# Patient Record
Sex: MALE | Race: WHITE | Employment: FULL TIME | URBAN - METROPOLITAN AREA
[De-identification: names, ages, dates, MRNs, and addresses within clinical notes are randomized per-mention and may not be internally consistent; named-entity substitution may affect disease eponyms.]

---

## 2017-02-16 RX ORDER — LOSARTAN POTASSIUM AND HYDROCHLOROTHIAZIDE 12.5; 1 MG/1; MG/1
TABLET ORAL
Qty: 30 TABLET | Refills: 0 | Status: SHIPPED | OUTPATIENT
Start: 2017-02-16 | End: 2017-03-14

## 2017-02-17 ENCOUNTER — OFFICE VISIT (OUTPATIENT)
Dept: INTERNAL MEDICINE CLINIC | Facility: CLINIC | Age: 72
End: 2017-02-17

## 2017-02-17 VITALS
BODY MASS INDEX: 29.89 KG/M2 | RESPIRATION RATE: 16 BRPM | HEIGHT: 66 IN | OXYGEN SATURATION: 98 % | SYSTOLIC BLOOD PRESSURE: 120 MMHG | DIASTOLIC BLOOD PRESSURE: 70 MMHG | HEART RATE: 77 BPM | WEIGHT: 186 LBS

## 2017-02-17 DIAGNOSIS — E78.00 PURE HYPERCHOLESTEROLEMIA: Chronic | ICD-10-CM

## 2017-02-17 DIAGNOSIS — I10 ESSENTIAL HYPERTENSION, BENIGN: Primary | Chronic | ICD-10-CM

## 2017-02-17 DIAGNOSIS — R97.20 ELEVATED PSA: ICD-10-CM

## 2017-02-17 DIAGNOSIS — R73.9 HYPERGLYCEMIA: ICD-10-CM

## 2017-02-17 PROCEDURE — 99213 OFFICE O/P EST LOW 20 MIN: CPT | Performed by: INTERNAL MEDICINE

## 2017-02-17 NOTE — PROGRESS NOTES
Jayashree García DOB 1945 is a 80year old male.     Patient presents with:  Medication Follow-Up       HPI:   BP check    Current Outpatient Prescriptions:  LOSARTAN POTASSIUM-HCTZ 100-12.5 MG Oral Tab TAKE ONE TABLET BY MOUTH ONCE DAILY Disp: 30 tabl Pharynx: normal.   Sinuses: non-tender. HEART:   Clicks: no.   Distal Pulses Palpable: yes. Edema: trace   Gallop: no .   Heart sounds: normal S1S2. Murmurs: none. Rhythm: regular. LUNGS:   Airflow: normal air movement.    Auscultation: no wheez

## 2017-02-21 LAB
ALBUMIN/GLOBULIN RATIO: 1.6 (CALC) (ref 1–2.5)
ALBUMIN: 4.6 G/DL (ref 3.6–5.1)
ALKALINE PHOSPHATASE: 88 U/L (ref 40–115)
ALT: 30 U/L (ref 9–46)
AST: 19 U/L (ref 10–35)
BILIRUBIN, TOTAL: 0.8 MG/DL (ref 0.2–1.2)
BUN: 21 MG/DL (ref 7–25)
CALCIUM: 9.7 MG/DL (ref 8.6–10.3)
CARBON DIOXIDE: 31 MMOL/L (ref 20–31)
CHLORIDE: 104 MMOL/L (ref 98–110)
CHOL/HDLC RATIO: 3.6 (CALC)
CHOLESTEROL, TOTAL: 209 MG/DL (ref 125–200)
CREATININE: 1.09 MG/DL (ref 0.7–1.18)
EGFR IF AFRICN AM: 79 ML/MIN/1.73M2
EGFR IF NONAFRICN AM: 68 ML/MIN/1.73M2
GLOBULIN: 2.8 G/DL (CALC) (ref 1.9–3.7)
GLUCOSE: 90 MG/DL (ref 65–99)
HDL CHOLESTEROL: 58 MG/DL
HEMOGLOBIN A1C: 5.6 % OF TOTAL HGB
LDL-CHOLESTEROL: 137 MG/DL (CALC)
NON-HDL CHOLESTEROL: 151 MG/DL (CALC)
POTASSIUM: 4.7 MMOL/L (ref 3.5–5.3)
PROTEIN, TOTAL: 7.4 G/DL (ref 6.1–8.1)
PSA, TOTAL: 3.7 NG/ML
SODIUM: 143 MMOL/L (ref 135–146)
TRIGLYCERIDES: 72 MG/DL

## 2017-02-27 DIAGNOSIS — E78.00 PURE HYPERCHOLESTEROLEMIA: Primary | Chronic | ICD-10-CM

## 2017-02-27 RX ORDER — PRAVASTATIN SODIUM 20 MG
20 TABLET ORAL NIGHTLY
Qty: 30 TABLET | Refills: 1 | Status: SHIPPED | OUTPATIENT
Start: 2017-02-27 | End: 2017-05-05

## 2017-03-14 RX ORDER — AMLODIPINE BESYLATE 5 MG/1
TABLET ORAL
Qty: 30 TABLET | Refills: 2 | Status: SHIPPED | OUTPATIENT
Start: 2017-03-14 | End: 2017-06-02

## 2017-03-14 RX ORDER — LOSARTAN POTASSIUM AND HYDROCHLOROTHIAZIDE 12.5; 1 MG/1; MG/1
TABLET ORAL
Qty: 30 TABLET | Refills: 2 | Status: SHIPPED | OUTPATIENT
Start: 2017-03-14 | End: 2017-06-02

## 2017-03-14 RX ORDER — ASPIRIN 81 MG/1
TABLET ORAL
Qty: 90 TABLET | Refills: 0 | Status: ON HOLD | OUTPATIENT
Start: 2017-03-14 | End: 2018-04-26

## 2017-03-14 NOTE — TELEPHONE ENCOUNTER
ASA is not part of protocol list. Please approve if appropriate. Last OV 2/17/17. Last refill 5/13/16 #90 with 3 refills.

## 2017-05-05 RX ORDER — PRAVASTATIN SODIUM 20 MG
TABLET ORAL
Qty: 30 TABLET | Refills: 0 | Status: SHIPPED | OUTPATIENT
Start: 2017-05-05 | End: 2017-06-02

## 2017-06-02 RX ORDER — PRAVASTATIN SODIUM 20 MG
TABLET ORAL
Qty: 30 TABLET | Refills: 0 | Status: SHIPPED | OUTPATIENT
Start: 2017-06-02 | End: 2017-07-06

## 2017-06-02 RX ORDER — AMLODIPINE BESYLATE 5 MG/1
TABLET ORAL
Qty: 30 TABLET | Refills: 0 | Status: SHIPPED | OUTPATIENT
Start: 2017-06-02 | End: 2017-07-25

## 2017-06-02 RX ORDER — LOSARTAN POTASSIUM AND HYDROCHLOROTHIAZIDE 12.5; 1 MG/1; MG/1
TABLET ORAL
Qty: 30 TABLET | Refills: 0 | Status: SHIPPED | OUTPATIENT
Start: 2017-06-02 | End: 2017-07-20

## 2017-07-06 RX ORDER — PRAVASTATIN SODIUM 20 MG
TABLET ORAL
Qty: 30 TABLET | Refills: 0 | Status: SHIPPED | OUTPATIENT
Start: 2017-07-06 | End: 2017-08-08

## 2017-07-20 RX ORDER — LOSARTAN POTASSIUM AND HYDROCHLOROTHIAZIDE 12.5; 1 MG/1; MG/1
TABLET ORAL
Qty: 30 TABLET | Refills: 0 | Status: SHIPPED | OUTPATIENT
Start: 2017-07-20 | End: 2017-08-08

## 2017-07-25 RX ORDER — AMLODIPINE BESYLATE 5 MG/1
TABLET ORAL
Qty: 90 TABLET | Refills: 0 | Status: SHIPPED | OUTPATIENT
Start: 2017-07-25 | End: 2017-08-08

## 2017-08-08 RX ORDER — LOSARTAN POTASSIUM AND HYDROCHLOROTHIAZIDE 12.5; 1 MG/1; MG/1
TABLET ORAL
Qty: 90 TABLET | Refills: 0 | Status: SHIPPED | OUTPATIENT
Start: 2017-08-08 | End: 2017-11-07

## 2017-08-08 RX ORDER — PRAVASTATIN SODIUM 20 MG
TABLET ORAL
Qty: 90 TABLET | Refills: 0 | Status: SHIPPED | OUTPATIENT
Start: 2017-08-08 | End: 2017-11-07

## 2017-08-08 RX ORDER — AMLODIPINE BESYLATE 5 MG/1
TABLET ORAL
Qty: 90 TABLET | Refills: 0 | Status: SHIPPED | OUTPATIENT
Start: 2017-08-08 | End: 2017-11-07

## 2017-10-02 ENCOUNTER — OFFICE VISIT (OUTPATIENT)
Dept: INTERNAL MEDICINE CLINIC | Facility: CLINIC | Age: 72
End: 2017-10-02

## 2017-10-02 VITALS
HEIGHT: 66 IN | RESPIRATION RATE: 16 BRPM | HEART RATE: 74 BPM | BODY MASS INDEX: 29.81 KG/M2 | WEIGHT: 185.5 LBS | DIASTOLIC BLOOD PRESSURE: 70 MMHG | SYSTOLIC BLOOD PRESSURE: 120 MMHG | OXYGEN SATURATION: 98 % | TEMPERATURE: 99 F

## 2017-10-02 DIAGNOSIS — Z00.00 ROUTINE GENERAL MEDICAL EXAMINATION AT A HEALTH CARE FACILITY: Primary | ICD-10-CM

## 2017-10-02 DIAGNOSIS — I10 ESSENTIAL HYPERTENSION, BENIGN: Chronic | ICD-10-CM

## 2017-10-02 DIAGNOSIS — L30.9 DERMATITIS: ICD-10-CM

## 2017-10-02 DIAGNOSIS — E78.00 PURE HYPERCHOLESTEROLEMIA: Chronic | ICD-10-CM

## 2017-10-02 PROCEDURE — 90686 IIV4 VACC NO PRSV 0.5 ML IM: CPT | Performed by: INTERNAL MEDICINE

## 2017-10-02 PROCEDURE — G0009 ADMIN PNEUMOCOCCAL VACCINE: HCPCS | Performed by: INTERNAL MEDICINE

## 2017-10-02 PROCEDURE — 90670 PCV13 VACCINE IM: CPT | Performed by: INTERNAL MEDICINE

## 2017-10-02 PROCEDURE — G0439 PPPS, SUBSEQ VISIT: HCPCS | Performed by: INTERNAL MEDICINE

## 2017-10-02 PROCEDURE — G0008 ADMIN INFLUENZA VIRUS VAC: HCPCS | Performed by: INTERNAL MEDICINE

## 2017-10-02 NOTE — PROGRESS NOTES
Carina Harry  1945 is a 71year old male. Patient presents with:  Physical: Est Pt.  Medicare wellness visit      HPI:   See below      Current Outpatient Prescriptions:  LOSARTAN POTASSIUM-HCTZ 100-12.5 MG Oral Tab TAKE ONE TABLET BY MOUTH ONC no sinus trouble. Mouth and Pharynx no sore throats, no hoarseness. Neck no lumps, no goiter, no neck stiffness or pain. Endocrine:   Diabetes none. Thyroid disorder none.    Respiratory:   Patient denies chest pain, cough, PERDOMO (dyspnea on exertion),wheez Conjunctiva normal.  Head: normocephalic. Nasal septum: midline. Nose: normal pink mucosa, no congestion, no swelling, no bleeding. Oral cavity: normal, no lesions seen. Turbinates: normal.   NECK:   Carotid bruit: none.    Cervical lymph nodes: unr physical.    Diagnoses and all orders for this visit:    Routine general medical examination at a health care facility  -     ASSAY, THYROID STIM HORMONE  -     T4(THYROXINE TOTAL)  -     HEMOGLOBIN A1C  -     COMP METABOLIC PANEL (14)  -     CBC WITH DIFF

## 2017-10-05 ENCOUNTER — TELEPHONE (OUTPATIENT)
Dept: INTERNAL MEDICINE CLINIC | Facility: CLINIC | Age: 72
End: 2017-10-05

## 2017-10-05 DIAGNOSIS — R73.03 PREDIABETES: Chronic | ICD-10-CM

## 2017-10-05 DIAGNOSIS — Z12.5 ENCOUNTER FOR SCREENING FOR MALIGNANT NEOPLASM OF PROSTATE: ICD-10-CM

## 2017-10-05 DIAGNOSIS — Z00.00 BLOOD TESTS FOR ROUTINE GENERAL PHYSICAL EXAMINATION: Primary | ICD-10-CM

## 2017-10-05 DIAGNOSIS — E78.00 PURE HYPERCHOLESTEROLEMIA: Chronic | ICD-10-CM

## 2017-10-05 NOTE — TELEPHONE ENCOUNTER
Pt would like lab orders sent [facility] please change in chart; he will go fri 20-6-17 in am do get done

## 2017-10-06 ENCOUNTER — LAB ENCOUNTER (OUTPATIENT)
Dept: LAB | Age: 72
End: 2017-10-06
Attending: INTERNAL MEDICINE
Payer: MEDICARE

## 2017-10-06 DIAGNOSIS — Z12.5 ENCOUNTER FOR SCREENING FOR MALIGNANT NEOPLASM OF PROSTATE: ICD-10-CM

## 2017-10-06 DIAGNOSIS — E78.00 PURE HYPERCHOLESTEROLEMIA: Chronic | ICD-10-CM

## 2017-10-06 DIAGNOSIS — Z00.00 BLOOD TESTS FOR ROUTINE GENERAL PHYSICAL EXAMINATION: ICD-10-CM

## 2017-10-06 DIAGNOSIS — R73.03 PREDIABETES: ICD-10-CM

## 2017-10-06 PROCEDURE — 85025 COMPLETE CBC W/AUTO DIFF WBC: CPT

## 2017-10-06 PROCEDURE — 80053 COMPREHEN METABOLIC PANEL: CPT

## 2017-10-06 PROCEDURE — 80061 LIPID PANEL: CPT

## 2017-10-06 PROCEDURE — 83036 HEMOGLOBIN GLYCOSYLATED A1C: CPT

## 2017-10-06 PROCEDURE — 36415 COLL VENOUS BLD VENIPUNCTURE: CPT

## 2017-10-06 PROCEDURE — 84443 ASSAY THYROID STIM HORMONE: CPT

## 2017-10-06 PROCEDURE — 84436 ASSAY OF TOTAL THYROXINE: CPT

## 2017-10-06 PROCEDURE — 81003 URINALYSIS AUTO W/O SCOPE: CPT

## 2017-10-13 ENCOUNTER — OFFICE VISIT (OUTPATIENT)
Dept: INTERNAL MEDICINE CLINIC | Facility: CLINIC | Age: 72
End: 2017-10-13

## 2017-10-13 VITALS
DIASTOLIC BLOOD PRESSURE: 70 MMHG | HEART RATE: 66 BPM | WEIGHT: 184 LBS | RESPIRATION RATE: 16 BRPM | OXYGEN SATURATION: 98 % | BODY MASS INDEX: 30 KG/M2 | TEMPERATURE: 98 F | SYSTOLIC BLOOD PRESSURE: 126 MMHG

## 2017-10-13 DIAGNOSIS — R97.20 ELEVATED PSA: ICD-10-CM

## 2017-10-13 DIAGNOSIS — I10 ESSENTIAL HYPERTENSION, BENIGN: Primary | Chronic | ICD-10-CM

## 2017-10-13 PROCEDURE — 99213 OFFICE O/P EST LOW 20 MIN: CPT | Performed by: INTERNAL MEDICINE

## 2017-10-13 NOTE — PROGRESS NOTES
Corrinne [facility] 1945 is a 71year old male. Patient presents with:   Follow - Up: labs       HPI:   BP check and lab results    Current Outpatient Prescriptions:  LOSARTAN POTASSIUM-HCTZ 100-12.5 MG Oral Tab TAKE ONE TABLET BY MOUTH ONCE DAILY SpO2 98%   BMI 29.70 kg/m²   HEENT:   jvp not raised. Ear canals: normal.   Ear drums: normal .   Ears: unremarkable. Mouth: unremarkable. Nasal septum: midline. Pharynx: normal.   Sinuses: non-tender.    HEART:   Clicks: no.   Distal Pulses Palpabl

## 2017-11-09 RX ORDER — AMLODIPINE BESYLATE 5 MG/1
TABLET ORAL
Qty: 90 TABLET | Refills: 0 | Status: SHIPPED | OUTPATIENT
Start: 2017-11-09 | End: 2018-05-07

## 2017-11-09 RX ORDER — LOSARTAN POTASSIUM AND HYDROCHLOROTHIAZIDE 12.5; 1 MG/1; MG/1
TABLET ORAL
Qty: 90 TABLET | Refills: 0 | Status: SHIPPED | OUTPATIENT
Start: 2017-11-09 | End: 2018-02-14

## 2017-11-09 RX ORDER — PRAVASTATIN SODIUM 20 MG
TABLET ORAL
Qty: 90 TABLET | Refills: 0 | Status: SHIPPED | OUTPATIENT
Start: 2017-11-09 | End: 2018-02-14

## 2017-11-09 NOTE — TELEPHONE ENCOUNTER
Requesting Amlodipine, Pravastatin, Losartan  LOV: 10/13/17  RTC: 6 months  Last Relevant Labs: 10-6-017  Filled:  LOSARTAN POTASSIUM-HCTZ 100-12.5 MG Oral Tab 90 tablet 0 8/8/2017     AMLODIPINE BESYLATE 5 MG Oral Tab 90 tablet 0 8/8/2017     PRAVASTATIN

## 2018-02-14 RX ORDER — LOSARTAN POTASSIUM AND HYDROCHLOROTHIAZIDE 12.5; 1 MG/1; MG/1
TABLET ORAL
Qty: 90 TABLET | Refills: 1 | Status: SHIPPED | OUTPATIENT
Start: 2018-02-14 | End: 2018-08-16

## 2018-02-14 RX ORDER — PRAVASTATIN SODIUM 20 MG
TABLET ORAL
Qty: 90 TABLET | Refills: 1 | Status: SHIPPED | OUTPATIENT
Start: 2018-02-14 | End: 2018-10-24

## 2018-02-14 NOTE — TELEPHONE ENCOUNTER
Pharmacy calling in requesting a refill for:     LOSARTAN POTASSIUM-HCTZ 100-12.5 MG Oral Tab  PRAVASTATIN SODIUM 20 MG Oral Tab    To be sent to:  [address] ( on file)

## 2018-04-23 ENCOUNTER — APPOINTMENT (OUTPATIENT)
Dept: CT IMAGING | Facility: HOSPITAL | Age: 73
End: 2018-04-23
Attending: EMERGENCY MEDICINE
Payer: MEDICARE

## 2018-04-23 ENCOUNTER — APPOINTMENT (OUTPATIENT)
Dept: GENERAL RADIOLOGY | Facility: HOSPITAL | Age: 73
End: 2018-04-23
Attending: EMERGENCY MEDICINE
Payer: MEDICARE

## 2018-04-23 ENCOUNTER — OFFICE VISIT (OUTPATIENT)
Dept: INTERNAL MEDICINE CLINIC | Facility: CLINIC | Age: 73
End: 2018-04-23

## 2018-04-23 ENCOUNTER — HOSPITAL ENCOUNTER (OUTPATIENT)
Facility: HOSPITAL | Age: 73
Setting detail: OBSERVATION
LOS: 1 days | Discharge: HOME | End: 2018-04-26
Attending: EMERGENCY MEDICINE | Admitting: HOSPITALIST
Payer: MEDICARE

## 2018-04-23 VITALS
RESPIRATION RATE: 16 BRPM | TEMPERATURE: 100 F | SYSTOLIC BLOOD PRESSURE: 144 MMHG | BODY MASS INDEX: 29.32 KG/M2 | WEIGHT: 176 LBS | DIASTOLIC BLOOD PRESSURE: 68 MMHG | HEART RATE: 90 BPM | HEIGHT: 65 IN | OXYGEN SATURATION: 98 %

## 2018-04-23 DIAGNOSIS — R50.9 FEBRILE ILLNESS: ICD-10-CM

## 2018-04-23 DIAGNOSIS — D64.9 ANEMIA, UNSPECIFIED TYPE: ICD-10-CM

## 2018-04-23 DIAGNOSIS — K92.1 GASTROINTESTINAL HEMORRHAGE WITH MELENA: Primary | ICD-10-CM

## 2018-04-23 DIAGNOSIS — K92.2 UPPER GI BLEEDING: Primary | ICD-10-CM

## 2018-04-23 DIAGNOSIS — K92.1 MELENA: ICD-10-CM

## 2018-04-23 PROCEDURE — 71045 X-RAY EXAM CHEST 1 VIEW: CPT | Performed by: EMERGENCY MEDICINE

## 2018-04-23 PROCEDURE — 99214 OFFICE O/P EST MOD 30 MIN: CPT | Performed by: INTERNAL MEDICINE

## 2018-04-23 PROCEDURE — 74177 CT ABD & PELVIS W/CONTRAST: CPT | Performed by: EMERGENCY MEDICINE

## 2018-04-23 RX ORDER — ACETAMINOPHEN 500 MG
1000 TABLET ORAL ONCE
Status: COMPLETED | OUTPATIENT
Start: 2018-04-23 | End: 2018-04-23

## 2018-04-23 RX ORDER — SODIUM CHLORIDE 9 MG/ML
INJECTION, SOLUTION INTRAVENOUS CONTINUOUS
Status: DISCONTINUED | OUTPATIENT
Start: 2018-04-24 | End: 2018-04-26

## 2018-04-23 RX ORDER — ONDANSETRON 2 MG/ML
4 INJECTION INTRAMUSCULAR; INTRAVENOUS EVERY 6 HOURS PRN
Status: DISCONTINUED | OUTPATIENT
Start: 2018-04-23 | End: 2018-04-23

## 2018-04-23 RX ORDER — ONDANSETRON 2 MG/ML
4 INJECTION INTRAMUSCULAR; INTRAVENOUS EVERY 6 HOURS PRN
Status: DISCONTINUED | OUTPATIENT
Start: 2018-04-23 | End: 2018-04-26

## 2018-04-23 RX ORDER — ACETAMINOPHEN 325 MG/1
650 TABLET ORAL EVERY 6 HOURS PRN
Status: DISCONTINUED | OUTPATIENT
Start: 2018-04-23 | End: 2018-04-26

## 2018-04-23 RX ORDER — ONDANSETRON 2 MG/ML
4 INJECTION INTRAMUSCULAR; INTRAVENOUS
Status: DISCONTINUED | OUTPATIENT
Start: 2018-04-23 | End: 2018-04-26

## 2018-04-24 ENCOUNTER — ANESTHESIA EVENT (OUTPATIENT)
Dept: ENDOSCOPY | Facility: HOSPITAL | Age: 73
End: 2018-04-24
Payer: MEDICARE

## 2018-04-24 PROCEDURE — 99219 INITIAL OBSERVATION CARE,LEVL II: CPT | Performed by: INTERNAL MEDICINE

## 2018-04-24 RX ORDER — POTASSIUM CHLORIDE 20 MEQ/1
40 TABLET, EXTENDED RELEASE ORAL EVERY 4 HOURS
Status: COMPLETED | OUTPATIENT
Start: 2018-04-24 | End: 2018-04-24

## 2018-04-24 NOTE — PROGRESS NOTES
Pt seen and examined. Cont. IV PPI BID. Cont. IVF. Monitor Hgb. EGD per GI. Hold BP meds.     Alysha Marquez MD

## 2018-04-24 NOTE — PLAN OF CARE
Patient alert and oriented x4. Patient denies pain this morning. Patient denies nausea/vomiting. Patient states he has not had any stools since yesterday morning. If patient has another stool then sample will be sent for c-diff.  Patient to be evaluated by

## 2018-04-24 NOTE — PROGRESS NOTES
NURSING ADMISSION NOTE      Patient admitted via Cart from ER due to GI bleed,  Oriented to room. Safety precautions initiated. Bed in low position. Call light in reach. NPO except meds instructed, alert and oriented.   Denies pain, GI consulted in

## 2018-04-24 NOTE — H&P
[facility]  History and Physical     Jayashree Laughter Patient Status:  Inpatient    1945 MRN [id number]   [facility] 4NW-A Attending Augie Farooq MD   Hosp Day # 1 PCP Leny Garcia MD     Chief Complaint: melena    History [Penicillins]       Hives    Medications:    No current facility-administered medications on file prior to encounter.    Current Outpatient Prescriptions on File Prior to Encounter:  Losartan Potassium-HCTZ 100-12.5 MG Oral Tab TAKE ONE TABLET BY MOUTH ONCE GFRAA  81   GFRNAA  70   CA  9.3   ALB  3.9   NA  138   K  3.2*   CL  104   CO2  28.0   ALKPHO  85   AST  21   ALT  36   BILT  0.4   TP  7.3       No results for input(s): PTP, INR in the last 72 hours.     Recent Labs   Lab  04/23/18 2048   TROP  <0.04

## 2018-04-24 NOTE — ANESTHESIA PREPROCEDURE EVALUATION
PRE-OP EVALUATION    Patient Name: Fabien Alford    Pre-op Diagnosis: Melena [K92.1]  Anemia, unspecified type [D64.9]    Procedure(s):  ESOPHAGOGASTRODUODENOSCOPY (EGD)    Surgeon(s) and Role:     * Anthony Kaur, DO - Primary    Pre-op vitals revie Negative GI/hepatic/renal ROS.                              Cardiovascular    Negative cardiovascular ROS.              (+) hypertension   (+) hyperlipidemia                                  Endo/Other                           (+) arthritis       Pulmonary

## 2018-04-24 NOTE — CONSULTS
[facility]                       Gastroenterology Consultation-[facility]    Riley Moreno Patient Status:  Inpatient    1945 MRN [id number]   [facility] 4NW-A Attending Patrecia Cooks, MD   [address] Day # 1 PCP Veronica hyperplasia)    • DDD (degenerative disc disease), cervical    • DJD (degenerative joint disease), multiple sites     hips, lumbar, cervical   • Essential hypertension    • High blood pressure    • High cholesterol    • Hypertension    • Other and unspecif breath, asthma, copd, recurrent pneumonia            Hematologic: The patient reports no easy bruising, frequent gum bleeding or nose bleeding;   The patient has no history of known chronic anemia            Dermatologic: The patient reports no recent rashe ALB 3.9 04/23/2018   ALKPHO 85 04/23/2018   BILT 0.4 04/23/2018   AST 21 04/23/2018   ALT 36 04/23/2018     Recent Labs   Lab  04/23/18 2048   GLU  107*   BUN  23*   CREATSERUM  1.06   GFRAA  81   GFRNAA  70   CA  9.3   NA  138   K  3.2*   CL  104   CO  Moderate to severe colonic diverticulosis.  No acute inflammation.  Normal appendix.  No ascites. ABDOMINAL WALL:  Fact containing umbilical hernia measuring up to 4.2 cm.  Defect measured at 1.5 cm. URINARY BLADDER:  Normal distended shape.   PELVIC NOD episodes of bleeding or change in BM pattern. On a daily baby ASA and was using ibuprofen daily x 3-4 weeks for arthritic pain. No recent EGD and he has never completed a colonoscopy.  On admission, BUN elevated 23 with normal creatinine and Hgb now 9.5 fro

## 2018-04-24 NOTE — CM/SW NOTE
Patient failed Inpatient criteria. Second level of review completed and supports Observation. UR committee in agreement. Discussed with Dr Cheyenne Watson   approves.

## 2018-04-24 NOTE — ED PROVIDER NOTES
Patient Seen in: [facility] Emergency Department    History   Patient presents with:  GI Bleeding (gastrointestinal)  Abdomen/Flank Pain (GI/)    Stated Complaint: black stool    HPI     18-year-old male presents for evaluation of black diarrhea. Temp 101.5 °F (38.6 °C) (Temporal)   Resp 18   Ht 165.1 cm (5' 5\")   Wt 79.8 kg   SpO2 98%   BMI 29.29 kg/m²         Physical Exam    General: Alert, oriented, no apparent distress  HEENT: Atraumatic, normocephalic. Pupils equal reactive.   Extraocular mo ANTIBODY SCREEN[[id number]]                                  Final result                 Please view results for these tests on the individual orders.    ABORH (BLOOD TYPE)   ANTIBODY SCREEN   RAINBOW DRAW BLUE   RAINBOW DRAW GOLD   BLOOD CULTURE   B

## 2018-04-25 ENCOUNTER — ANESTHESIA (OUTPATIENT)
Dept: ENDOSCOPY | Facility: HOSPITAL | Age: 73
End: 2018-04-25
Payer: MEDICARE

## 2018-04-25 ENCOUNTER — SURGERY (OUTPATIENT)
Age: 73
End: 2018-04-25

## 2018-04-25 PROCEDURE — 0W3P8ZZ CONTROL BLEEDING IN GASTROINTESTINAL TRACT, VIA NATURAL OR ARTIFICIAL OPENING ENDOSCOPIC: ICD-10-PCS | Performed by: INTERNAL MEDICINE

## 2018-04-25 PROCEDURE — 99225 SUBSEQUENT OBSERVATION CARE: CPT | Performed by: HOSPITALIST

## 2018-04-25 NOTE — H&P (VIEW-ONLY)
[facility]                       Gastroenterology Consultation-[facility]    Simón Johnson Patient Status:  Inpatient    1945 MRN [id number]   [facility] 4NW-A Attending Gallito Rboles MD   [facility] Day # 1 MINDY Martin hyperplasia)    • DDD (degenerative disc disease), cervical    • DJD (degenerative joint disease), multiple sites     hips, lumbar, cervical   • Essential hypertension    • High blood pressure    • High cholesterol    • Hypertension    • Other and unspecif breath, asthma, copd, recurrent pneumonia            Hematologic: The patient reports no easy bruising, frequent gum bleeding or nose bleeding;   The patient has no history of known chronic anemia            Dermatologic: The patient reports no recent rashe ALB 3.9 04/23/2018   ALKPHO 85 04/23/2018   BILT 0.4 04/23/2018   AST 21 04/23/2018   ALT 36 04/23/2018     Recent Labs   Lab  04/23/18 2048   GLU  107*   BUN  23*   CREATSERUM  1.06   GFRAA  81   GFRNAA  70   CA  9.3   NA  138   K  3.2*   CL  104   CO  Moderate to severe colonic diverticulosis.  No acute inflammation.  Normal appendix.  No ascites. ABDOMINAL WALL:  Fact containing umbilical hernia measuring up to 4.2 cm.  Defect measured at 1.5 cm. URINARY BLADDER:  Normal distended shape.   PELVIC NOD episodes of bleeding or change in BM pattern. On a daily baby ASA and was using ibuprofen daily x 3-4 weeks for arthritic pain. No recent EGD and he has never completed a colonoscopy.  On admission, BUN elevated 23 with normal creatinine and Hgb now 9.5 fro

## 2018-04-25 NOTE — PROGRESS NOTES
[facility]  Progress Note     Jessica Jiménez Patient Status:  Observation    1945 MRN [id number]   [facility] 4NW-A Attending Willie Jewell, [address] [facility] Day # 1 PCP Kirsten Stewart MD     Chief Complaint: Melena     S: Patient s Imaging: Imaging data reviewed in Epic. Medications:   • pantoprazole (PROTONIX) IV push  40 mg Intravenous Q12H       ASSESSMENT / PLAN:     1. Melena secondary to duodenal ulcer  1. S/p EGD with epi injection and cautery  2.  BID PPI  3. GI followi

## 2018-04-25 NOTE — OPERATIVE REPORT
Shannon Enriquez Patient Status:  Observation    1945 MRN [id number]   [facility] ENDOSCOPY Attending Gunjan Porras, [address] Day # 1 PCP Silvestre Nascimento MD       PREOPERATIVE DIAGNOSIS/INDICATION: Anemia, Melena  POSTOPERTA IMPRESSION:   1. Duodenal bulb ulcer with pigmented spot s/p epinephrine, clip placement and electrocautery. 2. Clean based pyloric channel ulcer. 3. Gastric body erythema. 4. Ulcers likely NSAID related.    RECOMMENDATIONS:    1. IV PPI BID X

## 2018-04-25 NOTE — INTERVAL H&P NOTE
History & Physical Examination    Patient Name: Malathi Latham  MRN: [id number]  CSN: [id number]  YOB: 1945    Diagnosis: anemia/melena    Present Illness: 72 y/o M history of as above presents for EGD      Prescriptions Prior to Admission: left leg  Family History   Problem Relation Age of Onset   • Other [OTHER] Father      alz   • Other [OTHER] Mother      alz   • Cancer Brother         Smoking status: Former Smoker     Types: Cigarettes, Cigars    Smokeless tobacco: Never Used    Alcohol

## 2018-04-25 NOTE — ANESTHESIA POSTPROCEDURE EVALUATION
[facility] Patient Status:  Observation   Age/Gender 71year old male MRN [id number]   Location [facility]. Attending Donell Phelps, [address] [facility] Day # 1 PCP Myrna Ho MD       Anesthesia Post-op Note    Procedure(s)

## 2018-04-25 NOTE — PLAN OF CARE
Patient alert and oriented x4. Patient with EGD completed this morning. Duodenal ulcer found. During procedure, epinephrine shot given and clip placed and site cauterized. Patient to hold aspirin for 5 days and no other NSAIDS to be given.  Patient to [facility]

## 2018-04-25 NOTE — PLAN OF CARE
GASTROINTESTINAL - ADULT    • Maintains or returns to baseline bowel function Not Progressing          GASTROINTESTINAL - ADULT    • Minimal or absence of nausea and vomiting Progressing    • Maintains adequate nutritional intake (undernourished) Progressi

## 2018-04-26 VITALS
TEMPERATURE: 98 F | HEART RATE: 58 BPM | WEIGHT: 176 LBS | SYSTOLIC BLOOD PRESSURE: 118 MMHG | OXYGEN SATURATION: 97 % | RESPIRATION RATE: 18 BRPM | DIASTOLIC BLOOD PRESSURE: 56 MMHG | BODY MASS INDEX: 29.32 KG/M2 | HEIGHT: 65 IN

## 2018-04-26 PROCEDURE — 99217 OBSERVATION CARE DISCHARGE: CPT | Performed by: HOSPITALIST

## 2018-04-26 RX ORDER — ASPIRIN 81 MG/1
81 TABLET ORAL DAILY
Status: ON HOLD | COMMUNITY
End: 2018-04-26

## 2018-04-26 RX ORDER — PANTOPRAZOLE SODIUM 40 MG/1
40 TABLET, DELAYED RELEASE ORAL
Qty: 60 TABLET | Refills: 0 | Status: SHIPPED | OUTPATIENT
Start: 2018-04-26 | End: 2018-06-23

## 2018-04-26 RX ORDER — ASPIRIN 81 MG/1
81 TABLET ORAL DAILY
Refills: 0 | Status: SHIPPED | COMMUNITY
Start: 2018-04-29 | End: 2018-05-03

## 2018-04-26 NOTE — DISCHARGE SUMMARY
[facility]  DISCHARGE SUMMARY     Leonel Dover Patient Status:  Observation    1945 MRN [id number]   [facility] 4NW-A Attending Leo Verma, [address] [facility] Day # 1 PCP Beck Fu MD     Date of Admission: 2018  Date o Procedures during hospitalization:   • EGD    Incidental or significant findings and recommendations (brief descriptions):  • None    Lab/Test results pending at Discharge:   · None    Consultants:  • GI    Discharge Medication List:     Discharge Medi General: No acute distress. Respiratory: Clear to auscultation bilaterally. No wheezes. No rhonchi. Cardiovascular: S1, S2. Bradycardic. No murmurs, rubs or gallops. Abdomen: Soft, nontender, nondistended. Positive bowel sounds.  No rebound or guard

## 2018-04-26 NOTE — PROGRESS NOTES
[facility]    Gastroenterology Follow-Up Note      Festus Pardo Patient Status:  Observation    1945 MRN [id number]   [facility] 4NW-A Attending Ann Story, [address] [facility] Day # 1 PCP Liam Freitas MD     Chief Complaint/Reas

## 2018-04-26 NOTE — PROGRESS NOTES
Resting quietly without signs of distress, no c/o pain or active bleeding noted, continues on clear liquid diet, tolerating well, remains on IVF and protonix IVP bid as ordered, H&H Q 8 hours, ambulatory in room with steady gait, family remains at bedside,

## 2018-04-26 NOTE — PLAN OF CARE
Assumed care @ 0730. Patient denies abdominal discomfort. Clear liquids tolerated well. Patient had 1 small greenish formed stool. Patient's vital signs stable. 1245- Soft diet tolerated well.

## 2018-04-27 ENCOUNTER — PATIENT OUTREACH (OUTPATIENT)
Dept: CASE MANAGEMENT | Age: 73
End: 2018-04-27

## 2018-04-27 NOTE — PROGRESS NOTES
S/w wife Kayla Nassar who stated that the patient was not home and requested call back at a different time.

## 2018-04-30 NOTE — PROGRESS NOTES
Spoke to pt's wife Jos Galvez who states pt is not home right now. She states pt will call back when he gets home. [facility] name and work cell # left for pt to call back.

## 2018-05-01 ENCOUNTER — TELEPHONE (OUTPATIENT)
Dept: INTERNAL MEDICINE CLINIC | Facility: CLINIC | Age: 73
End: 2018-05-01

## 2018-05-01 NOTE — TELEPHONE ENCOUNTER
Spoke to pt's wife per HIPPA for hospital follow-up. Wife state pt is having L shoulder pain and cannot take NSAIDs per GI. Pt refusing to take OTC Tylenol for this- does not work per wife.   Wife would like to know if pt can take Diclofenac or if there i

## 2018-05-01 NOTE — PROGRESS NOTES
Initial Post Discharge Follow Up   Discharge Date: 4/26/18  Contact Date: 4/27/2018    Consent Verification:  Assessment Completed With: Spouse: wife, Justin Henning received per patient?  written  HIPAA Verified?   Yes    Discharge Dx:  Duodenal ulcer

## 2018-05-01 NOTE — TELEPHONE ENCOUNTER
Spoke to pt's wife per RADHAMES for hospital follow-up. Wife states pt has HFU appt scheduled for Friday 5/4 but does not think pt can make it as he will be working that day.   Pt is also having L shoulder pain, so wife requesting to reschedule appt to [facility]

## 2018-05-03 ENCOUNTER — LAB ENCOUNTER (OUTPATIENT)
Dept: LAB | Age: 73
End: 2018-05-03
Attending: INTERNAL MEDICINE
Payer: MEDICARE

## 2018-05-03 ENCOUNTER — OFFICE VISIT (OUTPATIENT)
Dept: INTERNAL MEDICINE CLINIC | Facility: CLINIC | Age: 73
End: 2018-05-03

## 2018-05-03 ENCOUNTER — HOSPITAL ENCOUNTER (OUTPATIENT)
Dept: GENERAL RADIOLOGY | Age: 73
Discharge: HOME | End: 2018-05-03
Attending: INTERNAL MEDICINE
Payer: MEDICARE

## 2018-05-03 VITALS
RESPIRATION RATE: 14 BRPM | TEMPERATURE: 99 F | WEIGHT: 176.75 LBS | HEIGHT: 65 IN | OXYGEN SATURATION: 97 % | SYSTOLIC BLOOD PRESSURE: 136 MMHG | DIASTOLIC BLOOD PRESSURE: 70 MMHG | HEART RATE: 71 BPM | BODY MASS INDEX: 29.45 KG/M2

## 2018-05-03 DIAGNOSIS — M19.011 PRIMARY OSTEOARTHRITIS OF RIGHT SHOULDER: ICD-10-CM

## 2018-05-03 DIAGNOSIS — K26.9 DUODENAL ULCER: Primary | ICD-10-CM

## 2018-05-03 DIAGNOSIS — K26.9 DUODENAL ULCER: ICD-10-CM

## 2018-05-03 PROBLEM — K92.2 UPPER GI BLEEDING: Status: RESOLVED | Noted: 2018-04-23 | Resolved: 2018-05-03

## 2018-05-03 PROBLEM — R50.9 FEBRILE ILLNESS: Status: RESOLVED | Noted: 2018-04-23 | Resolved: 2018-05-03

## 2018-05-03 PROCEDURE — 1111F DSCHRG MED/CURRENT MED MERGE: CPT | Performed by: INTERNAL MEDICINE

## 2018-05-03 PROCEDURE — 80048 BASIC METABOLIC PNL TOTAL CA: CPT

## 2018-05-03 PROCEDURE — 99495 TRANSJ CARE MGMT MOD F2F 14D: CPT | Performed by: INTERNAL MEDICINE

## 2018-05-03 PROCEDURE — 85025 COMPLETE CBC W/AUTO DIFF WBC: CPT

## 2018-05-03 PROCEDURE — 36415 COLL VENOUS BLD VENIPUNCTURE: CPT

## 2018-05-03 PROCEDURE — 73030 X-RAY EXAM OF SHOULDER: CPT | Performed by: INTERNAL MEDICINE

## 2018-05-03 NOTE — PROGRESS NOTES
Julien Quiroz  1945 is a 71year old male. Patient presents with:  Hospital F/U      HPI:   Abdominal symptoms resolved. Currently has no black colored stools.   Has appointment with GI   Next week  Complains of long-standing discomfort in th no. Weight loss no. Genitourinary:   Loss of control of urine no. Recurrent Urinary Tract Infection (UTI) no . Blood in urine no. Burning on urination no. Difficulty urinating no. Dysuria none. Flank pain no. Frequent Nighttime Urination none .  Pain with nonsteroidals in view of his recent duodenal ulcer  Patient Instructions   See me in 4 weeks     The patient indicates understanding of these issues and agrees to the plan. The patient is asked to Return in about 4 weeks (around 5/31/2018).   Ronald Leo

## 2018-05-07 NOTE — TELEPHONE ENCOUNTER
Medication(s) to Refill:   Pending Prescriptions Disp Refills    AmLODIPine Besylate 5 MG Oral Tab 90 tablet 0     Sig: TAKE ONE TABLET BY MOUTH ONCE DAILY         Patient hasn't taken medication since November 2017    Reason for Medication Refill being se

## 2018-05-07 NOTE — TELEPHONE ENCOUNTER
[address] [facility] called requesting refill for:  AMLODIPINE BESYLATE 5 MG Oral Tab    [facility] [place] [address], [place] [facility] [address], [phone]

## 2018-05-08 RX ORDER — AMLODIPINE BESYLATE 5 MG/1
TABLET ORAL
Qty: 90 TABLET | Refills: 0 | Status: SHIPPED | OUTPATIENT
Start: 2018-05-08 | End: 2018-08-13

## 2018-08-07 ENCOUNTER — LAB ENCOUNTER (OUTPATIENT)
Dept: LAB | Age: 73
End: 2018-08-07
Attending: INTERNAL MEDICINE
Payer: MEDICARE

## 2018-08-07 DIAGNOSIS — R79.9 ABNORMAL BLOOD CHEMISTRY LEVEL: ICD-10-CM

## 2018-08-07 LAB
BASOPHILS # BLD AUTO: 0.02 X10(3) UL (ref 0–0.1)
BASOPHILS NFR BLD AUTO: 0.3 %
EOSINOPHIL # BLD AUTO: 0.22 X10(3) UL (ref 0–0.3)
EOSINOPHIL NFR BLD AUTO: 3.4 %
ERYTHROCYTE [DISTWIDTH] IN BLOOD BY AUTOMATED COUNT: 14.4 % (ref 11.5–16)
HCT VFR BLD AUTO: 42.2 % (ref 37–53)
HGB BLD-MCNC: 13.9 G/DL (ref 13–17)
IMMATURE GRANULOCYTE COUNT: 0.01 X10(3) UL (ref 0–1)
IMMATURE GRANULOCYTE RATIO %: 0.2 %
LYMPHOCYTES # BLD AUTO: 1.91 X10(3) UL (ref 0.9–4)
LYMPHOCYTES NFR BLD AUTO: 29.1 %
MCH RBC QN AUTO: 28.1 PG (ref 27–33.2)
MCHC RBC AUTO-ENTMCNC: 32.9 G/DL (ref 31–37)
MCV RBC AUTO: 85.3 FL (ref 80–99)
MONOCYTES # BLD AUTO: 0.51 X10(3) UL (ref 0.1–1)
MONOCYTES NFR BLD AUTO: 7.8 %
NEUTROPHIL ABS PRELIM: 3.89 X10 (3) UL (ref 1.3–6.7)
NEUTROPHILS # BLD AUTO: 3.89 X10(3) UL (ref 1.3–6.7)
NEUTROPHILS NFR BLD AUTO: 59.2 %
PLATELET # BLD AUTO: 191 10(3)UL (ref 150–450)
RBC # BLD AUTO: 4.95 X10(6)UL (ref 3.8–5.8)
RED CELL DISTRIBUTION WIDTH-SD: 44.3 FL (ref 35.1–46.3)
WBC # BLD AUTO: 6.6 X10(3) UL (ref 4–13)

## 2018-08-07 PROCEDURE — 85025 COMPLETE CBC W/AUTO DIFF WBC: CPT

## 2018-08-07 PROCEDURE — 36415 COLL VENOUS BLD VENIPUNCTURE: CPT

## 2018-08-13 RX ORDER — AMLODIPINE BESYLATE 5 MG/1
TABLET ORAL
Qty: 90 TABLET | Refills: 0 | Status: SHIPPED | OUTPATIENT
Start: 2018-08-13 | End: 2018-11-20

## 2018-08-13 NOTE — TELEPHONE ENCOUNTER
Medication(s) to Refill:   Pending Prescriptions Disp Refills    AMLODIPINE BESYLATE 5 MG Oral Tab [Pharmacy Med Name:  AmLODIPine Besylate 5MG TAB] 90 tablet 0     Sig: TAKE 1 TABLET BY MOUTH ONCE DAILY           Last Time Medication was Filled:  05/08/201

## 2018-08-14 ENCOUNTER — OFFICE VISIT (OUTPATIENT)
Dept: INTERNAL MEDICINE CLINIC | Facility: CLINIC | Age: 73
End: 2018-08-14
Payer: MEDICARE

## 2018-08-14 VITALS
OXYGEN SATURATION: 97 % | WEIGHT: 178.5 LBS | SYSTOLIC BLOOD PRESSURE: 110 MMHG | TEMPERATURE: 99 F | DIASTOLIC BLOOD PRESSURE: 72 MMHG | RESPIRATION RATE: 12 BRPM | BODY MASS INDEX: 30 KG/M2 | HEART RATE: 66 BPM

## 2018-08-14 DIAGNOSIS — R97.20 ELEVATED PSA: ICD-10-CM

## 2018-08-14 DIAGNOSIS — M19.011 PRIMARY OSTEOARTHRITIS OF RIGHT SHOULDER: Primary | ICD-10-CM

## 2018-08-14 PROCEDURE — 99213 OFFICE O/P EST LOW 20 MIN: CPT | Performed by: INTERNAL MEDICINE

## 2018-08-14 NOTE — PROGRESS NOTES
Leonel Dover [facility] 1945 is a 68year old male. Patient presents with:   Follow - Up      HPI:     Complains of long-standing discomfort in the right shoulder especially on abduction and trying to reach behind his back  Patient never went for thera swelling or redness, no deformities. RANGE OF MOTION: internal rotation, external rotation, abduction, FROM but painful. PALPATION: pain with palpation over greater tuberousity and subacromial bursa. IMPINGEMENT SIGN: positive.    MUSCLE TESTING: weak

## 2018-08-16 RX ORDER — LOSARTAN POTASSIUM AND HYDROCHLOROTHIAZIDE 12.5; 1 MG/1; MG/1
TABLET ORAL
Qty: 90 TABLET | Refills: 1 | Status: SHIPPED | OUTPATIENT
Start: 2018-08-16 | End: 2019-01-24

## 2018-08-16 NOTE — TELEPHONE ENCOUNTER
Medication(s) to Refill:   Pending Prescriptions Disp Refills    LOSARTAN POTASSIUM-HCTZ 100-12.5 MG Oral Tab [Pharmacy Med Name: LOSARTAN/-12.5MG TAB] 90 tablet 1     Sig: TAKE 1 TABLET BY MOUTH ONCE DAILY           Last Time Medication was Filled:

## 2018-10-25 RX ORDER — PRAVASTATIN SODIUM 20 MG
TABLET ORAL
Qty: 90 TABLET | Refills: 1 | Status: SHIPPED | OUTPATIENT
Start: 2018-10-25 | End: 2019-02-14

## 2018-10-25 NOTE — TELEPHONE ENCOUNTER
Refill requested:   Requested Prescriptions     Pending Prescriptions Disp Refills   • Pravastatin Sodium 20 MG Oral Tab [Pharmacy Med Name: PRAVASTATIN 20MG    TAB] 90 tablet 1     Sig: TAKE ONE TABLET BY MOUTH NIGHTLY       Failed protocol      Last refi

## 2018-11-21 RX ORDER — AMLODIPINE BESYLATE 5 MG/1
TABLET ORAL
Qty: 90 TABLET | Refills: 0 | Status: SHIPPED | OUTPATIENT
Start: 2018-11-21 | End: 2019-01-24

## 2018-12-20 ENCOUNTER — OFFICE VISIT (OUTPATIENT)
Dept: INTERNAL MEDICINE CLINIC | Facility: CLINIC | Age: 73
End: 2018-12-20
Payer: MEDICARE

## 2018-12-20 VITALS
OXYGEN SATURATION: 98 % | SYSTOLIC BLOOD PRESSURE: 136 MMHG | RESPIRATION RATE: 20 BRPM | HEART RATE: 84 BPM | DIASTOLIC BLOOD PRESSURE: 76 MMHG | BODY MASS INDEX: 28.66 KG/M2 | WEIGHT: 172 LBS | TEMPERATURE: 98 F | HEIGHT: 65 IN

## 2018-12-20 DIAGNOSIS — M17.0 PRIMARY OSTEOARTHRITIS OF BOTH KNEES: Primary | ICD-10-CM

## 2018-12-20 DIAGNOSIS — Z00.00 LABORATORY EXAMINATION ORDERED AS PART OF A ROUTINE GENERAL MEDICAL EXAMINATION: ICD-10-CM

## 2018-12-20 PROCEDURE — 99213 OFFICE O/P EST LOW 20 MIN: CPT | Performed by: INTERNAL MEDICINE
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
## 2018-12-27 ENCOUNTER — TELEPHONE (OUTPATIENT)
Dept: INTERNAL MEDICINE CLINIC | Facility: CLINIC | Age: 73
End: 2018-12-27

## 2018-12-27 NOTE — TELEPHONE ENCOUNTER
Referral for tracking purposes only. Changed provider to Dr. Willian Medrano. Pt wife notified and verbalized understanding.

## 2018-12-27 NOTE — TELEPHONE ENCOUNTER
Patient calling in seeking a change in the referral/order that was given to him to see Dr Julisa Temple. Pt would like to see Orthopedic, Dr Malka Hernandez, instead, since his family has been there before. Please call pt with updated order status.

## 2019-01-25 RX ORDER — AMLODIPINE BESYLATE 5 MG/1
TABLET ORAL
Qty: 90 TABLET | Refills: 0 | Status: SHIPPED | OUTPATIENT
Start: 2019-01-25 | End: 2019-06-20

## 2019-01-25 RX ORDER — LOSARTAN POTASSIUM AND HYDROCHLOROTHIAZIDE 12.5; 1 MG/1; MG/1
TABLET ORAL
Qty: 90 TABLET | Refills: 0 | Status: SHIPPED | OUTPATIENT
Start: 2019-01-25 | End: 2019-06-20

## 2019-01-25 NOTE — TELEPHONE ENCOUNTER
Refill requested:   Requested Prescriptions     Pending Prescriptions Disp Refills   • AMLODIPINE BESYLATE 5 MG Oral Tab [Pharmacy Med Name: AMLODIPINE 5MG TAB] 90 tablet 0     Sig: TAKE 1 TABLET BY MOUTH ONCE DAILY   • LOSARTAN POTASSIUM-HCTZ 100-12.5 MG [address], [facility]  [address] [facility] [address]  [place] [address]  [address] [place] [address]  [place] [id number]

## 2019-01-31 ENCOUNTER — OFFICE VISIT (OUTPATIENT)
Dept: INTERNAL MEDICINE CLINIC | Facility: CLINIC | Age: 74
End: 2019-01-31
Payer: MEDICARE

## 2019-01-31 ENCOUNTER — LAB ENCOUNTER (OUTPATIENT)
Dept: LAB | Age: 74
End: 2019-01-31
Attending: INTERNAL MEDICINE
Payer: MEDICARE

## 2019-01-31 VITALS
HEART RATE: 82 BPM | SYSTOLIC BLOOD PRESSURE: 136 MMHG | RESPIRATION RATE: 16 BRPM | WEIGHT: 174.5 LBS | TEMPERATURE: 99 F | BODY MASS INDEX: 29.79 KG/M2 | DIASTOLIC BLOOD PRESSURE: 72 MMHG | HEIGHT: 64 IN | OXYGEN SATURATION: 94 %

## 2019-01-31 DIAGNOSIS — K26.9 DUODENAL ULCER: ICD-10-CM

## 2019-01-31 DIAGNOSIS — Z00.00 LABORATORY EXAMINATION ORDERED AS PART OF A ROUTINE GENERAL MEDICAL EXAMINATION: ICD-10-CM

## 2019-01-31 DIAGNOSIS — R97.20 ELEVATED PSA MEASUREMENT: ICD-10-CM

## 2019-01-31 DIAGNOSIS — Z01.818 PRE-OP EXAMINATION: Primary | ICD-10-CM

## 2019-01-31 LAB
ALBUMIN SERPL-MCNC: 3.5 G/DL (ref 3.1–4.5)
ALBUMIN/GLOB SERPL: 0.9 {RATIO} (ref 1–2)
ALP LIVER SERPL-CCNC: 102 U/L (ref 45–117)
ALT SERPL-CCNC: 36 U/L (ref 17–63)
ANION GAP SERPL CALC-SCNC: 7 MMOL/L (ref 0–18)
AST SERPL-CCNC: 20 U/L (ref 15–41)
BASOPHILS # BLD AUTO: 0.02 X10(3) UL (ref 0–0.2)
BASOPHILS NFR BLD AUTO: 0.2 %
BILIRUB SERPL-MCNC: 0.4 MG/DL (ref 0.1–2)
BILIRUB UR QL STRIP.AUTO: NEGATIVE
BUN BLD-MCNC: 18 MG/DL (ref 8–20)
BUN/CREAT SERPL: 20.2 (ref 10–20)
CALCIUM BLD-MCNC: 9.5 MG/DL (ref 8.3–10.3)
CHLORIDE SERPL-SCNC: 108 MMOL/L (ref 101–111)
CHOLEST SMN-MCNC: 159 MG/DL (ref ?–200)
CLARITY UR REFRACT.AUTO: CLEAR
CO2 SERPL-SCNC: 29 MMOL/L (ref 22–32)
COLOR UR AUTO: YELLOW
CREAT BLD-MCNC: 0.89 MG/DL (ref 0.7–1.3)
DEPRECATED RDW RBC AUTO: 44.6 FL (ref 35.1–46.3)
EOSINOPHIL # BLD AUTO: 0.17 X10(3) UL (ref 0–0.7)
EOSINOPHIL NFR BLD AUTO: 2 %
ERYTHROCYTE [DISTWIDTH] IN BLOOD BY AUTOMATED COUNT: 14 % (ref 11–15)
EST. AVERAGE GLUCOSE BLD GHB EST-MCNC: 126 MG/DL (ref 68–126)
GLOBULIN PLAS-MCNC: 3.8 G/DL (ref 2.8–4.4)
GLUCOSE BLD-MCNC: 97 MG/DL (ref 70–99)
GLUCOSE UR STRIP.AUTO-MCNC: NEGATIVE MG/DL
HBA1C MFR BLD HPLC: 6 % (ref ?–5.7)
HCT VFR BLD AUTO: 41.7 % (ref 39–53)
HDLC SERPL-MCNC: 51 MG/DL (ref 40–59)
HGB BLD-MCNC: 13.6 G/DL (ref 13–17.5)
IMM GRANULOCYTES # BLD AUTO: 0.03 X10(3) UL (ref 0–1)
IMM GRANULOCYTES NFR BLD: 0.4 %
KETONES UR STRIP.AUTO-MCNC: NEGATIVE MG/DL
LDLC SERPL CALC-MCNC: 95 MG/DL (ref ?–100)
LEUKOCYTE ESTERASE UR QL STRIP.AUTO: NEGATIVE
LYMPHOCYTES # BLD AUTO: 1.57 X10(3) UL (ref 1–4)
LYMPHOCYTES NFR BLD AUTO: 18.4 %
M PROTEIN MFR SERPL ELPH: 7.3 G/DL (ref 6.4–8.2)
MCH RBC QN AUTO: 28.3 PG (ref 26–34)
MCHC RBC AUTO-ENTMCNC: 32.6 G/DL (ref 31–37)
MCV RBC AUTO: 86.9 FL (ref 80–100)
MONOCYTES # BLD AUTO: 0.56 X10(3) UL (ref 0.1–1)
MONOCYTES NFR BLD AUTO: 6.6 %
NEUTROPHILS # BLD AUTO: 6.16 X10 (3) UL (ref 1.5–7.7)
NEUTROPHILS # BLD AUTO: 6.16 X10(3) UL (ref 1.5–7.7)
NEUTROPHILS NFR BLD AUTO: 72.4 %
NITRITE UR QL STRIP.AUTO: NEGATIVE
NONHDLC SERPL-MCNC: 108 MG/DL (ref ?–130)
OSMOLALITY SERPL CALC.SUM OF ELEC: 300 MOSM/KG (ref 275–295)
PH UR STRIP.AUTO: 6 [PH] (ref 4.5–8)
PLATELET # BLD AUTO: 255 10(3)UL (ref 150–450)
POTASSIUM SERPL-SCNC: 4.2 MMOL/L (ref 3.6–5.1)
PROT UR STRIP.AUTO-MCNC: NEGATIVE MG/DL
PSA SERPL-MCNC: 8.51 NG/ML (ref 0.01–4)
RBC # BLD AUTO: 4.8 X10(6)UL (ref 3.8–5.8)
RBC UR QL AUTO: NEGATIVE
SODIUM SERPL-SCNC: 144 MMOL/L (ref 136–144)
SP GR UR STRIP.AUTO: 1.01 (ref 1–1.03)
TRIGL SERPL-MCNC: 65 MG/DL (ref 30–149)
TSI SER-ACNC: 1.15 MIU/ML (ref 0.35–5.5)
UROBILINOGEN UR STRIP.AUTO-MCNC: <2 MG/DL
VLDLC SERPL CALC-MCNC: 13 MG/DL (ref 0–30)
WBC # BLD AUTO: 8.5 X10(3) UL (ref 4–11)

## 2019-01-31 PROCEDURE — 84443 ASSAY THYROID STIM HORMONE: CPT

## 2019-01-31 PROCEDURE — 85025 COMPLETE CBC W/AUTO DIFF WBC: CPT

## 2019-01-31 PROCEDURE — 84153 ASSAY OF PSA TOTAL: CPT

## 2019-01-31 PROCEDURE — 81003 URINALYSIS AUTO W/O SCOPE: CPT

## 2019-01-31 PROCEDURE — 83036 HEMOGLOBIN GLYCOSYLATED A1C: CPT

## 2019-01-31 PROCEDURE — 36415 COLL VENOUS BLD VENIPUNCTURE: CPT

## 2019-01-31 PROCEDURE — 99214 OFFICE O/P EST MOD 30 MIN: CPT | Performed by: INTERNAL MEDICINE

## 2019-01-31 PROCEDURE — 93000 ELECTROCARDIOGRAM COMPLETE: CPT | Performed by: INTERNAL MEDICINE

## 2019-01-31 PROCEDURE — 80053 COMPREHEN METABOLIC PANEL: CPT

## 2019-01-31 PROCEDURE — 80061 LIPID PANEL: CPT

## 2019-01-31 NOTE — PROGRESS NOTES
Simón Johnson  1945 is a 68year old male. Patient presents with:  Pre-Op Exam: Left Knee Arthroplasty - 2019 @ [facility] with Dr. Margie Oro.        HPI:   He  has had previous anesthesia:  yes  Previous complications:  no    Bhsakar or pain. Endocrine:   Diabetes prediabetic. Thyroid disorder none. Respiratory:   Patient denies chest pain, cough, PERDOMO (dyspnea on exertion), chest congestion, blood-tinged sputum/wheezing. Breathing normal pattern .    Cardiovascular:   Patient denies reflexes are normal.   Cerebellar Testing grossly/intact: yes. Gait: normal.   Motor: power-normal/tone -normal/co-ordination normal/wasting -none/involuntary movements -none. Sensory: normal sensation to all modalities.              ASSESSMENT AND PLAN

## 2019-02-04 ENCOUNTER — TELEPHONE (OUTPATIENT)
Dept: INTERNAL MEDICINE CLINIC | Facility: CLINIC | Age: 74
End: 2019-02-04

## 2019-02-04 NOTE — TELEPHONE ENCOUNTER
Pre-op clearance reports faxed to [facility] office. Information sent included patients medication list, EKG, labs, and office visit note. Information faxed to [phone]    Copy placed in folder.

## 2019-02-14 ENCOUNTER — OFFICE VISIT (OUTPATIENT)
Dept: INTERNAL MEDICINE CLINIC | Facility: CLINIC | Age: 74
End: 2019-02-14
Payer: MEDICARE

## 2019-02-14 VITALS
DIASTOLIC BLOOD PRESSURE: 70 MMHG | OXYGEN SATURATION: 99 % | RESPIRATION RATE: 16 BRPM | TEMPERATURE: 99 F | SYSTOLIC BLOOD PRESSURE: 138 MMHG | HEART RATE: 77 BPM | WEIGHT: 171.5 LBS | BODY MASS INDEX: 29.28 KG/M2 | HEIGHT: 64 IN

## 2019-02-14 DIAGNOSIS — I10 ESSENTIAL HYPERTENSION: ICD-10-CM

## 2019-02-14 DIAGNOSIS — E78.00 PURE HYPERCHOLESTEROLEMIA: ICD-10-CM

## 2019-02-14 DIAGNOSIS — Z00.00 ROUTINE GENERAL MEDICAL EXAMINATION AT A HEALTH CARE FACILITY: Primary | ICD-10-CM

## 2019-02-14 PROBLEM — M17.0 PRIMARY OSTEOARTHRITIS OF BOTH KNEES: Chronic | Status: ACTIVE | Noted: 2018-12-20

## 2019-02-14 PROCEDURE — G0439 PPPS, SUBSEQ VISIT: HCPCS | Performed by: INTERNAL MEDICINE

## 2019-02-14 NOTE — PROGRESS NOTES
Mook Puentes  1945 is a 68year old male. Patient presents with:  Physical      HPI:   See below      Current Outpatient Medications:  aspirin 81 MG Oral Tab Take 81 mg by mouth daily.  Disp:  Rfl:    AMLODIPINE BESYLATE 5 MG Oral Tab TAKE 1 TA . Chest congestion none. Cardiovascular:   Patient denies chest pain, rheumatic fever,heart murmur. hx of elevated cholesterol/hypertension. Leg edema none. Orthopnea no. Palpitations none. PND (paroxsymal nocturnal dyspnea) none.    Gastrointestinal:   P nodes: unremarkable. JVD: none. Range of Motion: normal.   Thyroid: unremarkable. HEART:   Clicks: no.   Edema: none visible . Heart sounds: normal.   Murmurs: none. Rhythm: regular. LUNGS:   Auscultation: clear .    Chest Shape: normal .   Perc hypercholesterolemia    Other orders  -     Cancel: FLU VACCINE ADJUVANT IM  -     Cancel: PNEUMOCOCCAL IMM (PNEUMOVAX)        Patient Instructions   Declined the immunizations.   Will see me after seen consults      The patient indicates understanding of t

## 2019-02-25 ENCOUNTER — HOSPITAL (OUTPATIENT)
Dept: OTHER | Age: 74
End: 2019-02-25

## 2019-02-25 ENCOUNTER — TELEPHONE (OUTPATIENT)
Dept: INTERNAL MEDICINE CLINIC | Facility: CLINIC | Age: 74
End: 2019-02-25

## 2019-02-25 NOTE — TELEPHONE ENCOUNTER
Marielos scott [facility] called requesting pre op notes, EKG and labs to be faxed for clearance    Sent to # [phone]

## 2019-02-26 ENCOUNTER — HOSPITAL (OUTPATIENT)
Dept: OTHER | Age: 74
End: 2019-02-26

## 2019-02-26 ENCOUNTER — HOSPITAL (OUTPATIENT)
Dept: OTHER | Age: 74
End: 2019-02-26
Attending: HOSPITALIST

## 2019-02-26 LAB
INR PPP: 1
INR PPP: 1
INR PPP: NORMAL
PROTHROMBIN TIME (PRT2): NORMAL
PROTHROMBIN TIME: 10.2 SEC (ref 9.7–11.8)
PROTHROMBIN TIME: 10.2 SECONDS (ref 9.7–11.8)
PROTHROMBIN TIME: NORMAL

## 2019-02-27 LAB
ANALYZER ANC (IANC): ABNORMAL
ANION GAP SERPL CALC-SCNC: 15 MMOL/L (ref 10–20)
BUN SERPL-MCNC: 15 MG/DL (ref 6–20)
BUN/CREAT SERPL: 21 (ref 7–25)
CALCIUM SERPL-MCNC: 9.3 MG/DL (ref 8.4–10.2)
CHLORIDE: 104 MMOL/L (ref 98–107)
CO2 SERPL-SCNC: 26 MMOL/L (ref 21–32)
CREAT SERPL-MCNC: 0.72 MG/DL (ref 0.67–1.17)
ERYTHROCYTE [DISTWIDTH] IN BLOOD: 14.6 % (ref 11–15)
GLUCOSE SERPL-MCNC: 127 MG/DL (ref 65–99)
HEMATOCRIT: 35.2 % (ref 39–51)
HGB BLD-MCNC: 11.6 GM/DL (ref 13–17)
MCH RBC QN AUTO: 27.6 PG (ref 26–34)
MCHC RBC AUTO-ENTMCNC: 33 GM/DL (ref 32–36.5)
MCV RBC AUTO: 83.6 FL (ref 78–100)
NRBC (NRBCRE): 0 /100 WBC
PLATELET # BLD: 226 THOUSAND/MCL (ref 140–450)
POTASSIUM SERPL-SCNC: 4.1 MMOL/L (ref 3.4–5.1)
RBC # BLD: 4.21 MILLION/MCL (ref 4.5–5.9)
SODIUM SERPL-SCNC: 141 MMOL/L (ref 135–145)
WBC # BLD: 10.9 THOUSAND/MCL (ref 4.2–11)

## 2019-03-01 LAB — PATHOLOGIST NAME: NORMAL

## 2019-03-04 PROBLEM — Z96.652 S/P TOTAL KNEE REPLACEMENT, LEFT: Status: ACTIVE | Noted: 2019-03-04

## 2019-03-11 PROBLEM — Z96.652 STATUS POST TOTAL LEFT KNEE REPLACEMENT: Status: ACTIVE | Noted: 2019-03-04

## 2019-03-18 PROBLEM — M25.562 ACUTE PAIN OF LEFT KNEE: Status: ACTIVE | Noted: 2019-03-18

## 2019-06-20 RX ORDER — LOSARTAN POTASSIUM AND HYDROCHLOROTHIAZIDE 12.5; 1 MG/1; MG/1
TABLET ORAL
Qty: 90 TABLET | Refills: 0 | Status: SHIPPED | OUTPATIENT
Start: 2019-06-20 | End: 2019-09-30

## 2019-06-20 RX ORDER — AMLODIPINE BESYLATE 5 MG/1
TABLET ORAL
Qty: 90 TABLET | Refills: 0 | Status: SHIPPED | OUTPATIENT
Start: 2019-06-20 | End: 2019-09-30

## 2019-06-21 NOTE — TELEPHONE ENCOUNTER
Protocol passed.      Medication(s) to Refill:   Requested Prescriptions     Pending Prescriptions Disp Refills   • LOSARTAN POTASSIUM-HCTZ 100-12.5 MG Oral Tab [Pharmacy Med Name: LOSARTAN/-12.5MG TAB] 90 tablet 0     Sig: TAKE 1 TABLET BY MOUTH ONC

## 2019-10-03 RX ORDER — LOSARTAN POTASSIUM AND HYDROCHLOROTHIAZIDE 12.5; 1 MG/1; MG/1
TABLET ORAL
Qty: 90 TABLET | Refills: 0 | Status: SHIPPED | OUTPATIENT
Start: 2019-10-03 | End: 2020-01-23

## 2019-10-03 RX ORDER — AMLODIPINE BESYLATE 5 MG/1
TABLET ORAL
Qty: 90 TABLET | Refills: 0 | Status: SHIPPED | OUTPATIENT
Start: 2019-10-03 | End: 2020-01-23

## 2019-10-03 NOTE — TELEPHONE ENCOUNTER
Failed protocol - appointment needed - Unable to reach pt    Medication(s) to Refill:   Requested Prescriptions     Pending Prescriptions Disp Refills   • LOSARTAN POTASSIUM-HCTZ 100-12.5 MG Oral Tab [Pharmacy Med Name: LOSARTAN/-12.5MG TAB] 90 tabl

## 2020-01-23 RX ORDER — AMLODIPINE BESYLATE 5 MG/1
TABLET ORAL
Qty: 90 TABLET | Refills: 0 | OUTPATIENT
Start: 2020-01-23

## 2020-01-23 RX ORDER — LOSARTAN POTASSIUM AND HYDROCHLOROTHIAZIDE 12.5; 1 MG/1; MG/1
TABLET ORAL
Qty: 90 TABLET | Refills: 0 | OUTPATIENT
Start: 2020-01-23

## 2020-01-23 NOTE — TELEPHONE ENCOUNTER
Future Appointments   Date Time Provider Kyle Phipps   2/7/2020  9:30 AM Ivan Mota MD [facility] [place]     Pt scheduled - please approve #30 as pt only has 4 days left of medication.

## 2020-01-23 NOTE — TELEPHONE ENCOUNTER
Appointment scheduled - pt would appreciate if full 90 day supply can be sent as he lives 45 minutes from his pharmacy.      Requesting LOSARTAN POTASSIUM-HCTZ 100-12.5 MG Oral Tab  LOV: 2/14/19  RTC: 6 months  Last Relevant Labs: 1/31/19  Filled: 10/3/19 #

## 2020-01-24 RX ORDER — AMLODIPINE BESYLATE 5 MG/1
5 TABLET ORAL
Qty: 30 TABLET | Refills: 0 | Status: SHIPPED | OUTPATIENT
Start: 2020-01-24 | End: 2020-02-07

## 2020-01-24 RX ORDER — LOSARTAN POTASSIUM AND HYDROCHLOROTHIAZIDE 12.5; 1 MG/1; MG/1
1 TABLET ORAL
Qty: 30 TABLET | Refills: 0 | Status: SHIPPED | OUTPATIENT
Start: 2020-01-24 | End: 2020-02-07

## 2020-02-07 ENCOUNTER — LAB ENCOUNTER (OUTPATIENT)
Dept: LAB | Age: 75
End: 2020-02-07
Attending: INTERNAL MEDICINE
Payer: MEDICARE

## 2020-02-07 ENCOUNTER — OFFICE VISIT (OUTPATIENT)
Dept: INTERNAL MEDICINE CLINIC | Facility: CLINIC | Age: 75
End: 2020-02-07
Payer: MEDICARE

## 2020-02-07 VITALS
BODY MASS INDEX: 30.81 KG/M2 | WEIGHT: 180.5 LBS | HEIGHT: 64 IN | DIASTOLIC BLOOD PRESSURE: 70 MMHG | OXYGEN SATURATION: 98 % | RESPIRATION RATE: 16 BRPM | SYSTOLIC BLOOD PRESSURE: 130 MMHG | TEMPERATURE: 99 F | HEART RATE: 76 BPM

## 2020-02-07 DIAGNOSIS — Z00.00 LABORATORY EXAMINATION ORDERED AS PART OF A ROUTINE GENERAL MEDICAL EXAMINATION: ICD-10-CM

## 2020-02-07 DIAGNOSIS — R97.20 ELEVATED PSA: ICD-10-CM

## 2020-02-07 DIAGNOSIS — E78.00 PURE HYPERCHOLESTEROLEMIA: Chronic | ICD-10-CM

## 2020-02-07 DIAGNOSIS — I10 ESSENTIAL HYPERTENSION: Primary | Chronic | ICD-10-CM

## 2020-02-07 PROBLEM — Z96.652 STATUS POST TOTAL LEFT KNEE REPLACEMENT: Chronic | Status: ACTIVE | Noted: 2019-03-04

## 2020-02-07 PROBLEM — M25.562 ACUTE PAIN OF LEFT KNEE: Status: RESOLVED | Noted: 2019-03-18 | Resolved: 2020-02-07

## 2020-02-07 LAB
ALBUMIN SERPL-MCNC: 4.1 G/DL (ref 3.4–5)
ALBUMIN/GLOB SERPL: 1 {RATIO} (ref 1–2)
ALP LIVER SERPL-CCNC: 96 U/L (ref 45–117)
ALT SERPL-CCNC: 41 U/L (ref 16–61)
ANION GAP SERPL CALC-SCNC: 5 MMOL/L (ref 0–18)
AST SERPL-CCNC: 18 U/L (ref 15–37)
BASOPHILS # BLD AUTO: 0.02 X10(3) UL (ref 0–0.2)
BASOPHILS NFR BLD AUTO: 0.3 %
BILIRUB SERPL-MCNC: 0.7 MG/DL (ref 0.1–2)
BILIRUB UR QL STRIP.AUTO: NEGATIVE
BUN BLD-MCNC: 15 MG/DL (ref 7–18)
BUN/CREAT SERPL: 12.9 (ref 10–20)
CALCIUM BLD-MCNC: 9.7 MG/DL (ref 8.5–10.1)
CHLORIDE SERPL-SCNC: 106 MMOL/L (ref 98–112)
CHOLEST SMN-MCNC: 202 MG/DL (ref ?–200)
CLARITY UR REFRACT.AUTO: CLEAR
CO2 SERPL-SCNC: 30 MMOL/L (ref 21–32)
CREAT BLD-MCNC: 1.16 MG/DL (ref 0.7–1.3)
DEPRECATED RDW RBC AUTO: 44.4 FL (ref 35.1–46.3)
EOSINOPHIL # BLD AUTO: 0.16 X10(3) UL (ref 0–0.7)
EOSINOPHIL NFR BLD AUTO: 2.3 %
ERYTHROCYTE [DISTWIDTH] IN BLOOD BY AUTOMATED COUNT: 13.7 % (ref 11–15)
EST. AVERAGE GLUCOSE BLD GHB EST-MCNC: 111 MG/DL (ref 68–126)
GLOBULIN PLAS-MCNC: 4 G/DL (ref 2.8–4.4)
GLUCOSE BLD-MCNC: 103 MG/DL (ref 70–99)
GLUCOSE UR STRIP.AUTO-MCNC: NEGATIVE MG/DL
HBA1C MFR BLD HPLC: 5.5 % (ref ?–5.7)
HCT VFR BLD AUTO: 46.8 % (ref 39–53)
HDLC SERPL-MCNC: 57 MG/DL (ref 40–59)
HGB BLD-MCNC: 15.4 G/DL (ref 13–17.5)
IMM GRANULOCYTES # BLD AUTO: 0.02 X10(3) UL (ref 0–1)
IMM GRANULOCYTES NFR BLD: 0.3 %
KETONES UR STRIP.AUTO-MCNC: NEGATIVE MG/DL
LDLC SERPL CALC-MCNC: 131 MG/DL (ref ?–100)
LEUKOCYTE ESTERASE UR QL STRIP.AUTO: NEGATIVE
LYMPHOCYTES # BLD AUTO: 1.44 X10(3) UL (ref 1–4)
LYMPHOCYTES NFR BLD AUTO: 20.8 %
M PROTEIN MFR SERPL ELPH: 8.1 G/DL (ref 6.4–8.2)
MCH RBC QN AUTO: 29.4 PG (ref 26–34)
MCHC RBC AUTO-ENTMCNC: 32.9 G/DL (ref 31–37)
MCV RBC AUTO: 89.3 FL (ref 80–100)
MONOCYTES # BLD AUTO: 0.44 X10(3) UL (ref 0.1–1)
MONOCYTES NFR BLD AUTO: 6.3 %
NEUTROPHILS # BLD AUTO: 4.85 X10 (3) UL (ref 1.5–7.7)
NEUTROPHILS # BLD AUTO: 4.85 X10(3) UL (ref 1.5–7.7)
NEUTROPHILS NFR BLD AUTO: 70 %
NITRITE UR QL STRIP.AUTO: NEGATIVE
NONHDLC SERPL-MCNC: 145 MG/DL (ref ?–130)
OSMOLALITY SERPL CALC.SUM OF ELEC: 293 MOSM/KG (ref 275–295)
PATIENT FASTING Y/N/NP: YES
PATIENT FASTING Y/N/NP: YES
PH UR STRIP.AUTO: 7 [PH] (ref 4.5–8)
PLATELET # BLD AUTO: 207 10(3)UL (ref 150–450)
POTASSIUM SERPL-SCNC: 3.8 MMOL/L (ref 3.5–5.1)
PROT UR STRIP.AUTO-MCNC: NEGATIVE MG/DL
PSA SERPL-MCNC: 8.11 NG/ML (ref ?–4)
RBC # BLD AUTO: 5.24 X10(6)UL (ref 3.8–5.8)
RBC UR QL AUTO: NEGATIVE
SODIUM SERPL-SCNC: 141 MMOL/L (ref 136–145)
SP GR UR STRIP.AUTO: 1.01 (ref 1–1.03)
T4 SERPL-MCNC: 9.5 UG/DL (ref 4.5–12.1)
TRIGL SERPL-MCNC: 70 MG/DL (ref 30–149)
TSI SER-ACNC: 0.96 MIU/ML (ref 0.36–3.74)
UROBILINOGEN UR STRIP.AUTO-MCNC: <2 MG/DL
VLDLC SERPL CALC-MCNC: 14 MG/DL (ref 0–30)
WBC # BLD AUTO: 6.9 X10(3) UL (ref 4–11)

## 2020-02-07 PROCEDURE — 81003 URINALYSIS AUTO W/O SCOPE: CPT

## 2020-02-07 PROCEDURE — 84153 ASSAY OF PSA TOTAL: CPT

## 2020-02-07 PROCEDURE — 80053 COMPREHEN METABOLIC PANEL: CPT

## 2020-02-07 PROCEDURE — 36415 COLL VENOUS BLD VENIPUNCTURE: CPT

## 2020-02-07 PROCEDURE — 84436 ASSAY OF TOTAL THYROXINE: CPT

## 2020-02-07 PROCEDURE — 80061 LIPID PANEL: CPT

## 2020-02-07 PROCEDURE — 85025 COMPLETE CBC W/AUTO DIFF WBC: CPT

## 2020-02-07 PROCEDURE — 99213 OFFICE O/P EST LOW 20 MIN: CPT | Performed by: INTERNAL MEDICINE

## 2020-02-07 PROCEDURE — 84443 ASSAY THYROID STIM HORMONE: CPT

## 2020-02-07 PROCEDURE — 83036 HEMOGLOBIN GLYCOSYLATED A1C: CPT

## 2020-02-07 RX ORDER — AMLODIPINE BESYLATE 5 MG/1
5 TABLET ORAL
Qty: 90 TABLET | Refills: 1 | Status: SHIPPED | OUTPATIENT
Start: 2020-02-07 | End: 2020-02-07

## 2020-02-07 RX ORDER — METOPROLOL SUCCINATE 50 MG/1
50 TABLET, EXTENDED RELEASE ORAL DAILY
Qty: 30 TABLET | Refills: 2 | Status: SHIPPED | OUTPATIENT
Start: 2020-02-07 | End: 2020-05-07

## 2020-02-07 RX ORDER — LOSARTAN POTASSIUM AND HYDROCHLOROTHIAZIDE 12.5; 1 MG/1; MG/1
1 TABLET ORAL
Qty: 90 TABLET | Refills: 1 | Status: SHIPPED | OUTPATIENT
Start: 2020-02-07 | End: 2020-02-28

## 2020-02-07 NOTE — PROGRESS NOTES
Sue Lee  1945 is a 69year old male. Patient presents with:  Hypertension       HPI:   BP check.   Patient never saw the urologist and the gastroenterologist  Current Outpatient Medications   Medication Sig Dispense Refill   • Losartan P Pharynx: normal.   Sinuses: non-tender. HEART:   Clicks: no.   Distal Pulses Palpable: yes. Edema: Shin high  Gallop: no .   Heart sounds: normal S1S2. Murmurs: none. Rhythm: regular. LUNGS:   Airflow: normal air movement.    Auscultation: no wh

## 2020-02-27 DIAGNOSIS — I10 ESSENTIAL HYPERTENSION: Chronic | ICD-10-CM

## 2020-02-28 DIAGNOSIS — I10 ESSENTIAL HYPERTENSION: Chronic | ICD-10-CM

## 2020-02-28 RX ORDER — AMLODIPINE BESYLATE 5 MG/1
TABLET ORAL
Qty: 30 TABLET | Refills: 0 | OUTPATIENT
Start: 2020-02-28

## 2020-02-28 RX ORDER — LOSARTAN POTASSIUM AND HYDROCHLOROTHIAZIDE 12.5; 1 MG/1; MG/1
1 TABLET ORAL
Qty: 30 TABLET | Refills: 0 | Status: SHIPPED | OUTPATIENT
Start: 2020-02-28 | End: 2020-03-02

## 2020-03-02 RX ORDER — LOSARTAN POTASSIUM AND HYDROCHLOROTHIAZIDE 12.5; 1 MG/1; MG/1
1 TABLET ORAL
Qty: 90 TABLET | Refills: 0 | Status: SHIPPED | OUTPATIENT
Start: 2020-03-02 | End: 2020-06-16

## 2020-03-02 RX ORDER — AMLODIPINE BESYLATE 5 MG/1
TABLET ORAL
Qty: 90 TABLET | Refills: 0 | OUTPATIENT
Start: 2020-03-02

## 2020-03-02 NOTE — TELEPHONE ENCOUNTER
Passed protocol - Amlodipine discontinued by provider on 2/7/2020    Requesting LOSARTAN POTASSIUM-HCTZ 100-12.5 MG Oral Tab  LOV: 2/7/20  RTC: 3 weeks  Last Relevant Labs: 2/7/2020  Filled: 2/28/2020 #30 with 0 refills        Future Appointments   Date Ti

## 2020-03-12 ENCOUNTER — OFFICE VISIT (OUTPATIENT)
Dept: INTERNAL MEDICINE CLINIC | Facility: CLINIC | Age: 75
End: 2020-03-12
Payer: MEDICARE

## 2020-03-12 VITALS
BODY MASS INDEX: 32.16 KG/M2 | WEIGHT: 183.75 LBS | TEMPERATURE: 98 F | HEART RATE: 62 BPM | OXYGEN SATURATION: 97 % | RESPIRATION RATE: 14 BRPM | SYSTOLIC BLOOD PRESSURE: 138 MMHG | HEIGHT: 63.5 IN | DIASTOLIC BLOOD PRESSURE: 82 MMHG

## 2020-03-12 DIAGNOSIS — E78.00 PURE HYPERCHOLESTEROLEMIA: Chronic | ICD-10-CM

## 2020-03-12 DIAGNOSIS — Z00.00 ROUTINE GENERAL MEDICAL EXAMINATION AT A HEALTH CARE FACILITY: Primary | ICD-10-CM

## 2020-03-12 DIAGNOSIS — R97.20 ELEVATED PSA: ICD-10-CM

## 2020-03-12 DIAGNOSIS — K42.9 UMBILICAL HERNIA WITHOUT OBSTRUCTION AND WITHOUT GANGRENE: Chronic | ICD-10-CM

## 2020-03-12 DIAGNOSIS — I10 ESSENTIAL HYPERTENSION: Chronic | ICD-10-CM

## 2020-03-12 PROBLEM — Z96.652 STATUS POST TOTAL LEFT KNEE REPLACEMENT: Chronic | Status: RESOLVED | Noted: 2019-03-04 | Resolved: 2020-03-12

## 2020-03-12 PROCEDURE — 96160 PT-FOCUSED HLTH RISK ASSMT: CPT | Performed by: INTERNAL MEDICINE

## 2020-03-12 PROCEDURE — 93000 ELECTROCARDIOGRAM COMPLETE: CPT | Performed by: INTERNAL MEDICINE

## 2020-03-12 PROCEDURE — G0439 PPPS, SUBSEQ VISIT: HCPCS | Performed by: INTERNAL MEDICINE

## 2020-03-12 PROCEDURE — 99397 PER PM REEVAL EST PAT 65+ YR: CPT | Performed by: INTERNAL MEDICINE

## 2020-03-12 RX ORDER — PRAVASTATIN SODIUM 20 MG
20 TABLET ORAL NIGHTLY
Qty: 90 TABLET | Refills: 0 | Status: SHIPPED | OUTPATIENT
Start: 2020-03-12 | End: 2020-06-10

## 2020-03-12 NOTE — PROGRESS NOTES
REASON FOR VISIT:    Mahin Aquino is a 69year old male who presents for a Medicare Annual Wellness visit.    male     Patient Care Team: Patient Care Team:  Aisha Brown MD as PCP - General (Internal Medicine)  Aisha Brown MD as PCP - [facility]    P Ref Rng & Units 2/7/2020 1/31/2019 10/6/2017   TSH 0.358 - 3.740 mIU/mL 0.960 1.150 1.140     [facility] WELLNESS LAB REVIEW FLOWSHEET PSA Latest Ref Rng & Units 2/7/2020 1/31/2019   PSA <=4.00 ng/mL 8.11(H) 8.51(H)       General Health      In the past six month is it?: Correct  Recall \"Ball\": Correct  Recall \"Flag\": Correct  Recall \"Tree\": Correct         PREVENTATIVE SERVICES   INDICATIONS AND SCHEDULE Internal Lab or Procedure   Diabetes Screening     HbgA1C   Annually HEMOGLOBIN A1c (% of total Hgb)   Da Procedure Laterality Date   • ESOPHAGOGASTRODUODENOSCOPY (EGD) N/A 4/25/2018    Performed by Dick Ballard DO at [facility] ENDOSCOPY   • EYE SURGERY      left    • LEG/ANKLE SURGERY PROC UNLISTED      left leg   • TOTAL KNEE REPLACEMENT Left 02/26/2019    D appetite noted no change in bowel movement noted. Dysphagia none. Hematology:   Patient denies abnormal bleeding, easy bruising. Enlarged lymph nodes none.    Men Only:   Patient denies difficulty with erection, penile discharge, testicular pain   Genitou no .   Respiratory effort: normal .   Rhonchi: no.   Wheezes: no. ABDOMEN:   Bowel sounds: normal.   General: normal.   Hernia: reducible umbilical hernia 1inch-no treatment desired  Liver, Spleen: no hepatosplenomegaly (HSM). Tenderness: absent .    GE INTERNAL    Elevated PSA  -     UROLOGY - INTERNAL    Other orders  -     Pravastatin Sodium (PRAVACHOL) 20 MG Oral Tab; Take 1 tablet (20 mg total) by mouth nightly.     EKG shows the patient a heart rate of 58 NC interval 156 occasional PAC otherwise unre

## 2020-06-15 DIAGNOSIS — I10 ESSENTIAL HYPERTENSION: Chronic | ICD-10-CM

## 2020-06-16 RX ORDER — LOSARTAN POTASSIUM AND HYDROCHLOROTHIAZIDE 12.5; 1 MG/1; MG/1
1 TABLET ORAL
Qty: 90 TABLET | Refills: 0 | Status: SHIPPED | OUTPATIENT
Start: 2020-06-16 | End: 2020-09-25

## 2020-06-16 NOTE — TELEPHONE ENCOUNTER
Passed protocol    Requesting LOSARTAN POTASSIUM-HCTZ 100-12.5 MG Oral Tab  LOV: 3/13/20  RTC: 3 months  Last Relevant Labs: 2/7/20  Filled: 3/2/20 #90 with 0 refills    Future Appointments   Date Time Provider Kyle Phipps   7/11/2020 10:45 AM Chandler

## 2020-07-11 ENCOUNTER — OFFICE VISIT (OUTPATIENT)
Dept: INTERNAL MEDICINE CLINIC | Facility: CLINIC | Age: 75
End: 2020-07-11
Payer: MEDICARE

## 2020-07-11 VITALS
DIASTOLIC BLOOD PRESSURE: 78 MMHG | BODY MASS INDEX: 33.42 KG/M2 | HEART RATE: 80 BPM | RESPIRATION RATE: 18 BRPM | OXYGEN SATURATION: 97 % | HEIGHT: 63.5 IN | TEMPERATURE: 100 F | WEIGHT: 191 LBS | SYSTOLIC BLOOD PRESSURE: 134 MMHG

## 2020-07-11 DIAGNOSIS — I10 ESSENTIAL HYPERTENSION: Primary | Chronic | ICD-10-CM

## 2020-07-11 DIAGNOSIS — R97.20 ELEVATED PSA: ICD-10-CM

## 2020-07-11 DIAGNOSIS — K42.9 UMBILICAL HERNIA WITHOUT OBSTRUCTION AND WITHOUT GANGRENE: ICD-10-CM

## 2020-07-11 DIAGNOSIS — E78.00 PURE HYPERCHOLESTEROLEMIA: Chronic | ICD-10-CM

## 2020-07-11 PROCEDURE — 99213 OFFICE O/P EST LOW 20 MIN: CPT | Performed by: INTERNAL MEDICINE

## 2020-07-11 NOTE — PROGRESS NOTES
Sue Lee  1945 is a 77year old male. Patient presents with:   Follow - Up       HPI:   BP check yet to see the urologist wants referral for surgery to repair the umbilical hernia  Current Outpatient Medications   Medication Sig Dispense non-tender. HEART:   Clicks: no.   Distal Pulses Palpable: yes. Edema: none visible . Gallop: no .   Heart sounds: normal S1S2. Murmurs: none. Rhythm: regular. LUNGS:   Airflow: normal air movement. Auscultation: no wheezing/rhonchi/rales.

## 2020-09-02 ENCOUNTER — LAB ENCOUNTER (OUTPATIENT)
Dept: LAB | Age: 75
End: 2020-09-02
Attending: INTERNAL MEDICINE
Payer: MEDICARE

## 2020-09-02 DIAGNOSIS — I10 ESSENTIAL HYPERTENSION: Chronic | ICD-10-CM

## 2020-09-02 DIAGNOSIS — R97.20 ELEVATED PSA: ICD-10-CM

## 2020-09-02 DIAGNOSIS — E78.00 PURE HYPERCHOLESTEROLEMIA: Chronic | ICD-10-CM

## 2020-09-02 LAB
ALBUMIN SERPL-MCNC: 3.9 G/DL (ref 3.4–5)
ALBUMIN/GLOB SERPL: 1.1 {RATIO} (ref 1–2)
ALP LIVER SERPL-CCNC: 78 U/L (ref 45–117)
ALT SERPL-CCNC: 37 U/L (ref 16–61)
ANION GAP SERPL CALC-SCNC: 4 MMOL/L (ref 0–18)
AST SERPL-CCNC: 19 U/L (ref 15–37)
BILIRUB SERPL-MCNC: 0.7 MG/DL (ref 0.1–2)
BUN BLD-MCNC: 18 MG/DL (ref 7–18)
BUN/CREAT SERPL: 16.5 (ref 10–20)
CALCIUM BLD-MCNC: 9.7 MG/DL (ref 8.5–10.1)
CHLORIDE SERPL-SCNC: 104 MMOL/L (ref 98–112)
CHOLEST SMN-MCNC: 198 MG/DL (ref ?–200)
CO2 SERPL-SCNC: 31 MMOL/L (ref 21–32)
CREAT BLD-MCNC: 1.09 MG/DL (ref 0.7–1.3)
GLOBULIN PLAS-MCNC: 3.5 G/DL (ref 2.8–4.4)
GLUCOSE BLD-MCNC: 100 MG/DL (ref 70–99)
HDLC SERPL-MCNC: 56 MG/DL (ref 40–59)
LDLC SERPL CALC-MCNC: 131 MG/DL (ref ?–100)
M PROTEIN MFR SERPL ELPH: 7.4 G/DL (ref 6.4–8.2)
NONHDLC SERPL-MCNC: 142 MG/DL (ref ?–130)
OSMOLALITY SERPL CALC.SUM OF ELEC: 290 MOSM/KG (ref 275–295)
PATIENT FASTING Y/N/NP: YES
PATIENT FASTING Y/N/NP: YES
POTASSIUM SERPL-SCNC: 3.4 MMOL/L (ref 3.5–5.1)
PSA SERPL-MCNC: 6.84 NG/ML (ref ?–4)
SODIUM SERPL-SCNC: 139 MMOL/L (ref 136–145)
TRIGL SERPL-MCNC: 56 MG/DL (ref 30–149)
VLDLC SERPL CALC-MCNC: 11 MG/DL (ref 0–30)

## 2020-09-02 PROCEDURE — 84153 ASSAY OF PSA TOTAL: CPT

## 2020-09-02 PROCEDURE — 80061 LIPID PANEL: CPT

## 2020-09-02 PROCEDURE — 80053 COMPREHEN METABOLIC PANEL: CPT

## 2020-09-02 PROCEDURE — 36415 COLL VENOUS BLD VENIPUNCTURE: CPT

## 2020-09-03 DIAGNOSIS — I10 ESSENTIAL HYPERTENSION: Primary | Chronic | ICD-10-CM

## 2020-09-03 RX ORDER — POTASSIUM CHLORIDE 20 MEQ/1
20 TABLET, EXTENDED RELEASE ORAL DAILY
Qty: 30 TABLET | Refills: 2 | Status: SHIPPED | OUTPATIENT
Start: 2020-09-03 | End: 2020-12-02

## 2020-09-24 DIAGNOSIS — I10 ESSENTIAL HYPERTENSION: Chronic | ICD-10-CM

## 2020-09-25 RX ORDER — LOSARTAN POTASSIUM AND HYDROCHLOROTHIAZIDE 12.5; 1 MG/1; MG/1
1 TABLET ORAL
Qty: 90 TABLET | Refills: 0 | Status: SHIPPED | OUTPATIENT
Start: 2020-09-25 | End: 2021-03-25

## 2020-09-25 NOTE — TELEPHONE ENCOUNTER
Passed protocol    Requesting LOSARTAN POTASSIUM-HCTZ 100-12.5 MG Oral Tab  LOV: 7/11/20  RTC: 6 months  Last Relevant Labs: 9/2/20  Filled: 6/16/20 #90 with 0 refills    Future Appointments   Date Time Provider Kyle Phipps   10/22/2020  3:30 PM Matt

## 2020-10-03 RX ORDER — AMLODIPINE BESYLATE 5 MG/1
5 TABLET ORAL DAILY
Qty: 90 TABLET | Refills: 0 | Status: SHIPPED | OUTPATIENT
Start: 2020-10-03 | End: 2021-03-25

## 2020-10-03 RX ORDER — AMLODIPINE BESYLATE 5 MG/1
TABLET ORAL
Qty: 90 TABLET | Refills: 0 | Status: SHIPPED | OUTPATIENT
Start: 2020-10-03 | End: 2020-10-03

## 2020-10-03 NOTE — TELEPHONE ENCOUNTER
Passed protocol    Requesting amLODIPine Besylate 5 MG Oral Tab  LOV: 7/11/20  RTC: 6 months  Last Relevant Labs: 9/2/20  Filled: 2/7/20 #90 with 1 refills    Future Appointments   Date Time Provider Kyle Phipps   10/22/2020  3:30 PM Singh Rivera

## 2020-10-22 ENCOUNTER — OFFICE VISIT (OUTPATIENT)
Dept: SURGERY | Facility: CLINIC | Age: 75
End: 2020-10-22
Payer: MEDICARE

## 2020-10-22 VITALS — HEART RATE: 76 BPM | DIASTOLIC BLOOD PRESSURE: 80 MMHG | SYSTOLIC BLOOD PRESSURE: 138 MMHG | TEMPERATURE: 98 F

## 2020-10-22 DIAGNOSIS — K42.0 INCARCERATED UMBILICAL HERNIA: Primary | ICD-10-CM

## 2020-10-22 DIAGNOSIS — I10 ESSENTIAL HYPERTENSION: Chronic | ICD-10-CM

## 2020-10-22 DIAGNOSIS — Z01.818 PREOP TESTING: ICD-10-CM

## 2020-10-22 DIAGNOSIS — E78.00 PURE HYPERCHOLESTEROLEMIA: Chronic | ICD-10-CM

## 2020-10-22 PROCEDURE — 99203 OFFICE O/P NEW LOW 30 MIN: CPT | Performed by: COLON & RECTAL SURGERY

## 2020-10-22 PROCEDURE — 3075F SYST BP GE 130 - 139MM HG: CPT | Performed by: COLON & RECTAL SURGERY

## 2020-10-22 PROCEDURE — 3079F DIAST BP 80-89 MM HG: CPT | Performed by: COLON & RECTAL SURGERY

## 2020-10-22 NOTE — H&P
New Patient Visit Note       Active Problems      1. Incarcerated umbilical hernia    2. Preop testing    3. Essential hypertension    4.  Pure hypercholesterolemia        Chief Complaint   Patient presents with:  Hernia: Umb hernia ref [place] -- States sl Umbilical hernia    • Varicose vein of leg 7/25/2016     Past Surgical History:   Procedure Laterality Date   • ESOPHAGOGASTRODUODENOSCOPY (EGD) N/A 4/25/2018    Performed by Krystal Stewart DO at [facility] ENDOSCOPY   • EYE SURGERY      left    • LEG/ANKLE JEROME sore throat and trouble swallowing. Respiratory: Negative for apnea, cough, shortness of breath and wheezing. Cardiovascular: Negative for chest pain, palpitations and leg swelling.    Gastrointestinal: Negative for abdominal distention, abdominal philip had an incarceration event. He states that he can get nausea in the mornings, is not sure it is attributable to the hernia. He has never had surgery on the abdomen. He has never had issues in the groin.   He has no developmental abnormalities of the an

## 2020-10-22 NOTE — PATIENT INSTRUCTIONS
I am seeing this patient in consultation from Dr. Tova Infante regarding a very large umbilical hernia. He states he has slight pain at random times. He has no nausea or vomiting. He has no constipation or diarrhea.   He has never had an incarceration event

## 2020-10-31 ENCOUNTER — APPOINTMENT (OUTPATIENT)
Dept: LAB | Age: 75
End: 2020-10-31
Attending: COLON & RECTAL SURGERY
Payer: MEDICARE

## 2020-10-31 ENCOUNTER — EKG ENCOUNTER (OUTPATIENT)
Dept: LAB | Age: 75
End: 2020-10-31
Attending: COLON & RECTAL SURGERY
Payer: MEDICARE

## 2020-10-31 DIAGNOSIS — Z01.818 PREOP TESTING: ICD-10-CM

## 2020-10-31 DIAGNOSIS — I10 ESSENTIAL HYPERTENSION: Chronic | ICD-10-CM

## 2020-10-31 PROCEDURE — 93010 ELECTROCARDIOGRAM REPORT: CPT | Performed by: INTERNAL MEDICINE

## 2020-10-31 PROCEDURE — 80053 COMPREHEN METABOLIC PANEL: CPT

## 2020-10-31 PROCEDURE — 93005 ELECTROCARDIOGRAM TRACING: CPT

## 2020-10-31 PROCEDURE — 36415 COLL VENOUS BLD VENIPUNCTURE: CPT

## 2020-11-05 ENCOUNTER — TELEPHONE (OUTPATIENT)
Dept: SURGERY | Facility: CLINIC | Age: 75
End: 2020-11-05

## 2020-11-09 ENCOUNTER — APPOINTMENT (OUTPATIENT)
Dept: LAB | Age: 75
End: 2020-11-09
Attending: COLON & RECTAL SURGERY
Payer: MEDICARE

## 2020-11-09 DIAGNOSIS — Z01.818 PREOP TESTING: ICD-10-CM

## 2020-11-19 ENCOUNTER — OFFICE VISIT (OUTPATIENT)
Dept: SURGERY | Facility: CLINIC | Age: 75
End: 2020-11-19

## 2020-11-19 VITALS
TEMPERATURE: 98 F | WEIGHT: 181 LBS | BODY MASS INDEX: 31.67 KG/M2 | HEIGHT: 63.5 IN | HEART RATE: 98 BPM | SYSTOLIC BLOOD PRESSURE: 168 MMHG | DIASTOLIC BLOOD PRESSURE: 109 MMHG

## 2020-11-19 DIAGNOSIS — K42.0 INCARCERATED UMBILICAL HERNIA: Primary | ICD-10-CM

## 2020-11-19 PROCEDURE — 3080F DIAST BP >= 90 MM HG: CPT | Performed by: COLON & RECTAL SURGERY

## 2020-11-19 PROCEDURE — 3008F BODY MASS INDEX DOCD: CPT | Performed by: COLON & RECTAL SURGERY

## 2020-11-19 PROCEDURE — 3077F SYST BP >= 140 MM HG: CPT | Performed by: COLON & RECTAL SURGERY

## 2020-11-19 NOTE — PROGRESS NOTES
Post Operative Visit Note       Active Problems  1. Incarcerated umbilical hernia         Chief Complaint   Patient presents with:  Post-Op: post op 79/12 umbilical herniorrhaphy. PT states no pain but is sore in the abd area.  PT states was haing constipat been reviewed by me today.     Family History   Problem Relation Age of Onset   • Other (Other) Father         alz   • Other (Other) Mother         alz   • Cancer Brother      Social History    Socioeconomic History      Marital status:       Spouse frequency and urgency. Musculoskeletal: Negative for arthralgias and myalgias. Skin: Negative for color change and rash. Neurological: Negative for tremors, syncope and weakness. Hematological: Negative for adenopathy. Does not bruise/bleed easily.

## 2020-12-14 ENCOUNTER — TELEPHONE (OUTPATIENT)
Dept: INTERNAL MEDICINE CLINIC | Facility: CLINIC | Age: 75
End: 2020-12-14

## 2020-12-14 DIAGNOSIS — Z01.00 EYE EXAM, ROUTINE: Primary | ICD-10-CM

## 2020-12-14 NOTE — TELEPHONE ENCOUNTER
Referral pending approval if appropriate. Thank you!     .Reason for the order/referral:OPHTHALMOLOGY/CONSULT   PCP: TALA   Refer to Provider ([address] Hiren Rehman   Specialty:OPHTHALMOLOGY   Patient Insurance: Payor: [address] [place] [address]

## 2020-12-18 ENCOUNTER — TELEPHONE (OUTPATIENT)
Dept: SURGERY | Facility: CLINIC | Age: 75
End: 2020-12-18

## 2021-01-28 ENCOUNTER — APPOINTMENT (OUTPATIENT)
Dept: ULTRASOUND IMAGING | Age: 76
End: 2021-01-28
Attending: NURSE PRACTITIONER
Payer: MEDICARE

## 2021-01-28 ENCOUNTER — HOSPITAL ENCOUNTER (OUTPATIENT)
Age: 76
Discharge: HOME | End: 2021-01-28
Payer: MEDICARE

## 2021-01-28 ENCOUNTER — APPOINTMENT (OUTPATIENT)
Dept: GENERAL RADIOLOGY | Age: 76
End: 2021-01-28
Attending: NURSE PRACTITIONER
Payer: MEDICARE

## 2021-01-28 ENCOUNTER — TELEPHONE (OUTPATIENT)
Dept: INTERNAL MEDICINE CLINIC | Facility: CLINIC | Age: 76
End: 2021-01-28

## 2021-01-28 VITALS
RESPIRATION RATE: 16 BRPM | OXYGEN SATURATION: 100 % | TEMPERATURE: 98 F | SYSTOLIC BLOOD PRESSURE: 155 MMHG | DIASTOLIC BLOOD PRESSURE: 84 MMHG | HEART RATE: 84 BPM

## 2021-01-28 DIAGNOSIS — M54.31 SCIATICA OF RIGHT SIDE: Primary | ICD-10-CM

## 2021-01-28 PROCEDURE — 72110 X-RAY EXAM L-2 SPINE 4/>VWS: CPT | Performed by: NURSE PRACTITIONER

## 2021-01-28 PROCEDURE — 93971 EXTREMITY STUDY: CPT | Performed by: NURSE PRACTITIONER

## 2021-01-28 PROCEDURE — 73590 X-RAY EXAM OF LOWER LEG: CPT | Performed by: NURSE PRACTITIONER

## 2021-01-28 PROCEDURE — 99214 OFFICE O/P EST MOD 30 MIN: CPT | Performed by: NURSE PRACTITIONER

## 2021-01-28 RX ORDER — PREDNISONE 20 MG/1
40 TABLET ORAL DAILY
Qty: 10 TABLET | Refills: 0 | Status: SHIPPED | OUTPATIENT
Start: 2021-01-28 | End: 2021-02-06

## 2021-01-28 NOTE — TELEPHONE ENCOUNTER
Patient called requesting to speak with the nurse. Would like to clarify which medications he is supposed to be on.

## 2021-01-28 NOTE — TELEPHONE ENCOUNTER
Pt states that a while ago VM rx a water pill for edema. Per pt he took medication for a while and edema resolved. Pt c/o R calf edema, redness and pain.      I explained to pt that d/t edema only bein in one leg, along with other s/s he is to proceed t

## 2021-01-28 NOTE — ED PROVIDER NOTES
Patient Seen in: [facility] [place]      History   Patient presents with:  Leg Pain    Stated Complaint: LEG PAIN     HPI/Subjective:   HPI  32-year-old male presents to the immediate care complaining of right leg pain from his buttocks down to h HPI.  Constitutional and vital signs reviewed. All other systems reviewed and negative except as noted above.     Physical Exam     ED Triage Vitals   BP 01/28/21 1025 155/84   Pulse 01/28/21 1025 84   Resp 01/28/21 1025 16   Temp 01/28/21 1047 97.5 °F with pain traveling up right leg into right buttock area. No injury. History of large right varicose vein in lower right leg. No injury. FINDINGS: No fracture or dislocation. Marginal enthesophytes in the knee are noted.   Marginal osteophytes in the righ two-dimensional images of the vascular structures, Doppler spectral analysis, and color flow. Doppler imaging were performed.   The following veins were imaged:  Common, deep, and superficial femoral, popliteal, sapheno-femoral junction, posterior tibial v up          Medications Prescribed:  Discharge Medication List as of 1/28/2021 11:58 AM    START taking these medications    predniSONE 20 MG Oral Tab  Take 2 tablets (40 mg total) by mouth daily for 5 days. , Normal, Disp-10 tablet, R-0

## 2021-02-02 ENCOUNTER — TELEPHONE (OUTPATIENT)
Dept: INTERNAL MEDICINE CLINIC | Facility: CLINIC | Age: 76
End: 2021-02-02

## 2021-02-02 NOTE — TELEPHONE ENCOUNTER
Pt stated he got a call from our office asking pt to cb, pt stated it was not a detailed message, pt just ask to put a message in case someone does need to talk to him. Please call pt and advise if need it.

## 2021-02-06 ENCOUNTER — TELEPHONE (OUTPATIENT)
Dept: INTERNAL MEDICINE CLINIC | Facility: CLINIC | Age: 76
End: 2021-02-06

## 2021-02-06 RX ORDER — PREDNISONE 20 MG/1
40 TABLET ORAL DAILY
Qty: 10 TABLET | Refills: 0 | Status: SHIPPED | OUTPATIENT
Start: 2021-02-06 | End: 2021-02-11

## 2021-02-06 NOTE — TELEPHONE ENCOUNTER
Patient paged MD today due to sciatic pain and refill request.   Patient was seen UC on 1/28/20 due to right leg pain from his buttocks down pain radiates to his ankle. Pain range 9/10. Completed prednisone course. Unable to walk due to pain.  Was referred

## 2021-02-06 NOTE — TELEPHONE ENCOUNTER
Pt c/o pain in R buttock which radiates down entire leg. Had similar pain last week at IC and was rx prednisone which did help pain. Finished medication and pain has returned.   Denies LBP, LE numbness, tingling, weakness, loss of bowel/bladder control or

## 2021-03-05 ENCOUNTER — TELEPHONE (OUTPATIENT)
Dept: CASE MANAGEMENT | Age: 76
End: 2021-03-05

## 2021-03-05 DIAGNOSIS — Z23 NEED FOR VACCINATION: ICD-10-CM

## 2021-03-21 ENCOUNTER — MA CHART PREP (OUTPATIENT)
Dept: FAMILY MEDICINE CLINIC | Facility: CLINIC | Age: 76
End: 2021-03-21

## 2021-03-21 PROBLEM — I70.0 ATHEROSCLEROSIS OF AORTA: Status: ACTIVE | Noted: 2021-03-21

## 2021-03-21 PROBLEM — I70.0 ATHEROSCLEROSIS OF AORTA (HCC): Status: ACTIVE | Noted: 2021-03-21

## 2021-03-25 ENCOUNTER — OFFICE VISIT (OUTPATIENT)
Dept: INTERNAL MEDICINE CLINIC | Facility: CLINIC | Age: 76
End: 2021-03-25
Payer: MEDICARE

## 2021-03-25 VITALS
SYSTOLIC BLOOD PRESSURE: 160 MMHG | DIASTOLIC BLOOD PRESSURE: 80 MMHG | TEMPERATURE: 99 F | BODY MASS INDEX: 33.63 KG/M2 | HEIGHT: 63.5 IN | RESPIRATION RATE: 16 BRPM | HEART RATE: 77 BPM | OXYGEN SATURATION: 99 % | WEIGHT: 192.19 LBS

## 2021-03-25 DIAGNOSIS — I10 ESSENTIAL HYPERTENSION: ICD-10-CM

## 2021-03-25 DIAGNOSIS — R97.20 ELEVATED PSA: ICD-10-CM

## 2021-03-25 DIAGNOSIS — E78.00 PURE HYPERCHOLESTEROLEMIA: ICD-10-CM

## 2021-03-25 DIAGNOSIS — Z00.00 ROUTINE GENERAL MEDICAL EXAMINATION AT A HEALTH CARE FACILITY: Primary | ICD-10-CM

## 2021-03-25 PROBLEM — K42.0 INCARCERATED UMBILICAL HERNIA: Status: RESOLVED | Noted: 2020-10-22 | Resolved: 2021-03-25

## 2021-03-25 PROBLEM — I70.0 ATHEROSCLEROSIS OF AORTA: Status: RESOLVED | Noted: 2021-03-21 | Resolved: 2021-03-25

## 2021-03-25 PROBLEM — I70.0 ATHEROSCLEROSIS OF AORTA (HCC): Status: RESOLVED | Noted: 2021-03-21 | Resolved: 2021-03-25

## 2021-03-25 PROCEDURE — 3079F DIAST BP 80-89 MM HG: CPT | Performed by: INTERNAL MEDICINE

## 2021-03-25 PROCEDURE — 3008F BODY MASS INDEX DOCD: CPT | Performed by: INTERNAL MEDICINE

## 2021-03-25 PROCEDURE — G0439 PPPS, SUBSEQ VISIT: HCPCS | Performed by: INTERNAL MEDICINE

## 2021-03-25 PROCEDURE — 99397 PER PM REEVAL EST PAT 65+ YR: CPT | Performed by: INTERNAL MEDICINE

## 2021-03-25 PROCEDURE — 3077F SYST BP >= 140 MM HG: CPT | Performed by: INTERNAL MEDICINE

## 2021-03-25 PROCEDURE — 96160 PT-FOCUSED HLTH RISK ASSMT: CPT | Performed by: INTERNAL MEDICINE

## 2021-03-25 RX ORDER — AMLODIPINE BESYLATE 5 MG/1
5 TABLET ORAL DAILY
Qty: 90 TABLET | Refills: 0 | Status: SHIPPED | OUTPATIENT
Start: 2021-03-25 | End: 2021-08-10

## 2021-03-25 RX ORDER — PANTOPRAZOLE SODIUM 40 MG/1
40 TABLET, DELAYED RELEASE ORAL
Qty: 60 TABLET | Refills: 0 | Status: SHIPPED | OUTPATIENT
Start: 2021-03-25 | End: 2021-08-16

## 2021-03-25 RX ORDER — LOSARTAN POTASSIUM AND HYDROCHLOROTHIAZIDE 12.5; 1 MG/1; MG/1
1 TABLET ORAL
Qty: 90 TABLET | Refills: 0 | Status: SHIPPED | OUTPATIENT
Start: 2021-03-25 | End: 2021-12-01

## 2021-03-25 NOTE — PROGRESS NOTES
REASON FOR VISIT:    Mahin Aquino is a 77year old male who presents for a Medicare Annual Wellness visit.    male     Patient Care Team: Patient Care Team:  Aisha Brown MD as PCP - General (Internal Medicine)  Aisha Brown MD as PCP - [facility]    P REVIEW FLOWSHEET PSA Latest Ref Rng & Units 9/2/2020 2/7/2020 1/31/2019   PSA <=4.00 ng/mL 6.84(H) 8.11(H) 8.51(H)       General Health            Functional Ability           Functional Status            Fall/Risk Assessment                 Depression Scr (degenerative joint disease), multiple sites     hips, lumbar, cervical   • Essential hypertension    • High blood pressure    • High cholesterol    • Hypertension    • Other and unspecified hyperlipidemia    • Primary osteoarthritis of both knees 10/20/20 pain.   Endocrine:   Diabetes none. Thyroid disorder none. Respiratory:   Patient denies chest pain, cough, PERDOMO (dyspnea on exertion),wheezing. Breathing normal pattern . Chest congestion none.    Cardiovascular:   Patient denies chest pain, rheumatic fev within normal limits. Pupils BEERTL. Sclera and Conjunctiva normal.  Head: normocephalic. Nasal septum: midline. Nose: normal pink mucosa, no congestion, no swelling, no bleeding. Oral cavity: normal, no lesions seen.    Turbinates: normal.   NECK:   Ca Dystrophic nails l fingers and toes --postinflammatory pigmentation noted on the anterior abdomen        ASSESSMENT AND OTHER RELEVANT CHRONIC CONDITIONS:   Julien Quiroz is a 77year old male who presents for a Medicare Assessment.      PLAN SUMMARY:

## 2021-03-26 ENCOUNTER — HOSPITAL ENCOUNTER (OUTPATIENT)
Age: 76
Discharge: HOME | End: 2021-03-26
Attending: NURSE PRACTITIONER
Payer: MEDICARE

## 2021-03-26 ENCOUNTER — APPOINTMENT (OUTPATIENT)
Dept: CT IMAGING | Age: 76
End: 2021-03-26
Attending: NURSE PRACTITIONER
Payer: MEDICARE

## 2021-03-26 ENCOUNTER — TELEPHONE (OUTPATIENT)
Dept: INTERNAL MEDICINE CLINIC | Facility: CLINIC | Age: 76
End: 2021-03-26

## 2021-03-26 VITALS
HEIGHT: 63 IN | OXYGEN SATURATION: 98 % | BODY MASS INDEX: 34.02 KG/M2 | WEIGHT: 192 LBS | SYSTOLIC BLOOD PRESSURE: 147 MMHG | RESPIRATION RATE: 16 BRPM | HEART RATE: 67 BPM | TEMPERATURE: 97 F | DIASTOLIC BLOOD PRESSURE: 76 MMHG

## 2021-03-26 DIAGNOSIS — K57.92 ACUTE DIVERTICULITIS: Primary | ICD-10-CM

## 2021-03-26 LAB
#MXD IC: 0.7 X10ˆ3/UL (ref 0.1–1)
CREAT BLD-MCNC: 0.9 MG/DL
GLUCOSE BLD-MCNC: 93 MG/DL (ref 70–99)
HCT VFR BLD AUTO: 45.6 %
HGB BLD-MCNC: 14.9 G/DL
ISTAT BUN: 12 MG/DL (ref 7–18)
ISTAT CHLORIDE: 99 MMOL/L (ref 98–112)
ISTAT HEMATOCRIT: 46 %
ISTAT IONIZED CALCIUM FOR CHEM 8: 1.2 MMOL/L (ref 1.12–1.32)
ISTAT POTASSIUM: 3.4 MMOL/L (ref 3.6–5.1)
ISTAT SODIUM: 141 MMOL/L (ref 136–145)
ISTAT TCO2: 30 MMOL/L (ref 21–32)
LYMPHOCYTES # BLD AUTO: 1.8 X10ˆ3/UL (ref 1–4)
LYMPHOCYTES NFR BLD AUTO: 19.1 %
MCH RBC QN AUTO: 29 PG (ref 26–34)
MCHC RBC AUTO-ENTMCNC: 32.7 G/DL (ref 31–37)
MCV RBC AUTO: 88.7 FL (ref 80–100)
MIXED CELL %: 7.6 %
NEUTROPHILS # BLD AUTO: 7.1 X10ˆ3/UL (ref 1.5–7.7)
NEUTROPHILS NFR BLD AUTO: 73.3 %
PLATELET # BLD AUTO: 173 X10ˆ3/UL (ref 150–450)
POCT BILIRUBIN URINE: NEGATIVE
POCT BLOOD URINE: NEGATIVE
POCT GLUCOSE URINE: NEGATIVE MG/DL
POCT KETONE URINE: NEGATIVE MG/DL
POCT LEUKOCYTE ESTERASE URINE: NEGATIVE
POCT NITRITE URINE: NEGATIVE
POCT PH URINE: 5.5 (ref 5–8)
POCT PROTEIN URINE: NEGATIVE MG/DL
POCT SPECIFIC GRAVITY URINE: 1.02
POCT URINE CLARITY: CLEAR
POCT URINE COLOR: YELLOW
POCT UROBILINOGEN URINE: 0.2 MG/DL
RBC # BLD AUTO: 5.14 X10ˆ6/UL
WBC # BLD AUTO: 9.6 X10ˆ3/UL (ref 4–11)

## 2021-03-26 PROCEDURE — 99215 OFFICE O/P EST HI 40 MIN: CPT

## 2021-03-26 PROCEDURE — 85025 COMPLETE CBC W/AUTO DIFF WBC: CPT | Performed by: NURSE PRACTITIONER

## 2021-03-26 PROCEDURE — 80047 BASIC METABLC PNL IONIZED CA: CPT

## 2021-03-26 PROCEDURE — 36415 COLL VENOUS BLD VENIPUNCTURE: CPT

## 2021-03-26 PROCEDURE — 99214 OFFICE O/P EST MOD 30 MIN: CPT

## 2021-03-26 PROCEDURE — 81002 URINALYSIS NONAUTO W/O SCOPE: CPT | Performed by: NURSE PRACTITIONER

## 2021-03-26 PROCEDURE — 74177 CT ABD & PELVIS W/CONTRAST: CPT | Performed by: NURSE PRACTITIONER

## 2021-03-26 RX ORDER — METRONIDAZOLE 500 MG/1
500 TABLET ORAL 3 TIMES DAILY
Qty: 30 TABLET | Refills: 0 | Status: SHIPPED | OUTPATIENT
Start: 2021-03-26 | End: 2021-04-05

## 2021-03-26 RX ORDER — CIPROFLOXACIN 500 MG/1
500 TABLET, FILM COATED ORAL 2 TIMES DAILY
Qty: 20 TABLET | Refills: 0 | Status: SHIPPED | OUTPATIENT
Start: 2021-03-26 | End: 2021-04-05

## 2021-03-26 NOTE — ED INITIAL ASSESSMENT (HPI)
Pt states he has a PMH of diverticulitis, and he has been having left sided abdominal pain since yesterday, that has not iimproved

## 2021-03-26 NOTE — TELEPHONE ENCOUNTER
Pt called c/o LLQ pain which patient rated as a 6/10. Patient states that pain is there while walking but intensifies when bending over or trying to get up. Pt reported hx of diverticulitis.      Patient denies N/V/D, blood in the stool, bloating/rigid abdo

## 2021-03-26 NOTE — TELEPHONE ENCOUNTER
Pt stated he was here yesterday 03/25/21 to see Dr. Surya Velázquez however pt woke up today with a very bad pain on his L side of his abdomen, pt is requesting to come in today to see dr. Surya Velázquez, however there is no appointments available, pt needs to be triaged

## 2021-03-26 NOTE — ED NOTES
Able to cannulate vein, and get blood from catheter, for labs but after flush, noted it started to begin a hematoma, iv removed from rt side and started on left after second attempt by MA

## 2021-03-26 NOTE — ED PROVIDER NOTES
Patient Seen in: [facility] [place]      History   Patient presents with:  Abdominal Pain    Stated Complaint: left side abd pain started this morning when he got up    HPI/Subjective:   HPI  Patient is a75 for a male past medical history of hype REPLACEMENT Left 02/26/2019    Dr Sanford Nelson                Social History    Tobacco Use      Smoking status: Former Smoker        Types: Cigarettes, Cigars      Smokeless tobacco: Never Used    Vaping Use      Vaping Use: Never used    Alcohol use: No    Jerardo normal.                 ED Course     Labs Reviewed   POCT ISTAT CHEM8 CARTRIDGE - Abnormal; Notable for the following components:       Result Value    ISTAT Potassium 3.4 (*)     All other components within normal limits   POCT URINALYSIS DIPSTICK - Norm adenopathy. BOWEL/MESENTERY:  The stomach, duodenum sweep and small bowel are unremarkable.   There are diverticular changes of the descending and sigmoid colon with a small focus of acute diverticulitis involving the descending colon spanning for approxim the liver. Mild diffuse fatty infiltration of the liver. Results discussed with patient. Will discharge home with Cipro and Flagyl. Patient declines narcotic medication. Advised patient take acetaminophen and ibuprofen if needed for discomfort.   GI

## 2021-08-10 RX ORDER — AMLODIPINE BESYLATE 5 MG/1
TABLET ORAL
Qty: 90 TABLET | Refills: 0 | Status: SHIPPED | OUTPATIENT
Start: 2021-08-10 | End: 2021-11-29

## 2021-08-16 RX ORDER — PANTOPRAZOLE SODIUM 40 MG/1
TABLET, DELAYED RELEASE ORAL
Qty: 60 TABLET | Refills: 0 | Status: SHIPPED | OUTPATIENT
Start: 2021-08-16

## 2021-08-16 NOTE — TELEPHONE ENCOUNTER
No Protocol     Requesting: pantoprazole 40mg     LOV: 3/25/21   RTC: 3 weeks   Filled: 3/25/21 #60 0 refills     Upcoming OV: none scheduled

## 2021-11-12 ENCOUNTER — HOSPITAL ENCOUNTER (OUTPATIENT)
Age: 76
Discharge: HOME | End: 2021-11-12
Payer: MEDICARE

## 2021-11-12 VITALS
BODY MASS INDEX: 33.13 KG/M2 | HEART RATE: 77 BPM | DIASTOLIC BLOOD PRESSURE: 92 MMHG | OXYGEN SATURATION: 96 % | RESPIRATION RATE: 22 BRPM | WEIGHT: 187 LBS | TEMPERATURE: 98 F | SYSTOLIC BLOOD PRESSURE: 142 MMHG | HEIGHT: 63 IN

## 2021-11-12 DIAGNOSIS — Z20.822 EXPOSURE TO COVID-19 VIRUS: Primary | ICD-10-CM

## 2021-11-12 DIAGNOSIS — J06.9 VIRAL UPPER RESPIRATORY TRACT INFECTION: ICD-10-CM

## 2021-11-12 PROCEDURE — 99212 OFFICE O/P EST SF 10 MIN: CPT

## 2021-11-12 NOTE — ED PROVIDER NOTES
Patient Seen in: [facility] [place]      History   Patient presents with:  Cough/URI    Stated Complaint: covid test,exposed    Subjective:   HPI    18-year-old male who is fully vaccinated for COVID-19 comes in today complaining of nasal congest Current:BP (!) 142/92   Pulse 77   Temp 97.9 °F (36.6 °C) (Temporal)   Resp 22   Ht 160 cm (5' 3\")   Wt 84.8 kg   SpO2 96%   BMI 33.13 kg/m²         Physical Exam    General Appearance: Alert, cooperative, no distress, appropriate for age   Head: No following up with his doctor- Dom Savage MD  - as instructed. The patient verbalized understanding of the discharge instructions and plan.

## 2021-11-18 ENCOUNTER — HOSPITAL ENCOUNTER (OUTPATIENT)
Age: 76
Discharge: HOME | End: 2021-11-18
Payer: MEDICARE

## 2021-11-18 ENCOUNTER — TELEPHONE (OUTPATIENT)
Dept: INTERNAL MEDICINE CLINIC | Facility: CLINIC | Age: 76
End: 2021-11-18

## 2021-11-18 VITALS
RESPIRATION RATE: 16 BRPM | OXYGEN SATURATION: 97 % | HEIGHT: 65 IN | TEMPERATURE: 98 F | BODY MASS INDEX: 30.66 KG/M2 | WEIGHT: 184 LBS | SYSTOLIC BLOOD PRESSURE: 160 MMHG | DIASTOLIC BLOOD PRESSURE: 84 MMHG | HEART RATE: 74 BPM

## 2021-11-18 DIAGNOSIS — U07.1 COVID-19: Primary | ICD-10-CM

## 2021-11-18 PROCEDURE — 99214 OFFICE O/P EST MOD 30 MIN: CPT

## 2021-11-18 NOTE — TELEPHONE ENCOUNTER
Pt received PAB infusion at [address] [place] [address] on 11/18 for COVID-19. Please follow-up with pt for post-infusion assessment and home monitoring if needed. Thank you.

## 2021-11-18 NOTE — ED PROVIDER NOTES
Patient Seen in: [facility] [place]      History   Patient presents with:  Fever    Stated Complaint: tested neg last wk but symptoms are getting worse,wife tested positive, cough,f*    Subjective:   32-year-old male presents the IC with cough co No             Review of Systems   Constitutional: Positive for chills. HENT: Positive for congestion. Respiratory: Positive for cough. All other systems reviewed and are negative.       Positive for stated complaint: tested neg last wk but symptoms primary care physician and return to emerge of any worsening symptoms. Vital signs have been reviewed patient is afebrile nontoxic non-ill-appearing and in no acute distress.            Regen-COV Discussion  The patient has been deemed a candidate for Regen

## 2021-11-19 ENCOUNTER — TELEPHONE (OUTPATIENT)
Dept: INTERNAL MEDICINE CLINIC | Facility: CLINIC | Age: 76
End: 2021-11-19

## 2021-11-19 NOTE — TELEPHONE ENCOUNTER
Home Monitoring Condition Update    Covid19+ test date: 11/18/2021    Consent Verification:  Assessment Completed With: Patient  HIPAA Verified?   Yes    COVID-19 HOME MONITORING 11/19/2021   Temperature 99.2   Reading From Mouth   Exertion Level Climbing S

## 2021-11-22 ENCOUNTER — TELEPHONE (OUTPATIENT)
Dept: INTERNAL MEDICINE CLINIC | Facility: CLINIC | Age: 76
End: 2021-11-22

## 2021-11-29 DIAGNOSIS — I10 ESSENTIAL HYPERTENSION: ICD-10-CM

## 2021-11-30 NOTE — TELEPHONE ENCOUNTER
Protocol failed     Requesting:   Losartan-hydrochlorothiazide 100-12.5mg filled 3/25/21 #90 0 refills   Amlodipine 5mg filled 8/10/21 #90 0 refill      LOV: 3/25/21   RTC: 3 weeks   Recent Labs: 9/2/20     Upcoming OV: none scheduled

## 2021-12-01 RX ORDER — LOSARTAN POTASSIUM AND HYDROCHLOROTHIAZIDE 12.5; 1 MG/1; MG/1
1 TABLET ORAL
Qty: 90 TABLET | Refills: 0 | Status: SHIPPED | OUTPATIENT
Start: 2021-12-01

## 2021-12-01 RX ORDER — AMLODIPINE BESYLATE 5 MG/1
5 TABLET ORAL DAILY
Qty: 90 TABLET | Refills: 0 | Status: SHIPPED | OUTPATIENT
Start: 2021-12-01

## 2022-01-01 NOTE — PROGRESS NOTES
Jeannine Mccarty  1945 is a 71year old male.     Patient presents with:  Fever  Diarrhea      HPI:   Since yesterday as noted some abdominal cramping with black colored stools ×4 does not recollect taking the iron pills or Pepto-Bismol      Current urine no. Recurrent Urinary Tract Infection (UTI) no . Blood in urine no. Burning on urination no. Difficulty urinating no. Dysuria none. Flank pain no. Frequent Nighttime Urination none . Pain with urination none.  Urinary urgency none  Patient had these e No

## 2022-03-05 ENCOUNTER — OFFICE VISIT (OUTPATIENT)
Dept: INTERNAL MEDICINE CLINIC | Facility: CLINIC | Age: 77
End: 2022-03-05
Payer: MEDICARE

## 2022-03-05 VITALS
BODY MASS INDEX: 32.55 KG/M2 | WEIGHT: 186 LBS | HEART RATE: 75 BPM | HEIGHT: 63.25 IN | SYSTOLIC BLOOD PRESSURE: 132 MMHG | DIASTOLIC BLOOD PRESSURE: 66 MMHG | TEMPERATURE: 99 F | RESPIRATION RATE: 12 BRPM | OXYGEN SATURATION: 100 %

## 2022-03-05 DIAGNOSIS — Z00.00 ENCOUNTER FOR ANNUAL HEALTH EXAMINATION: Primary | ICD-10-CM

## 2022-03-05 DIAGNOSIS — Z11.59 NEED FOR HEPATITIS C SCREENING TEST: ICD-10-CM

## 2022-03-05 DIAGNOSIS — E78.00 PURE HYPERCHOLESTEROLEMIA: ICD-10-CM

## 2022-03-05 DIAGNOSIS — I10 ESSENTIAL HYPERTENSION: ICD-10-CM

## 2022-03-05 PROCEDURE — 3078F DIAST BP <80 MM HG: CPT | Performed by: PHYSICIAN ASSISTANT

## 2022-03-05 PROCEDURE — 99397 PER PM REEVAL EST PAT 65+ YR: CPT | Performed by: PHYSICIAN ASSISTANT

## 2022-03-05 PROCEDURE — 90732 PPSV23 VACC 2 YRS+ SUBQ/IM: CPT | Performed by: PHYSICIAN ASSISTANT

## 2022-03-05 PROCEDURE — G0009 ADMIN PNEUMOCOCCAL VACCINE: HCPCS | Performed by: PHYSICIAN ASSISTANT

## 2022-03-05 PROCEDURE — 96160 PT-FOCUSED HLTH RISK ASSMT: CPT | Performed by: PHYSICIAN ASSISTANT

## 2022-03-05 PROCEDURE — 3008F BODY MASS INDEX DOCD: CPT | Performed by: PHYSICIAN ASSISTANT

## 2022-03-05 PROCEDURE — 3075F SYST BP GE 130 - 139MM HG: CPT | Performed by: PHYSICIAN ASSISTANT

## 2022-03-05 PROCEDURE — G0439 PPPS, SUBSEQ VISIT: HCPCS | Performed by: PHYSICIAN ASSISTANT

## 2022-03-05 RX ORDER — PANTOPRAZOLE SODIUM 40 MG/1
40 TABLET, DELAYED RELEASE ORAL
Qty: 90 TABLET | Refills: 1 | Status: SHIPPED | OUTPATIENT
Start: 2022-03-05

## 2022-03-05 RX ORDER — LOSARTAN POTASSIUM AND HYDROCHLOROTHIAZIDE 12.5; 1 MG/1; MG/1
1 TABLET ORAL
Qty: 90 TABLET | Refills: 1 | Status: SHIPPED | OUTPATIENT
Start: 2022-03-05

## 2022-03-05 RX ORDER — AMLODIPINE BESYLATE 5 MG/1
5 TABLET ORAL DAILY
Qty: 90 TABLET | Refills: 1 | Status: SHIPPED | OUTPATIENT
Start: 2022-03-05

## 2022-03-05 NOTE — PATIENT INSTRUCTIONS
Blood work at [facility] 99 stool test ASAP. I recommend the following vaccine(s) at the pharmacy:  - newer version of the shingles vaccine [facility]). Please note this is a **2** part vaccine and may cost up to $200 per shot with Medicare insurance alone. - updated tetanus (TdaP) vaccine. *Please have a copy of your vaccine record faxed to Radha's office at [phone]. Follow up visit in 6 months.

## 2022-03-08 LAB
ALBUMIN/GLOBULIN RATIO: 1.4 (CALC) (ref 1–2.5)
ALBUMIN: 4.2 G/DL (ref 3.6–5.1)
ALKALINE PHOSPHATASE: 99 U/L (ref 35–144)
AST: 16 U/L (ref 10–35)
BILIRUBIN, TOTAL: 0.6 MG/DL (ref 0.2–1.2)
BUN: 16 MG/DL (ref 7–25)
CALCIUM: 9.4 MG/DL (ref 8.6–10.3)
CARBON DIOXIDE: 31 MMOL/L (ref 20–32)
CHLORIDE: 102 MMOL/L (ref 98–110)
CHOL/HDLC RATIO: 3.6 (CALC)
CHOLESTEROL, TOTAL: 191 MG/DL
CREATININE: 0.98 MG/DL (ref 0.7–1.18)
EGFR IF AFRICN AM: 87 ML/MIN/1.73M2
EGFR IF NONAFRICN AM: 75 ML/MIN/1.73M2
GLOBULIN: 2.9 G/DL (CALC) (ref 1.9–3.7)
GLUCOSE: 110 MG/DL (ref 65–99)
HDL CHOLESTEROL: 53 MG/DL
LDL-CHOLESTEROL: 120 MG/DL (CALC)
NON-HDL CHOLESTEROL: 138 MG/DL (CALC)
POTASSIUM: 3.7 MMOL/L (ref 3.5–5.3)
PROTEIN, TOTAL: 7.1 G/DL (ref 6.1–8.1)
SIGNAL TO CUT-OFF: 0.01
SODIUM: 143 MMOL/L (ref 135–146)
TRIGLYCERIDES: 79 MG/DL

## 2022-03-11 RX ORDER — ATORVASTATIN CALCIUM 10 MG/1
10 TABLET, FILM COATED ORAL NIGHTLY
Qty: 90 TABLET | Refills: 1 | Status: SHIPPED | OUTPATIENT
Start: 2022-03-11

## 2022-04-09 ENCOUNTER — HOSPITAL ENCOUNTER (OUTPATIENT)
Age: 77
Discharge: HOME | End: 2022-04-09
Attending: EMERGENCY MEDICINE
Payer: MEDICARE

## 2022-04-09 ENCOUNTER — APPOINTMENT (OUTPATIENT)
Dept: GENERAL RADIOLOGY | Age: 77
End: 2022-04-09
Attending: EMERGENCY MEDICINE
Payer: MEDICARE

## 2022-04-09 VITALS
TEMPERATURE: 99 F | OXYGEN SATURATION: 95 % | BODY MASS INDEX: 30.99 KG/M2 | RESPIRATION RATE: 20 BRPM | DIASTOLIC BLOOD PRESSURE: 66 MMHG | HEIGHT: 65 IN | HEART RATE: 68 BPM | SYSTOLIC BLOOD PRESSURE: 144 MMHG | WEIGHT: 186 LBS

## 2022-04-09 DIAGNOSIS — J98.01 BRONCHOSPASM: Primary | ICD-10-CM

## 2022-04-09 LAB — SARS-COV-2 RNA RESP QL NAA+PROBE: NOT DETECTED

## 2022-04-09 PROCEDURE — 99214 OFFICE O/P EST MOD 30 MIN: CPT

## 2022-04-09 PROCEDURE — 94640 AIRWAY INHALATION TREATMENT: CPT

## 2022-04-09 PROCEDURE — 71046 X-RAY EXAM CHEST 2 VIEWS: CPT | Performed by: EMERGENCY MEDICINE

## 2022-04-09 RX ORDER — ALBUTEROL SULFATE 90 UG/1
2 AEROSOL, METERED RESPIRATORY (INHALATION) EVERY 4 HOURS PRN
Qty: 1 EACH | Refills: 0 | Status: SHIPPED | OUTPATIENT
Start: 2022-04-09 | End: 2022-04-09

## 2022-04-09 RX ORDER — AZITHROMYCIN 250 MG/1
TABLET, FILM COATED ORAL
Qty: 6 TABLET | Refills: 0 | Status: SHIPPED | OUTPATIENT
Start: 2022-04-09 | End: 2022-04-14

## 2022-04-09 RX ORDER — AZITHROMYCIN 250 MG/1
TABLET, FILM COATED ORAL
Qty: 6 TABLET | Refills: 0 | Status: SHIPPED | OUTPATIENT
Start: 2022-04-09 | End: 2022-04-09

## 2022-04-09 RX ORDER — ALBUTEROL SULFATE 2.5 MG/3ML
2.5 SOLUTION RESPIRATORY (INHALATION) ONCE
Status: COMPLETED | OUTPATIENT
Start: 2022-04-09 | End: 2022-04-09

## 2022-04-09 RX ORDER — BENZONATATE 100 MG/1
100 CAPSULE ORAL 3 TIMES DAILY PRN
Qty: 30 CAPSULE | Refills: 0 | Status: SHIPPED | OUTPATIENT
Start: 2022-04-09 | End: 2022-05-09

## 2022-04-09 RX ORDER — BENZONATATE 100 MG/1
100 CAPSULE ORAL 3 TIMES DAILY PRN
Qty: 30 CAPSULE | Refills: 0 | Status: SHIPPED | OUTPATIENT
Start: 2022-04-09 | End: 2022-04-09

## 2022-04-09 RX ORDER — PREDNISONE 20 MG/1
20 TABLET ORAL 2 TIMES DAILY
Qty: 10 TABLET | Refills: 0 | Status: SHIPPED | OUTPATIENT
Start: 2022-04-09 | End: 2022-04-14

## 2022-04-09 RX ORDER — PREDNISONE 20 MG/1
20 TABLET ORAL 2 TIMES DAILY
Qty: 10 TABLET | Refills: 0 | Status: SHIPPED | OUTPATIENT
Start: 2022-04-09 | End: 2022-04-09

## 2022-04-09 RX ORDER — ALBUTEROL SULFATE 90 UG/1
2 AEROSOL, METERED RESPIRATORY (INHALATION) EVERY 4 HOURS PRN
Qty: 1 EACH | Refills: 0 | Status: SHIPPED | OUTPATIENT
Start: 2022-04-09 | End: 2022-05-09

## 2022-04-11 ENCOUNTER — OFFICE VISIT (OUTPATIENT)
Dept: INTERNAL MEDICINE CLINIC | Facility: CLINIC | Age: 77
End: 2022-04-11
Payer: MEDICARE

## 2022-04-11 VITALS
RESPIRATION RATE: 16 BRPM | WEIGHT: 182.19 LBS | OXYGEN SATURATION: 97 % | DIASTOLIC BLOOD PRESSURE: 70 MMHG | TEMPERATURE: 99 F | SYSTOLIC BLOOD PRESSURE: 140 MMHG | BODY MASS INDEX: 30.35 KG/M2 | HEIGHT: 65 IN | HEART RATE: 65 BPM

## 2022-04-11 DIAGNOSIS — R05.9 COUGH: Primary | ICD-10-CM

## 2022-04-11 DIAGNOSIS — R06.2 WHEEZING: ICD-10-CM

## 2022-04-11 PROCEDURE — 3077F SYST BP >= 140 MM HG: CPT | Performed by: PHYSICIAN ASSISTANT

## 2022-04-11 PROCEDURE — 99213 OFFICE O/P EST LOW 20 MIN: CPT | Performed by: PHYSICIAN ASSISTANT

## 2022-04-11 PROCEDURE — 3078F DIAST BP <80 MM HG: CPT | Performed by: PHYSICIAN ASSISTANT

## 2022-04-11 PROCEDURE — 3008F BODY MASS INDEX DOCD: CPT | Performed by: PHYSICIAN ASSISTANT

## 2022-06-15 ENCOUNTER — OFFICE VISIT (OUTPATIENT)
Dept: INTERNAL MEDICINE CLINIC | Facility: CLINIC | Age: 77
End: 2022-06-15
Payer: MEDICARE

## 2022-06-15 VITALS
TEMPERATURE: 99 F | RESPIRATION RATE: 16 BRPM | HEIGHT: 65 IN | OXYGEN SATURATION: 96 % | SYSTOLIC BLOOD PRESSURE: 115 MMHG | HEART RATE: 71 BPM | BODY MASS INDEX: 29.93 KG/M2 | DIASTOLIC BLOOD PRESSURE: 80 MMHG | WEIGHT: 179.63 LBS

## 2022-06-15 DIAGNOSIS — M79.672 LEFT FOOT PAIN: Primary | ICD-10-CM

## 2022-06-15 DIAGNOSIS — S93.602A SPRAIN OF LEFT FOOT, INITIAL ENCOUNTER: ICD-10-CM

## 2022-06-15 PROCEDURE — 99213 OFFICE O/P EST LOW 20 MIN: CPT | Performed by: INTERNAL MEDICINE

## 2022-06-15 PROCEDURE — 3008F BODY MASS INDEX DOCD: CPT | Performed by: INTERNAL MEDICINE

## 2022-06-15 PROCEDURE — 3079F DIAST BP 80-89 MM HG: CPT | Performed by: INTERNAL MEDICINE

## 2022-06-15 PROCEDURE — 3074F SYST BP LT 130 MM HG: CPT | Performed by: INTERNAL MEDICINE

## 2022-08-05 ENCOUNTER — TELEPHONE (OUTPATIENT)
Dept: INTERNAL MEDICINE CLINIC | Facility: CLINIC | Age: 77
End: 2022-08-05

## 2022-09-20 DIAGNOSIS — I10 ESSENTIAL HYPERTENSION: ICD-10-CM

## 2022-09-20 NOTE — TELEPHONE ENCOUNTER
Protocol passed     Requesting:   Losartan-hydrochlorothiazide 100-12.5mg   Amlodipine 5mg     LOV: 3/5/22   RTC: 6 months   Filled: 3/5/22 #90 1 refill   Recent Labs: 3/7/22     Upcoming OV: none scheduled

## 2022-09-23 RX ORDER — LOSARTAN POTASSIUM AND HYDROCHLOROTHIAZIDE 12.5; 1 MG/1; MG/1
1 TABLET ORAL
Qty: 90 TABLET | Refills: 0 | Status: SHIPPED | OUTPATIENT
Start: 2022-09-23

## 2022-09-23 RX ORDER — AMLODIPINE BESYLATE 5 MG/1
TABLET ORAL
Qty: 90 TABLET | Refills: 0 | Status: SHIPPED | OUTPATIENT
Start: 2022-09-23

## 2023-01-06 DIAGNOSIS — I10 ESSENTIAL HYPERTENSION: ICD-10-CM

## 2023-01-06 RX ORDER — AMLODIPINE BESYLATE 5 MG/1
TABLET ORAL
Qty: 90 TABLET | Refills: 0 | Status: SHIPPED | OUTPATIENT
Start: 2023-01-06

## 2023-01-06 RX ORDER — LOSARTAN POTASSIUM AND HYDROCHLOROTHIAZIDE 12.5; 1 MG/1; MG/1
1 TABLET ORAL
Qty: 90 TABLET | Refills: 0 | Status: SHIPPED | OUTPATIENT
Start: 2023-01-06

## 2023-01-19 ENCOUNTER — OFFICE VISIT (OUTPATIENT)
Dept: INTERNAL MEDICINE CLINIC | Facility: CLINIC | Age: 78
End: 2023-01-19
Payer: MEDICARE

## 2023-01-19 VITALS
SYSTOLIC BLOOD PRESSURE: 130 MMHG | TEMPERATURE: 99 F | RESPIRATION RATE: 16 BRPM | HEIGHT: 65 IN | BODY MASS INDEX: 30.39 KG/M2 | OXYGEN SATURATION: 98 % | DIASTOLIC BLOOD PRESSURE: 60 MMHG | HEART RATE: 79 BPM | WEIGHT: 182.38 LBS

## 2023-01-19 DIAGNOSIS — R20.0 NUMBNESS AND TINGLING IN RIGHT HAND: ICD-10-CM

## 2023-01-19 DIAGNOSIS — R97.20 ELEVATED PSA: ICD-10-CM

## 2023-01-19 DIAGNOSIS — R20.2 NUMBNESS AND TINGLING IN RIGHT HAND: ICD-10-CM

## 2023-01-19 DIAGNOSIS — E78.00 PURE HYPERCHOLESTEROLEMIA: ICD-10-CM

## 2023-01-19 DIAGNOSIS — I10 ESSENTIAL HYPERTENSION: ICD-10-CM

## 2023-01-19 DIAGNOSIS — Z00.00 ENCOUNTER FOR ANNUAL HEALTH EXAMINATION: Primary | ICD-10-CM

## 2023-01-19 DIAGNOSIS — K21.9 GASTROESOPHAGEAL REFLUX DISEASE, UNSPECIFIED WHETHER ESOPHAGITIS PRESENT: ICD-10-CM

## 2023-01-19 DIAGNOSIS — R73.01 IFG (IMPAIRED FASTING GLUCOSE): ICD-10-CM

## 2023-01-19 PROCEDURE — 96160 PT-FOCUSED HLTH RISK ASSMT: CPT | Performed by: PHYSICIAN ASSISTANT

## 2023-01-19 PROCEDURE — 3008F BODY MASS INDEX DOCD: CPT | Performed by: PHYSICIAN ASSISTANT

## 2023-01-19 PROCEDURE — G0439 PPPS, SUBSEQ VISIT: HCPCS | Performed by: PHYSICIAN ASSISTANT

## 2023-01-19 PROCEDURE — 99397 PER PM REEVAL EST PAT 65+ YR: CPT | Performed by: PHYSICIAN ASSISTANT

## 2023-01-19 PROCEDURE — 3078F DIAST BP <80 MM HG: CPT | Performed by: PHYSICIAN ASSISTANT

## 2023-01-19 PROCEDURE — 1125F AMNT PAIN NOTED PAIN PRSNT: CPT | Performed by: PHYSICIAN ASSISTANT

## 2023-01-19 PROCEDURE — 3075F SYST BP GE 130 - 139MM HG: CPT | Performed by: PHYSICIAN ASSISTANT

## 2023-01-19 RX ORDER — ROSUVASTATIN CALCIUM 5 MG/1
5 TABLET, COATED ORAL NIGHTLY
Qty: 90 TABLET | Refills: 1 | Status: SHIPPED | OUTPATIENT
Start: 2023-01-19

## 2023-01-19 RX ORDER — PANTOPRAZOLE SODIUM 40 MG/1
40 TABLET, DELAYED RELEASE ORAL
Qty: 90 TABLET | Refills: 1 | Status: SHIPPED | OUTPATIENT
Start: 2023-01-19

## 2023-01-19 NOTE — PATIENT INSTRUCTIONS
Please use [employer] or call Singh Goldman at [phone] to set up the following tests:  - fasting blood work    Cholesterol:  - start rosuvastatin 5 mg nightly  - notify Radha's office if you have side effects

## 2023-03-09 ENCOUNTER — LAB ENCOUNTER (OUTPATIENT)
Dept: LAB | Age: 78
End: 2023-03-09
Attending: PHYSICIAN ASSISTANT
Payer: MEDICARE

## 2023-03-09 DIAGNOSIS — R97.20 ELEVATED PSA: ICD-10-CM

## 2023-03-09 DIAGNOSIS — E78.00 PURE HYPERCHOLESTEROLEMIA: ICD-10-CM

## 2023-03-09 DIAGNOSIS — K21.9 GASTROESOPHAGEAL REFLUX DISEASE, UNSPECIFIED WHETHER ESOPHAGITIS PRESENT: ICD-10-CM

## 2023-03-09 DIAGNOSIS — R73.01 IFG (IMPAIRED FASTING GLUCOSE): ICD-10-CM

## 2023-03-09 DIAGNOSIS — I10 ESSENTIAL HYPERTENSION: ICD-10-CM

## 2023-03-09 LAB
ALT SERPL-CCNC: 35 U/L
ANION GAP SERPL CALC-SCNC: 4 MMOL/L (ref 0–18)
AST SERPL-CCNC: 20 U/L (ref 15–37)
BUN BLD-MCNC: 19 MG/DL (ref 7–18)
CALCIUM BLD-MCNC: 9.5 MG/DL (ref 8.5–10.1)
CHLORIDE SERPL-SCNC: 110 MMOL/L (ref 98–112)
CHOLEST SERPL-MCNC: 186 MG/DL (ref ?–200)
CO2 SERPL-SCNC: 28 MMOL/L (ref 21–32)
CREAT BLD-MCNC: 1.06 MG/DL
EST. AVERAGE GLUCOSE BLD GHB EST-MCNC: 123 MG/DL (ref 68–126)
FASTING PATIENT LIPID ANSWER: YES
FASTING STATUS PATIENT QL REPORTED: YES
GFR SERPLBLD BASED ON 1.73 SQ M-ARVRAT: 73 ML/MIN/1.73M2 (ref 60–?)
GLUCOSE BLD-MCNC: 107 MG/DL (ref 70–99)
HBA1C MFR BLD: 5.9 % (ref ?–5.7)
HDLC SERPL-MCNC: 56 MG/DL (ref 40–59)
LDLC SERPL CALC-MCNC: 116 MG/DL (ref ?–100)
NONHDLC SERPL-MCNC: 130 MG/DL (ref ?–130)
OSMOLALITY SERPL CALC.SUM OF ELEC: 297 MOSM/KG (ref 275–295)
POTASSIUM SERPL-SCNC: 3.8 MMOL/L (ref 3.5–5.1)
PSA SERPL-MCNC: 10.4 NG/ML (ref ?–4)
SODIUM SERPL-SCNC: 142 MMOL/L (ref 136–145)
TRIGL SERPL-MCNC: 73 MG/DL (ref 30–149)
VLDLC SERPL CALC-MCNC: 13 MG/DL (ref 0–30)

## 2023-03-09 PROCEDURE — 80061 LIPID PANEL: CPT

## 2023-03-09 PROCEDURE — 84450 TRANSFERASE (AST) (SGOT): CPT

## 2023-03-09 PROCEDURE — 80048 BASIC METABOLIC PNL TOTAL CA: CPT

## 2023-03-09 PROCEDURE — 84153 ASSAY OF PSA TOTAL: CPT

## 2023-03-09 PROCEDURE — 36415 COLL VENOUS BLD VENIPUNCTURE: CPT

## 2023-03-09 PROCEDURE — 83036 HEMOGLOBIN GLYCOSYLATED A1C: CPT

## 2023-03-09 PROCEDURE — 84460 ALANINE AMINO (ALT) (SGPT): CPT

## 2023-03-15 ENCOUNTER — TELEPHONE (OUTPATIENT)
Dept: INTERNAL MEDICINE CLINIC | Facility: CLINIC | Age: 78
End: 2023-03-15

## 2023-03-15 DIAGNOSIS — E78.00 PURE HYPERCHOLESTEROLEMIA: Primary | ICD-10-CM

## 2023-03-15 RX ORDER — ROSUVASTATIN CALCIUM 5 MG/1
5 TABLET, COATED ORAL NIGHTLY
Qty: 90 TABLET | Refills: 1 | Status: SHIPPED | OUTPATIENT
Start: 2023-03-15

## 2023-03-15 NOTE — TELEPHONE ENCOUNTER
LL-  Rosuvastatin script pended for your review and approval (to pharmacy requested by patient)    During phone call with patient today to share recent lab results, patient states he never picked up cholesterol medication when ordered in January 2022, as it was sent to the wrong pharmacy ([facility] [place] [facility]), and he was previously having muscle aches when he was taking. Patient willing to try medication again but would like script sent to [id number] [place] [address] .

## 2023-03-15 NOTE — TELEPHONE ENCOUNTER
----- Message from JAMAAL Kay sent at 3/13/2023  2:10 PM CDT -----  - a1c elevated in pre-DM range.   Focus on low carb diet and regular exercise.  - cholesterol a bit elevated -- cont rosuvastatin if he's tolerating it  - PSA remains elevated -- f/u with urology (he sees Dr Letty Hernandez)  - rest of labs stable

## 2023-04-08 DIAGNOSIS — I10 ESSENTIAL HYPERTENSION: ICD-10-CM

## 2023-04-10 RX ORDER — LOSARTAN POTASSIUM AND HYDROCHLOROTHIAZIDE 12.5; 1 MG/1; MG/1
1 TABLET ORAL
Qty: 90 TABLET | Refills: 0 | Status: SHIPPED | OUTPATIENT
Start: 2023-04-10

## 2023-04-25 RX ORDER — AMLODIPINE BESYLATE 5 MG/1
TABLET ORAL
Qty: 90 TABLET | Refills: 0 | Status: SHIPPED | OUTPATIENT
Start: 2023-04-25

## 2023-04-25 RX ORDER — PANTOPRAZOLE SODIUM 40 MG/1
TABLET, DELAYED RELEASE ORAL
Qty: 90 TABLET | Refills: 0 | Status: SHIPPED | OUTPATIENT
Start: 2023-04-25

## 2023-07-20 ENCOUNTER — HOSPITAL ENCOUNTER (OUTPATIENT)
Dept: GENERAL RADIOLOGY | Age: 78
Discharge: HOME | End: 2023-07-20
Attending: INTERNAL MEDICINE
Payer: MEDICARE

## 2023-07-20 ENCOUNTER — OFFICE VISIT (OUTPATIENT)
Dept: INTERNAL MEDICINE CLINIC | Facility: CLINIC | Age: 78
End: 2023-07-20
Payer: MEDICARE

## 2023-07-20 VITALS
DIASTOLIC BLOOD PRESSURE: 60 MMHG | TEMPERATURE: 98 F | SYSTOLIC BLOOD PRESSURE: 130 MMHG | BODY MASS INDEX: 28.89 KG/M2 | WEIGHT: 173.38 LBS | RESPIRATION RATE: 16 BRPM | HEART RATE: 63 BPM | HEIGHT: 65 IN | OXYGEN SATURATION: 99 %

## 2023-07-20 DIAGNOSIS — M17.11 PRIMARY OSTEOARTHRITIS OF RIGHT KNEE: Primary | ICD-10-CM

## 2023-07-20 DIAGNOSIS — M17.11 OSTEOARTHRITIS OF RIGHT KNEE: ICD-10-CM

## 2023-07-20 DIAGNOSIS — Z01.89 ENCOUNTER FOR LOWER EXTREMITY COMPARISON IMAGING STUDY: ICD-10-CM

## 2023-07-20 DIAGNOSIS — M17.11 PRIMARY OSTEOARTHRITIS OF RIGHT KNEE: ICD-10-CM

## 2023-07-20 PROCEDURE — 3008F BODY MASS INDEX DOCD: CPT | Performed by: INTERNAL MEDICINE

## 2023-07-20 PROCEDURE — 3078F DIAST BP <80 MM HG: CPT | Performed by: INTERNAL MEDICINE

## 2023-07-20 PROCEDURE — 73562 X-RAY EXAM OF KNEE 3: CPT | Performed by: INTERNAL MEDICINE

## 2023-07-20 PROCEDURE — 99213 OFFICE O/P EST LOW 20 MIN: CPT | Performed by: INTERNAL MEDICINE

## 2023-07-20 PROCEDURE — 1159F MED LIST DOCD IN RCRD: CPT | Performed by: INTERNAL MEDICINE

## 2023-07-20 PROCEDURE — 3075F SYST BP GE 130 - 139MM HG: CPT | Performed by: INTERNAL MEDICINE

## 2023-07-20 PROCEDURE — 73564 X-RAY EXAM KNEE 4 OR MORE: CPT | Performed by: INTERNAL MEDICINE

## 2023-07-20 RX ORDER — AMLODIPINE BESYLATE 5 MG/1
5 TABLET ORAL DAILY
Qty: 90 TABLET | Refills: 0 | Status: SHIPPED | OUTPATIENT
Start: 2023-07-20

## 2023-07-25 DIAGNOSIS — I10 ESSENTIAL HYPERTENSION: ICD-10-CM

## 2023-07-25 RX ORDER — LOSARTAN POTASSIUM AND HYDROCHLOROTHIAZIDE 12.5; 1 MG/1; MG/1
1 TABLET ORAL
Qty: 90 TABLET | Refills: 0 | Status: SHIPPED | OUTPATIENT
Start: 2023-07-25

## 2023-07-25 NOTE — TELEPHONE ENCOUNTER
Name from pharmacy: Losartan Potassium-HCTZ 100-12.5 MG Oral Tablet         Will file in chart as: LOSARTAN POTASSIUM-HCTZ 100-12.5 MG Oral Tab    Sig: Take 1 tablet by mouth once daily    Disp: 90 tablet    Refills: 0    Start: 7/25/2023    Class: Normal    Non-formulary For: Essential hypertension    Last ordered: 3 months ago by JAMAAL Small Last refill: 4/10/2023    Rx #: [id number]    Hypertension Medications Protocol [id number]/25/2023 11:21 AM   Protocol Details CMP or BMP in past 12 months    Last serum creatinine< 2.0    Appointment in past 6 or next 3 months      To be filled at: [address], [address]: 7/20/23  RTC: 6 months  Recent Labs: 3/9/23    Upcoming OV  none

## 2023-10-31 RX ORDER — AMLODIPINE BESYLATE 5 MG/1
5 TABLET ORAL DAILY
Qty: 90 TABLET | Refills: 0 | Status: SHIPPED | OUTPATIENT
Start: 2023-10-31

## 2023-10-31 NOTE — TELEPHONE ENCOUNTER
Name from pharmacy: amLODIPine Besylate 5 MG Oral Tablet         Will file in chart as: AMLODIPINE 5 MG Oral Tab    Sig: Take 1 tablet by mouth once daily    Disp: 90 tablet    Refills: 0    Start: 10/30/2023    Class: Normal    Non-formulary    Last ordered: 3 months ago (7/20/2023) by Tc Medellin MD    Last refill: 7/20/2023    Rx #: [id number]    Hypertension Medications Protocol Wzctcr70/30/2023 12:45 PM   Protocol Details CMP or BMP in past 12 months    Last serum creatinine< 2.0    Appointment in past 6 or next 3 months      [facility] [address] [facility] [phone], [phone]

## 2023-11-02 DIAGNOSIS — I10 ESSENTIAL HYPERTENSION: ICD-10-CM

## 2023-11-03 RX ORDER — LOSARTAN POTASSIUM AND HYDROCHLOROTHIAZIDE 12.5; 1 MG/1; MG/1
1 TABLET ORAL
Qty: 90 TABLET | Refills: 0 | Status: SHIPPED | OUTPATIENT
Start: 2023-11-03

## 2023-11-08 ENCOUNTER — TELEPHONE (OUTPATIENT)
Dept: INTERNAL MEDICINE CLINIC | Facility: CLINIC | Age: 78
End: 2023-11-08

## 2023-11-08 NOTE — TELEPHONE ENCOUNTER
Reached patient for medication adherence consult. Per insurance report, patient is past due for refill on rosuvastatin. Patient tells me he stopped taking the rosuvastatin because it was bothering his stomach. Asked to elaborate further and patient said it caused his stomach to be upset and painful. Asked if symptoms resolved after stopping the medication and patient reports he is still occasionally dealing with stomach issues. Discussed with patient that upset stomach in not a common side effect of rosuvastatin. Did recommend patient discuss this with PCP. He tells me he plans to discuss with him at appointment in January. Did let patient know that there are other statin medications that he could try if he is not willing to resume rosuvastatin. Did provide education, discussed indication and really stressed the importance of taking his cholesterol medication consistently everyday to get the most benefit. Patient again lets me know he will discuss with PCP at next appointment. Patient also tells me the pharmacy was having trouble getting his blood pressure medication in from the . Looks like losartan-hydrochlorothiazide might be having some supply/backorder issues. He was able to obtain a 30 day supply and plans to contact pharmacy for the remainder soon. Let patient know that medication can be split into two separate pills if the pharmacy continues to be unable to obtain the combination product. Patient confirmed understanding and denies any other questions or concerns with medications at this time.

## 2024-02-08 RX ORDER — AMLODIPINE BESYLATE 5 MG/1
5 TABLET ORAL DAILY
Qty: 90 TABLET | Refills: 0 | Status: SHIPPED | OUTPATIENT
Start: 2024-02-08

## 2024-02-08 RX ORDER — AMLODIPINE BESYLATE 5 MG/1
5 TABLET ORAL DAILY
Qty: 90 TABLET | Refills: 0 | OUTPATIENT
Start: 2024-02-08

## 2024-02-08 NOTE — TELEPHONE ENCOUNTER
LOV: 7/20/23   RTC none noted  Filled: 10/31/23 # 90   Labs 3/9/23   Future Appointments   Date Time Provider Department Center   2/22/2024 10:00 AM Mike Buchanan MD [facility] [place]

## 2024-02-09 DIAGNOSIS — I10 ESSENTIAL HYPERTENSION: ICD-10-CM

## 2024-02-12 RX ORDER — LOSARTAN POTASSIUM AND HYDROCHLOROTHIAZIDE 12.5; 1 MG/1; MG/1
1 TABLET ORAL
Qty: 90 TABLET | Refills: 0 | Status: SHIPPED | OUTPATIENT
Start: 2024-02-12

## 2024-02-12 RX ORDER — AMLODIPINE BESYLATE 5 MG/1
5 TABLET ORAL DAILY
Qty: 90 TABLET | Refills: 0 | OUTPATIENT
Start: 2024-02-12

## 2024-02-12 NOTE — TELEPHONE ENCOUNTER
Requesting    Name from pharmacy: Losartan Potassium-HCTZ 100-12.5 MG Oral Tablet         Will file in chart as: LOSARTAN POTASSIUM-HCTZ 100-12.5 MG Oral Tab    Sig: Take 1 tablet by mouth once daily.    Original sig: Take 1 tablet by mouth once daily    Disp: 90 tablet    Refills: 0    Start: 2/9/2024    Class: Normal    Non-formulary For: Essential hypertension    Last ordered: 3 months ago (11/3/2023) by Mike Buchanan MD    Last refill: 1/2/2024    Rx #: [id number]    Hypertension Medications Protocol [id number]/09/2024 11:08 AM   Protocol Details CMP or BMP in past 12 months    Last BP reading less than 140/90    In person appointment or virtual visit in the past 12 mos or appointment in next 3 mos    EGFRCR or GFRNAA > 50                                                             LOV: 7/20/2023  RTC: none noted   Last Relevant Labs: 3/9/2023  Filled: 11/3/2023 #90 with 0 refills    Future Appointments   Date Time Provider Department Center   2/22/2024 10:00 AM Mike Buchanan MD [facility] [place]

## 2024-02-22 ENCOUNTER — LAB ENCOUNTER (OUTPATIENT)
Dept: LAB | Age: 79
End: 2024-02-22
Attending: INTERNAL MEDICINE
Payer: MEDICARE

## 2024-02-22 ENCOUNTER — OFFICE VISIT (OUTPATIENT)
Dept: INTERNAL MEDICINE CLINIC | Facility: CLINIC | Age: 79
End: 2024-02-22
Payer: MEDICARE

## 2024-02-22 VITALS
TEMPERATURE: 97 F | SYSTOLIC BLOOD PRESSURE: 140 MMHG | BODY MASS INDEX: 28.99 KG/M2 | WEIGHT: 174 LBS | HEIGHT: 65 IN | DIASTOLIC BLOOD PRESSURE: 70 MMHG | HEART RATE: 67 BPM | RESPIRATION RATE: 16 BRPM | OXYGEN SATURATION: 97 %

## 2024-02-22 DIAGNOSIS — G89.29 CHRONIC RIGHT SHOULDER PAIN: ICD-10-CM

## 2024-02-22 DIAGNOSIS — M20.42 HAMMERTOES OF BOTH FEET: ICD-10-CM

## 2024-02-22 DIAGNOSIS — E78.00 PURE HYPERCHOLESTEROLEMIA: Chronic | ICD-10-CM

## 2024-02-22 DIAGNOSIS — Z00.00 ROUTINE GENERAL MEDICAL EXAMINATION AT A HEALTH CARE FACILITY: Primary | ICD-10-CM

## 2024-02-22 DIAGNOSIS — I10 ESSENTIAL HYPERTENSION: Chronic | ICD-10-CM

## 2024-02-22 DIAGNOSIS — R97.20 ELEVATED PSA: ICD-10-CM

## 2024-02-22 DIAGNOSIS — Z00.00 ROUTINE GENERAL MEDICAL EXAMINATION AT A HEALTH CARE FACILITY: ICD-10-CM

## 2024-02-22 DIAGNOSIS — M20.41 HAMMERTOES OF BOTH FEET: ICD-10-CM

## 2024-02-22 DIAGNOSIS — M25.511 CHRONIC RIGHT SHOULDER PAIN: ICD-10-CM

## 2024-02-22 LAB
ALBUMIN SERPL-MCNC: 4.2 G/DL (ref 3.4–5)
ALBUMIN/GLOB SERPL: 1.2 {RATIO} (ref 1–2)
ALP LIVER SERPL-CCNC: 105 U/L
ALT SERPL-CCNC: 25 U/L
ANION GAP SERPL CALC-SCNC: 4 MMOL/L (ref 0–18)
AST SERPL-CCNC: 19 U/L (ref 15–37)
ATRIAL RATE: 61 BPM
BASOPHILS # BLD AUTO: 0.02 X10(3) UL (ref 0–0.2)
BASOPHILS NFR BLD AUTO: 0.3 %
BILIRUB SERPL-MCNC: 1 MG/DL (ref 0.1–2)
BILIRUB UR QL STRIP.AUTO: NEGATIVE
BUN BLD-MCNC: 13 MG/DL (ref 9–23)
CALCIUM BLD-MCNC: 9.2 MG/DL (ref 8.5–10.1)
CHLORIDE SERPL-SCNC: 110 MMOL/L (ref 98–112)
CHOLEST SERPL-MCNC: 180 MG/DL (ref ?–200)
CLARITY UR REFRACT.AUTO: CLEAR
CO2 SERPL-SCNC: 29 MMOL/L (ref 21–32)
COMPLEXED PSA SERPL-MCNC: 13.5 NG/ML (ref ?–4)
CREAT BLD-MCNC: 1.16 MG/DL
EGFRCR SERPLBLD CKD-EPI 2021: 65 ML/MIN/1.73M2 (ref 60–?)
EOSINOPHIL # BLD AUTO: 0.16 X10(3) UL (ref 0–0.7)
EOSINOPHIL NFR BLD AUTO: 2.7 %
ERYTHROCYTE [DISTWIDTH] IN BLOOD BY AUTOMATED COUNT: 13.8 %
EST. AVERAGE GLUCOSE BLD GHB EST-MCNC: 123 MG/DL (ref 68–126)
FASTING PATIENT LIPID ANSWER: YES
FASTING STATUS PATIENT QL REPORTED: YES
GLOBULIN PLAS-MCNC: 3.4 G/DL (ref 2.8–4.4)
GLUCOSE BLD-MCNC: 100 MG/DL (ref 70–99)
GLUCOSE UR STRIP.AUTO-MCNC: NORMAL MG/DL
HBA1C MFR BLD: 5.9 % (ref ?–5.7)
HCT VFR BLD AUTO: 41.2 %
HDLC SERPL-MCNC: 60 MG/DL (ref 40–59)
HGB BLD-MCNC: 14.2 G/DL
IMM GRANULOCYTES # BLD AUTO: 0.03 X10(3) UL (ref 0–1)
IMM GRANULOCYTES NFR BLD: 0.5 %
KETONES UR STRIP.AUTO-MCNC: NEGATIVE MG/DL
LDLC SERPL CALC-MCNC: 109 MG/DL (ref ?–100)
LEUKOCYTE ESTERASE UR QL STRIP.AUTO: NEGATIVE
LYMPHOCYTES # BLD AUTO: 1.48 X10(3) UL (ref 1–4)
LYMPHOCYTES NFR BLD AUTO: 24.6 %
MCH RBC QN AUTO: 29.7 PG (ref 26–34)
MCHC RBC AUTO-ENTMCNC: 34.5 G/DL (ref 31–37)
MCV RBC AUTO: 86.2 FL
MONOCYTES # BLD AUTO: 0.43 X10(3) UL (ref 0.1–1)
MONOCYTES NFR BLD AUTO: 7.1 %
NEUTROPHILS # BLD AUTO: 3.9 X10 (3) UL (ref 1.5–7.7)
NEUTROPHILS # BLD AUTO: 3.9 X10(3) UL (ref 1.5–7.7)
NEUTROPHILS NFR BLD AUTO: 64.8 %
NITRITE UR QL STRIP.AUTO: NEGATIVE
NONHDLC SERPL-MCNC: 120 MG/DL (ref ?–130)
OSMOLALITY SERPL CALC.SUM OF ELEC: 296 MOSM/KG (ref 275–295)
P AXIS: -24 DEGREES
P-R INTERVAL: 154 MS
PH UR STRIP.AUTO: 6 [PH] (ref 5–8)
PLATELET # BLD AUTO: 188 10(3)UL (ref 150–450)
POTASSIUM SERPL-SCNC: 3.3 MMOL/L (ref 3.5–5.1)
PROT SERPL-MCNC: 7.6 G/DL (ref 6.4–8.2)
PROT UR STRIP.AUTO-MCNC: NEGATIVE MG/DL
Q-T INTERVAL: 428 MS
QRS DURATION: 80 MS
QTC CALCULATION (BEZET): 430 MS
R AXIS: 3 DEGREES
RBC # BLD AUTO: 4.78 X10(6)UL
RBC UR QL AUTO: NEGATIVE
SODIUM SERPL-SCNC: 143 MMOL/L (ref 136–145)
SP GR UR STRIP.AUTO: 1.01 (ref 1–1.03)
T AXIS: -9 DEGREES
T4 SERPL-MCNC: 8.5 UG/DL
TRIGL SERPL-MCNC: 59 MG/DL (ref 30–149)
TSI SER-ACNC: 1.24 MIU/ML (ref 0.36–3.74)
UROBILINOGEN UR STRIP.AUTO-MCNC: NORMAL MG/DL
VENTRICULAR RATE: 61 BPM
VLDLC SERPL CALC-MCNC: 10 MG/DL (ref 0–30)
WBC # BLD AUTO: 6 X10(3) UL (ref 4–11)

## 2024-02-22 PROCEDURE — 36415 COLL VENOUS BLD VENIPUNCTURE: CPT | Performed by: INTERNAL MEDICINE

## 2024-02-22 PROCEDURE — 1170F FXNL STATUS ASSESSED: CPT | Performed by: INTERNAL MEDICINE

## 2024-02-22 PROCEDURE — 1159F MED LIST DOCD IN RCRD: CPT | Performed by: INTERNAL MEDICINE

## 2024-02-22 PROCEDURE — 85025 COMPLETE CBC W/AUTO DIFF WBC: CPT | Performed by: INTERNAL MEDICINE

## 2024-02-22 PROCEDURE — 93000 ELECTROCARDIOGRAM COMPLETE: CPT | Performed by: INTERNAL MEDICINE

## 2024-02-22 PROCEDURE — 84436 ASSAY OF TOTAL THYROXINE: CPT | Performed by: INTERNAL MEDICINE

## 2024-02-22 PROCEDURE — 80053 COMPREHEN METABOLIC PANEL: CPT | Performed by: INTERNAL MEDICINE

## 2024-02-22 PROCEDURE — 3077F SYST BP >= 140 MM HG: CPT | Performed by: INTERNAL MEDICINE

## 2024-02-22 PROCEDURE — 80061 LIPID PANEL: CPT | Performed by: INTERNAL MEDICINE

## 2024-02-22 PROCEDURE — 1125F AMNT PAIN NOTED PAIN PRSNT: CPT | Performed by: INTERNAL MEDICINE

## 2024-02-22 PROCEDURE — 1160F RVW MEDS BY RX/DR IN RCRD: CPT | Performed by: INTERNAL MEDICINE

## 2024-02-22 PROCEDURE — 3008F BODY MASS INDEX DOCD: CPT | Performed by: INTERNAL MEDICINE

## 2024-02-22 PROCEDURE — 83036 HEMOGLOBIN GLYCOSYLATED A1C: CPT | Performed by: INTERNAL MEDICINE

## 2024-02-22 PROCEDURE — 96160 PT-FOCUSED HLTH RISK ASSMT: CPT | Performed by: INTERNAL MEDICINE

## 2024-02-22 PROCEDURE — 3078F DIAST BP <80 MM HG: CPT | Performed by: INTERNAL MEDICINE

## 2024-02-22 PROCEDURE — 84443 ASSAY THYROID STIM HORMONE: CPT | Performed by: INTERNAL MEDICINE

## 2024-02-22 PROCEDURE — 81003 URINALYSIS AUTO W/O SCOPE: CPT | Performed by: INTERNAL MEDICINE

## 2024-02-22 PROCEDURE — G0439 PPPS, SUBSEQ VISIT: HCPCS | Performed by: INTERNAL MEDICINE

## 2024-02-22 NOTE — PROGRESS NOTES
REASON FOR VISIT:    Mark Olivarez is a 78 year old male who presents for a MA Supervisit.    male     Patient Care Team: Patient Care Team:  Mike Buchanan MD as PCP - General (Internal Medicine)    Patient Active Problem List   Diagnosis    Essential hypertension    Pure hypercholesterolemia    Elevated PSA     Current Outpatient Medications   Medication Sig Dispense Refill    LOSARTAN POTASSIUM-HCTZ 100-12.5 MG Oral Tab Take 1 tablet by mouth once daily 90 tablet 0    AMLODIPINE 5 MG Oral Tab Take 1 tablet by mouth once daily 90 tablet 0    aspirin 81 MG Oral Tab Take 1 tablet (81 mg total) by mouth daily.             Latest Ref Rng & Units 3/9/2023     7:24 AM 3/7/2022     8:03 AM 10/31/2020    11:09 AM 9/2/2020     9:42 AM 2/7/2020     9:44 AM 1/31/2019     9:02 AM 5/3/2018     2:00 PM   Glucose and HbA1c   Glucose 70 - 99 mg/dL 107  110  95  100  103  97  94          Latest Ref Rng & Units 3/9/2023     7:24 AM 3/7/2022     8:03 AM 9/2/2020     9:42 AM 2/7/2020     9:44 AM 1/31/2019     9:02 AM 10/6/2017     7:39 AM 5/3/2017     8:14 AM   Cholesterol   Total Cholesterol <200 mg/dL 186  191  198  202  159  153  178    Triglycerides 30 - 149 mg/dL 73  79  56  70  65  79  93    HDL 40 - 59 mg/dL 56  53  56  57  51  54  61    LDL <100 mg/dL 116  120  131  131  95  83  98          Latest Ref Rng & Units 3/9/2023     7:24 AM 3/7/2022     8:03 AM 10/31/2020    11:09 AM 9/2/2020     9:42 AM 2/7/2020     9:44 AM 1/31/2019     9:02 AM 5/3/2018     2:00 PM   BUN and Cr   BUN 7 - 18 mg/dL 19  16  16  18  15  18  11    Creatinine 0.70 - 1.30 mg/dL 1.06  0.98  1.09  1.09  1.16  0.89  0.96          Latest Ref Rng & Units 3/9/2023     7:24 AM 3/7/2022     8:03 AM 10/31/2020    11:09 AM 9/2/2020     9:42 AM 2/7/2020     9:44 AM 1/31/2019     9:02 AM 4/23/2018     8:48 PM   AST and ALT   AST (SGOT) 15 - 37 U/L 20  16  17  19  18  20  21    ALT (SGPT) 16 - 61 U/L 35  26  37  37  41  36  36          Latest Ref Rng & Units  2/7/2020     9:44 AM 1/31/2019     9:02 AM 10/6/2017     7:39 AM   TSH and Free T4   TSH 0.358 - 3.740 mIU/mL 0.960  1.150  1.140          Latest Ref Rng & Units 3/9/2023     7:24 AM 7/1/2021     3:14 PM 9/2/2020     9:42 AM 2/7/2020     9:44 AM 1/31/2019     9:02 AM   [facility] REVIEW FLOWSHEET PSA   PSA <=4.00 ng/mL 10.40  9.96  6.84  8.11  8.51        General Health      In the past six months, have you lost more than 10 pounds without trying?: 2 - No  Has your appetite been poor?: No  Type of Diet: Balanced  How does the patient maintain a good energy level?: Appropriate Exercise  How would you describe your daily physical activity?: Heavy  How would you describe your current health state?: Good  How do you maintain positive mental well-being?: Social Interaction;Visiting Friends;Visiting Family  Have you had any immunizations at another office such as Influenza, Hepatitis B, Tetanus, or Pneumococcal?: No     Functional Ability     Bathing or Showering: Able without help  Toileting: Able without help  Dressing: Able without help  Eating: Able without help  Driving: Able without help  Preparing your meals: Able without help  Managing money/bills: Able without help  Taking medications as prescribed: Able without help  Are you able to afford your medications?: Yes  Hearing Problems?: No     Functional Status     Hearing Problems?: No  Vision Problems? : No  Difficulty walking?: No  Difficulty dressing or bathing?: No  Problems with daily activities? : No  Memory Problems?: No      Fall/Risk Assessment                 Depression Screening (PHQ-2/PHQ-9): Over the LAST 2 WEEKS            Advance Directives     Do you have a healthcare power of ?: Yes  Do you have a living will?: Yes     Cognitive Assessment     What day of the week is this?: Correct  What month is it?: Correct  What year is it?: Correct  Recall \"Ball\": Correct  Recall \"Flag\": Correct  Recall \"Tree\": Correct         PREVENTATIVE SERVICES    INDICATIONS AND SCHEDULE Internal Lab or Procedure   Diabetes Screening     HbgA1C   Annually HEMOGLOBIN A1c (% of total Hgb)   Date Value   02/20/2017 5.6     HgbA1C (%)   Date Value   03/09/2023 5.9 (H)       Fasting Blood Sugar (FSB)Annually Glucose (mg/dL)   Date Value   03/09/2023 107 (H)     GLUCOSE (mg/dL)   Date Value   03/07/2022 110 (H)      Cardiovascular Disease Screening    LDL Annually LDL Cholesterol (mg/dL)   Date Value   03/09/2023 116 (H)     LDL-CHOLESTEROL (mg/dL (calc))   Date Value   03/07/2022 120 (H)       EKG - w/ Initial Preventative Physical Exam only, or if medically necessary yes   Colorectal Cancer Screening     Colonoscopy Screen every 10 years Health Maintenance   Topic Date Due    Colorectal Cancer Screening  Discontinued       Flex Sigmoidoscopy Screen every 5 years No results found for this or any previous visit.    Fecal Occult Blood Annually Occult Blood Result (no units)   Date Value   04/24/2018 Positive for Occult Blood (A)      Glaucoma Screening     Ophthalmology Visit Annually adv   Immunizations     Zoster (Not covered by Medicare Part B) No orders found for this or any previous visit.     SPECIFIC DISEASE MONITORING Internal Lab or Procedure   No disease specific diagnoses       ALLERGIES:     Allergies   Allergen Reactions    Pcn [Penicillins] HIVES     MEDICAL INFORMATION:   Past Medical History:   Diagnosis Date    BPH (benign prostatic hyperplasia)     DDD (degenerative disc disease), cervical     DJD (degenerative joint disease), multiple sites     hips, lumbar, cervical    Essential hypertension     High blood pressure     High cholesterol     Hypertension     Incarcerated umbilical hernia 10/22/2020    Other and unspecified hyperlipidemia     Primary osteoarthritis of both knees 10/20/2015    PVD (peripheral vascular disease) (HCC)     Status post total left knee replacement 3/4/2019    Umbilical hernia     Varicose vein of leg 7/25/2016      Past Surgical  History:   Procedure Laterality Date    EYE SURGERY      left     LEG/ANKLE SURGERY PROC UNLISTED      left leg    REPAIR ING HERNIA,5+Y/O,REDUCIBL      TOTAL KNEE REPLACEMENT Left 02/26/2019    Dr Lopez      Family History   Problem Relation Age of Onset    Other (Other) Father         alz    Other (Other) Mother         alz    Cancer Brother      Immunization History      Immunization History  Administered            Date(s) Administered    Covid-19 Vaccine Stefany (J&J) 0.5ml                          05/31/2021      FLUZONE 6 months and older PFS 0.5 ml (63029)                          10/02/2017  09/01/2020      Pneumococcal (Prevnar 13)                          10/02/2017      Pneumovax 23          03/05/2022        SOCIAL HISTORY:   Social History     Socioeconomic History    Marital status:    Tobacco Use    Smoking status: Former     Types: Cigarettes, Cigars    Smokeless tobacco: Never    Tobacco comments:     hx of cigar smoking   Vaping Use    Vaping Use: Never used   Substance and Sexual Activity    Alcohol use: No    Drug use: No   Other Topics Concern    Caffeine Concern Yes     Comment: 3 cups of coffee daily    Exercise Yes     Comment: physical job, walks a lot daily.        REVIEW OF SYSTEMS:   General/Constitutional:   General able to do usual activities, good exercise tolerance, good general state of health, no fatigue, no fever, no weakness .   HEENT/Neck:   Head no dizziness, no lightheadedness. Eyes normal vision, no redness , no drainage. Ears no earaches, no fullness,   +decreased hearing, no tinnitus. Nose and Sinuses no recurrent colds, no stuffiness, no discharge, no hay fever, no nosebleeds, no sinus trouble. Mouth and Pharynx no sore throats, no hoarseness. Neck no lumps, no goiter, no neck stiffness or pain.   Endocrine:   Diabetes none. Thyroid disorder none.   Respiratory:   Patient denies chest pain, cough, PERDOMO (dyspnea on exertion),wheezing. Breathing normal pattern . Chest  congestion none.   Cardiovascular:   Patient denies chest pain, rheumatic fever,heart murmur. hx of elevated cholesterol/hypertension. Leg edema none. Orthopnea no. Palpitations none. PND (paroxsymal nocturnal dyspnea) none. Exercise -physical job   Gastrointestinal:   Patient denies  acid reflux, blood in stool, vomiting, nausea, heartburn no change in appetite noted no change in bowel movement noted. Dysphagia none.  Has gained weight poor eating habits  Hematology:   Patient denies abnormal bleeding, easy bruising. Enlarged lymph nodes none.   Men Only:   Patient denies penile discharge, testicular pain   Genitourinary:   Patient denies blood in urine, burning on urination,   , urinary frequency , urinary incontinence/no history of kidney disease or genital abnormalities. Dysuria none. Nocturia and difficulty urinating  Noted  scheduled to see urology Dr Boyer has a PSA lined up  Musculoskeletal:   Patient denies arthritis , back pain, muscle weakness . Joint pain complains of chronic right shoulder pain.  Joint stiffness none.  Complains of pain in both feet along with deformed feet  Peripheral Vascular:   General no varicosities, no claudication.   Dermatologic:   Rash none.  --nail fungus   Neurologic:   Patient denies dizziness, fainting, headache, loss of consciousness, memory loss, seizures, paresthesias, weakness. Tingling/numbness none. Trouble with balance none.   Psychiatric:   Patient denies anxiety, depression, hallucinations. Insomnia none/no hx of sleep disorder. Memory loss none.   EXAM:   /70 (BP Location: Left arm, Patient Position: Sitting, Cuff Size: adult)   Pulse 67   Temp 97.4 °F (36.3 °C) (Temporal)   Resp 16   Ht 5' 5\" (1.651 m)   Wt 174 lb (78.9 kg)   SpO2 97%   BMI 28.96 kg/m²    >   BP Readings from Last 3 Encounters:   02/22/24 140/70   07/20/23 130/60   01/19/23 130/60     GENERAL:   Build: average .   HEENT:   Ear canals: normal.     EOM: within normal limits.Pupils  SUHAS.Sclera and Conjunctiva normal.  Head: normocephalic.   Nasal septum: midline.   Nose: normal pink mucosa, no congestion, no swelling, no bleeding.   Oral cavity: normal, no lesions seen.   Turbinates: normal.   NECK:   Carotid bruit: none.   Cervical lymph nodes: unremarkable.   JVD: none.   Range of Motion: normal.   Thyroid: unremarkable.   HEART:   Clicks: no.   Edema: none visible .   Heart sounds: normal.   Murmurs: none.   Rhythm: regular.   LUNGS:   Auscultation: clear .   Chest Shape: normal .   Percussion: normal.   Rales: no .   Respiratory effort: normal .   Rhonchi: no.   Wheezes: no.   ABDOMEN:   Bowel sounds: normal.   General: normal.   Hernia: Scar of previous hernia surgery noted.  Diastases of recti noted  Liver, Spleen: no hepatosplenomegaly (HSM).   Tenderness: absent .   GENITOURINARY - MALE:   External genitals: unremarkable.   JESUS: normal .   Penis: unremarkable.   Prostate: per uro  Testicles: unremarkable.   EXTREMITIES:   Clubbing: none.   Cyanosis: absent .   Edema: none.   Pulses: present.   Tremors: no.   Varicose veins: RLE evident extensive  MUSCULOSKELETAL:   Cervical spines: normal.   L-S spines: normal.   Lower extremity joints: -hammer toes and bunions noted-s/p surgery on left knee   OA  also noted in the right knee as well as both hips on clinical examination   Upper extremity joints: Right shoulder movements are definitely restricted with significant crepitus noted during movement suggestive of degenerative joint disease  NEUROLOGICAL:   Cognitive function  Mood /affect -thought :perception :normal  Appearance-orientation normal  Thought normal   Babinski: negative..   Cerebellar Testing grossly/intact: yes..   Cranial Nerves: CN's II-XII grossly intact.   Gait: normal.   Motor: Power,tone,co-ordination normalInvoluntary movements and wasting none.   Reflexes: normal.   Sensory: all sensory modalities normal.   LYMPHATICS:   Cervical: none.   Groin: no adenopathy .    Inguinal: no adenopathy.   Supraclavicular: none.   DERMATOLOGY:   Rash: no. Dystrophic nails l fingers and toes --postinflammatory pigmentation noted on the anterior abdomen  ASSESSMENT AND OTHER RELEVANT CHRONIC CONDITIONS:   Mark Olivarez is a 78 year old male who presents for a Medicare Assessment.     PLAN SUMMARY:   Diagnoses and all orders for this visit:    Routine general medical examination at a health care facility  -     CBC With Differential With Platelet  -     Comp Metabolic Panel (14)  -     Hemoglobin A1C  -     Lipid Panel  -     PSA Total, Screen; Future  -     T4 (Thyroxine Total)  -     Assay, Thyroid Stim Hormone  -     Urinalysis, Routine  -     EKG with interpretation and Report -IN OFFICE [[id number]]    Essential hypertension stable    Pure hypercholesterolemia ending labs patient discontinued atorvastatin secondary to side effects    Elevated PSA did see urology in the past however never followed up.  -     Urology Referral - In Network     [facility] after all tests in 4 weeks  The patient indicates understanding of these issues and agrees to the plan.  The patient is asked to return in 6 months for medication check  Diet counseling perfomed    SUGGESTED VACCINATIONS - Influenza, Pneumococcal, Zoster, Tetanus   Influenza: No recommendations at this time  Pneumonia: No recommendations at this time  Shingrix shingles vaccine is due

## 2024-02-23 DIAGNOSIS — E87.6 HYPOKALEMIA: Primary | ICD-10-CM

## 2024-02-23 RX ORDER — POTASSIUM CHLORIDE 1500 MG/1
20 TABLET, EXTENDED RELEASE ORAL DAILY
Qty: 90 TABLET | Refills: 1 | Status: SHIPPED | OUTPATIENT
Start: 2024-02-23 | End: 2024-08-21

## 2024-03-02 ENCOUNTER — HOSPITAL ENCOUNTER (OUTPATIENT)
Dept: GENERAL RADIOLOGY | Age: 79
Discharge: HOME | End: 2024-03-02
Attending: INTERNAL MEDICINE
Payer: MEDICARE

## 2024-03-02 DIAGNOSIS — G89.29 CHRONIC RIGHT SHOULDER PAIN: ICD-10-CM

## 2024-03-02 DIAGNOSIS — M25.511 CHRONIC RIGHT SHOULDER PAIN: ICD-10-CM

## 2024-03-02 PROCEDURE — 73030 X-RAY EXAM OF SHOULDER: CPT | Performed by: INTERNAL MEDICINE

## 2024-03-04 ENCOUNTER — TELEPHONE (OUTPATIENT)
Dept: INTERNAL MEDICINE CLINIC | Facility: CLINIC | Age: 79
End: 2024-03-04

## 2024-03-04 NOTE — TELEPHONE ENCOUNTER
PSR's:   Spoke with wife, she said pt will call 3/5 to set up appt. INOI for when pt calls back to antonino, thanks!      ----- Message from Mike Buchanan MD sent at 3/4/2024 11:19 AM CST -----  Reviewed results   See me for follow-up.

## 2024-03-21 ENCOUNTER — OFFICE VISIT (OUTPATIENT)
Dept: SURGERY | Facility: CLINIC | Age: 79
End: 2024-03-21

## 2024-03-21 DIAGNOSIS — R82.90 URINE FINDING: Primary | ICD-10-CM

## 2024-03-21 DIAGNOSIS — R97.20 ELEVATED PSA: ICD-10-CM

## 2024-03-21 LAB
APPEARANCE: CLEAR
BILIRUBIN: NEGATIVE
GLUCOSE (URINE DIPSTICK): NEGATIVE MG/DL
KETONES (URINE DIPSTICK): NEGATIVE MG/DL
LEUKOCYTES: NEGATIVE
MULTISTIX LOT#: NORMAL NUMERIC
NITRITE, URINE: NEGATIVE
OCCULT BLOOD: NEGATIVE
PH, URINE: 6.5 (ref 4.5–8)
PROTEIN (URINE DIPSTICK): NEGATIVE MG/DL
SPECIFIC GRAVITY: 1.01 (ref 1–1.03)
URINE-COLOR: YELLOW
UROBILINOGEN,SEMI-QN: 0.2 MG/DL (ref 0–1.9)

## 2024-03-21 PROCEDURE — 1160F RVW MEDS BY RX/DR IN RCRD: CPT | Performed by: UROLOGY

## 2024-03-21 PROCEDURE — 1159F MED LIST DOCD IN RCRD: CPT | Performed by: UROLOGY

## 2024-03-21 PROCEDURE — 81003 URINALYSIS AUTO W/O SCOPE: CPT | Performed by: UROLOGY

## 2024-03-21 PROCEDURE — 99204 OFFICE O/P NEW MOD 45 MIN: CPT | Performed by: UROLOGY

## 2024-03-21 RX ORDER — TAMSULOSIN HYDROCHLORIDE 0.4 MG/1
0.4 CAPSULE ORAL EVERY EVENING
Qty: 90 CAPSULE | Refills: 6 | Status: SHIPPED | OUTPATIENT
Start: 2024-03-21

## 2024-03-21 NOTE — PROGRESS NOTES
Urology Clinic Note - New Patient    Referring Provider:  Mike Buchanan MD  [facility], [address] [phone]     Primary Care Provider:  Mike Buchanan MD     Chief Complaint:   Elev psa, luts     HPI:     Mark Olivarez is a 78 year old male with history of HTN, HLD, PVD referred for LUTS, elev psa.    Patient previously saw Dr. Bridges about 3 years ago; history of elevated PSA and BPH.   PVR was 60cc. At that time PSA had been stable for several years and observation was recommended. He apparently has not had a biopsy before.     Not on any prostate medications.   Today, presents with ongoing PSA rise; now 13.50 from 10 last year and 9 in 2021.   CT in 2021; 80-90gm prostate.   PSA D about ~0.15.     He has mild LUTS; AUA SS is 7. PVR 7cc and UA negative.       PSA:  Lab Results   Component Value Date    PSA 10.40 (H) 03/09/2023    PSA 9.96 (H) 07/01/2021    PSA 6.84 (H) 09/02/2020    PSA 8.11 (H) 02/07/2020    PSA 8.51 (H) 01/31/2019    PSAS 13.50 (H) 02/22/2024    PSAS 5.05 (H) 10/06/2017    QPSA 3.7 02/20/2017    QPSA 4.5 (H) 07/15/2016    QPSA 3.3 07/09/2015      Last Cr was 1.16 done on 2/22/2024.      History:     Past Medical History:   Diagnosis Date    BPH (benign prostatic hyperplasia)     DDD (degenerative disc disease), cervical     DJD (degenerative joint disease), multiple sites     hips, lumbar, cervical    Essential hypertension     High blood pressure     High cholesterol     Hypertension     Incarcerated umbilical hernia 10/22/2020    Other and unspecified hyperlipidemia     Primary osteoarthritis of both knees 10/20/2015    PVD (peripheral vascular disease) (HCC)     Status post total left knee replacement 3/4/2019    Umbilical hernia     Varicose vein of leg 7/25/2016       Past Surgical History:   Procedure Laterality Date    EYE SURGERY      left     LEG/ANKLE SURGERY PROC UNLISTED      left leg    REPAIR ING HERNIA,5+Y/O,REDUCIBL      TOTAL KNEE REPLACEMENT Left  02/26/2019    Dr Lopez       Family History   Problem Relation Age of Onset    Other (Other) Father         alz    Other (Other) Mother         alz    Cancer Brother        Social History     Socioeconomic History    Marital status:    Tobacco Use    Smoking status: Former     Types: Cigarettes, Cigars    Smokeless tobacco: Never    Tobacco comments:     hx of cigar smoking   Vaping Use    Vaping Use: Never used   Substance and Sexual Activity    Alcohol use: No    Drug use: No   Other Topics Concern    Caffeine Concern Yes     Comment: 3 cups of coffee daily    Exercise Yes     Comment: physical job, walks a lot daily.       Medications (Active prior to today's visit):  Current Outpatient Medications   Medication Sig Dispense Refill    Potassium Chloride ER 20 MEQ Oral Tab CR Take 20 mEq by mouth daily. 90 tablet 1    LOSARTAN POTASSIUM-HCTZ 100-12.5 MG Oral Tab Take 1 tablet by mouth once daily 90 tablet 0    AMLODIPINE 5 MG Oral Tab Take 1 tablet by mouth once daily 90 tablet 0    aspirin 81 MG Oral Tab Take 1 tablet (81 mg total) by mouth daily.         Allergies:  Allergies   Allergen Reactions    Pcn [Penicillins] HIVES         Review of Systems:   A comprehensive 10-point review of systems was completed.  Pertinent positives and negatives are noted in the the HPI.    Physical Exam:   CONSTITUTIONAL: Well developed, well nourished, in no acute distress  NEUROLOGIC: Alert and oriented  HEAD: Normocephalic, atraumatic  ENT: Hearing intact   RESPIRATORY: Normal respiratory effort  SKIN: No evident rashes  ABDOMEN: Soft, non-tender, non-distended,   DIGITAL RECTAL EXAM: ~60gm prostate, anodular    XR SHOULDER, COMPLETE (MIN 2 VIEWS), RIGHT (CPT=73030)    Result Date: 3/2/2024  CONCLUSION:  1. Mild arthropathy without significant change dating back to 5/3/2018 radiographs.   LOCATION:  [place]   Dictated by (CST): Ann Steen MD on 3/02/2024 at 10:44 AM     Finalized by (CST): Ann Steen MD on 3/02/2024 at  10:45 AM          Assessment & Plan:     Mark Olivarez is a 78 year old male with history of HTN, HLD, PVD referred for LUTS, elev psa.    # LUTS  Behavior modification; discussed large prostate on previous imaging and JESUS; Symptoms are mild but we discussed flomax and he would like to trial; s/e discussed      # Elev Psa    I had a long discussion with the patient regarding his elevated PSA level. I explained that PSA (prostate-specific antigen) is a protein that is secreted by prostate cells into the blood stream.  I reviewed the various causes of PSA elevation, both benign and malignant, including prostate cancer, benign prostatic hyperplasia, prostatitis, recent urinary tract instrumentation, urinary infections, and urinary retention.  In addition, a number of factors can alter the PSA level over time, including age, medications (such as finasteride), diet, herbal supplements, and of course surgical procedures on the prostate itself.    With an elevated PSA level, it is important to rule out prostate cancer as a potential cause.  The only way to reliably do this is with a biopsy of the prostate.  I explained that this is done under ultrasound guidance using a rectal probe.  This allows excellent visualization of the prostate, measurements of the size of the gland, as well as accurate placement of the biospy needle.  I emphasized that, although prostate biopsies are good at detecting prostate cancer, it is not 100%.  It is subject to sampling error, and there is a possibility of actually missing the area of the prostate with the cancer.  I assured the patient that we try to sample the areas that are most likely going to be involved with the cancer (usually at the periphery of the gland).  In cases of a repeat biopsy, we will also sample other areas, such as the transition zone or central areas of the prostate for a more complete assessment.     I also discussed the rationale for performing a multiparametric  MRI of the prostate in order to better visualize the gland.  Any suspicious areas noted on the MRI can then be targeted during the biopsy procedure by means of the Uronav MR-US-fusion guided prostate biopsy.  This may improve cancer detection rates and may allow for better prognostication.          Long history; recently more elevated. PSAD borderline. Discussed MRI and then possible biopsy vs observation. He would like to proceed.   Has knee replacement, I discussed with MRI tech; safe for scanner     Will return in a few weeks via telephone call to discuss results.       Thank you for this consult.    I have personally reviewed all relevant medical records, labs, and imaging.            Wally Solorzano MD  Staff Urologist  [facility]  Office: [phone]

## 2024-05-06 RX ORDER — AMLODIPINE BESYLATE 5 MG/1
5 TABLET ORAL DAILY
Qty: 90 TABLET | Refills: 0 | Status: SHIPPED | OUTPATIENT
Start: 2024-05-06

## 2024-05-06 NOTE — TELEPHONE ENCOUNTER
Name from pharmacy: amLODIPine Besylate 5 MG Oral Tablet         Will file in chart as: AMLODIPINE 5 MG Oral Tab    Sig: Take 1 tablet by mouth once daily    Disp: 90 tablet    Refills: 0    Start: 5/4/2024    Class: Normal    Non-formulary    Last ordered: 2 months ago (2/8/2024) by Mike Buchanan MD    Last refill: 7/20/2023    Rx #: [id number]    Hypertension Medications Protocol Tkziht1905/04/2024 12:17 PM   Protocol Details Last BP reading less than 140/90    CMP or BMP in past 12 months    In person appointment or virtual visit in the past 12 mos or appointment in next 3 mos    EGFRCR or GFRNAA > 50      To be filled at: [address] [phone], [phone]

## 2024-05-18 DIAGNOSIS — I10 ESSENTIAL HYPERTENSION: ICD-10-CM

## 2024-05-20 RX ORDER — LOSARTAN POTASSIUM AND HYDROCHLOROTHIAZIDE 12.5; 1 MG/1; MG/1
1 TABLET ORAL
Qty: 90 TABLET | Refills: 0 | Status: SHIPPED | OUTPATIENT
Start: 2024-05-20

## 2024-05-20 NOTE — TELEPHONE ENCOUNTER
Name from pharmacy: Losartan Potassium-HCTZ 100-12.5 MG Oral Tablet         Will file in chart as: LOSARTAN POTASSIUM-HCTZ 100-12.5 MG Oral Tab    Sig: Take 1 tablet by mouth once daily    Disp: 90 tablet    Refills: 0    Start: 5/18/2024    Class: Normal    Non-formulary For: Essential hypertension    Last ordered: 3 months ago (2/12/2024) by Mike Buchanan MD    Last refill: 2/12/2024    Rx #: [id number]    Hypertension Medications Protocol [id number]/18/2024 11:53 AM   Protocol Details Last BP reading less than 140/90    CMP or BMP in past 12 months    In person appointment or virtual visit in the past 12 mos or appointment in next 3 mos    EGFRCR or GFRNAA > 50      To be filled at: [facility] [address] [place] [address] [phone], [phone]

## 2024-05-21 ENCOUNTER — HOSPITAL ENCOUNTER (OUTPATIENT)
Dept: MRI IMAGING | Facility: HOSPITAL | Age: 79
Discharge: HOME | End: 2024-05-21
Attending: UROLOGY

## 2024-05-21 DIAGNOSIS — R97.20 ELEVATED PSA: ICD-10-CM

## 2024-05-21 PROCEDURE — 72197 MRI PELVIS W/O & W/DYE: CPT | Performed by: UROLOGY

## 2024-05-21 PROCEDURE — A9575 INJ GADOTERATE MEGLUMI 0.1ML: HCPCS | Performed by: UROLOGY

## 2024-05-21 RX ORDER — GADOTERATE MEGLUMINE 376.9 MG/ML
20 INJECTION INTRAVENOUS
Status: COMPLETED | OUTPATIENT
Start: 2024-05-21 | End: 2024-05-21

## 2024-05-21 RX ADMIN — GADOTERATE MEGLUMINE 16 ML: 376.9 INJECTION INTRAVENOUS at 14:07:00

## 2024-05-23 ENCOUNTER — TELEPHONE (OUTPATIENT)
Dept: SURGERY | Facility: CLINIC | Age: 79
End: 2024-05-23

## 2024-05-23 RX ORDER — CIPROFLOXACIN 500 MG/1
500 TABLET, FILM COATED ORAL 2 TIMES DAILY
Qty: 6 TABLET | Refills: 0 | Status: SHIPPED | OUTPATIENT
Start: 2024-05-23 | End: 2024-05-26

## 2024-05-23 RX ORDER — SULFAMETHOXAZOLE AND TRIMETHOPRIM 800; 160 MG/1; MG/1
1 TABLET ORAL 2 TIMES DAILY
Qty: 6 TABLET | Refills: 0 | Status: SHIPPED | OUTPATIENT
Start: 2024-05-23 | End: 2024-05-26

## 2024-05-23 NOTE — TELEPHONE ENCOUNTER
Called patient regarding MRI results. Recommend proceeding with URONAV biopsy and he would like to proceed.       The risks, benefits, and some of the possible complications of ultrasound guided prostate biopsy with prostate block were discussed with the patient at length and in detail.  The possible need for postoperative treatments including further surgical procedures was discussed with the patient.        The general risks of the operative procedure and the perioperative period were discussed with the patient at length and in detail including swelling, pain, nausea, vomiting, fever, chills, infection, wound infection, sepsis, , internal or external bleeding, and others.        All of the patient's questions were answered and he voiced an understanding of these risks, benefits and possible complications.     He was given instruction on the preprocedural protocol.  Antibiotics sent.   Discussed holding baby aspirin and note sent to PCP as well.         Hi,    Can you please schedule this patient for MRI fusion biopsy.     Can you have him hold his asa81 for 7 days prior to the procedure?    Thanks,  Wally    Urology Surgery Request  Surgeon: Wally Solorzano  Location (if known): [place]  Procedure: MRI fusion prostate biopsy  Anesthesia: Local  Time Frame: Next available   Time required: 30 minutes  Diagnosis: Elevated PSA  Special Equipment: MRI fusion machine    Antibiotics: per hospital protocol unless checked below   ___ Levaquin 500 mg IV   ___ Gemcitabine 2 g/100 mL NS bladder instillation to be given in OR    Estimated Post Op/Follow Up Appt:   1 week for 30 minutes postbiopsy discussion (booking at noon is OK). Please schedule appointment at time of surgery scheduling.

## 2024-05-23 NOTE — PROGRESS NOTES
From Dr. Buchanan;     Good morning Dr. Solorzano.  Yes it is good for him to hold the aspirin  DR LORENA SUTHERLAND to hold asa81

## 2024-06-05 ENCOUNTER — TELEPHONE (OUTPATIENT)
Dept: SURGERY | Facility: CLINIC | Age: 79
End: 2024-06-05

## 2024-06-05 NOTE — TELEPHONE ENCOUNTER
This encounter is now closed.     RN called patient. He has questions about his BP meds, if he can take them.   RN discuss prostate biopsy medications and BP meds.   He agreed to plans and verbalized understanding.

## 2024-06-05 NOTE — TELEPHONE ENCOUNTER
Patient is scheduled for Fusion Biopsy on 06/26/24.  Patient would like to know if he can take his Blood pressure medications.  Please contact to discuss.

## 2024-06-14 ENCOUNTER — TELEPHONE (OUTPATIENT)
Dept: SURGERY | Facility: CLINIC | Age: 79
End: 2024-06-14

## 2024-06-14 NOTE — TELEPHONE ENCOUNTER
Patient's wife states they were supposed to receive instructions for 6/26 prostate biopsy and they have not received them. Please call.

## 2024-06-21 NOTE — DISCHARGE INSTRUCTIONS
Performed by Dr. LYNCH    1. Hematuria (blood in urine) and/or blood in your bowel movement    Usually minimal-lasting 24 hours is expected  Hydrating with additional fluids such as water an juices is necessary to minimize hematuria.  Blood may also be present in stool after bowel movement. Avoid straining during elimination, avoid constipating food items.  Call ordering doctor if bleeding persists or worsens, such as a darker in appearance or thicker in consistency.    2. Activity  Rest quietly for the next 24 hours. Avoid straining, lifting heavy items, or strenuous physical activity for approximately 2 days.    3. Infection  Continue with antibiotics as ordered. If further signs or symptoms of infection occur such as:  fever (temp greater than 100) or severe discomfort persist, call the ordering doctor. Use tylenol for discomfort. Avoid aspirin, NSAIDs, and Ibuprofen products for 48 hours.    4.  Diet  No dietary restrictions except food products that cause constipation.Hydrate well.    5.  Results  Call/follow-up with ordering doctor for results, usually 5-7 days.    6.  Follow-up  The Radiology Department RN's are available to answer questions if you are unable to contact the ordering doctor. Call the [facility] Radiology Department at [phone] Monday through Friday 8AM - 4PM and ask to speak to a nurse .

## 2024-06-26 ENCOUNTER — HOSPITAL ENCOUNTER (OUTPATIENT)
Dept: ULTRASOUND IMAGING | Facility: HOSPITAL | Age: 79
Discharge: HOME | End: 2024-06-26
Attending: UROLOGY

## 2024-06-26 VITALS — HEART RATE: 90 BPM | SYSTOLIC BLOOD PRESSURE: 135 MMHG | DIASTOLIC BLOOD PRESSURE: 50 MMHG

## 2024-06-26 DIAGNOSIS — R97.20 ELEVATED PSA: ICD-10-CM

## 2024-06-26 PROCEDURE — 76942 ECHO GUIDE FOR BIOPSY: CPT | Performed by: UROLOGY

## 2024-06-26 PROCEDURE — 55700 BIOPSY OF PROSTATE,NEEDLE/PUNCH: CPT | Performed by: UROLOGY

## 2024-06-26 NOTE — IMAGING NOTE
1420 PATIENT ARRIVAL TIME:    HISTORY TAKEN ALG PNC, STATES NO ASA/NSAID X 7 DAYS, TOOK BP MED IN AM.   BASE LINE VITAL SIGNS: /50 HR 90    ANTIBIOTICS TAKEN: Y  GENTAMICIN GIVEN IM NOT ORDERED  FLEETS ENEMA TAKEN Y    PROCEDURE EXPLAINED, Q&A R/B-PT V/U AND  CONSENT OBTAINED    ULTRASOUND TECH:  JESS     PHYSICIAN TO PERFORM PROCEDURE: SYED    1452 PHYSICIAN ARRIVAL TIME    1454 TIME OUT COMPLETED     1457 LIDOCAINE INSTILLED UTILIZING 22 G-20 CM CHIBA NEEDLE     1502   IMAGES/SCANS CORRELATED - SAMPLING BEGUN   CORE SAMPLES WITH 18 G - 25 CM BARD BIOPSY NEEDLE:    PROSTATE SITES:   REGION OF INTEREST (KATE),  CORES X     RIGHT BASE/ MID/ /APEX,  CORES X   EACH SITE    LEFT BASE/ MID /APEX , CORES X   EACH SITE    SAMPLES APPLIED TO TELFA PRIOR TO IMMERSING INTO FORMALIN 10% SOLUTION    1508 SAMPLING COMPLETED:    POST PROCEDURE VITAL SIGNS: /54 HR 73, NO PT C/O. DENIES D/L. NO ABN BLEEDING.     1515 UP TO BR, STEADY GAIT, SELF EZIO CARE.     1520 PATIENT DISCHARGES-AVS INSTRUCTIONS PROVIDED    1520 SPECIMENS TO LAB/CYTOLOGY/GROSS ROOM

## 2024-06-26 NOTE — PROGRESS NOTES
Clinic Procedure Note    INDICATIONS:     Mark Olivarez is a 79 year old male with history of HTN, HLD, PVD referred for LUTS, elev psa.     Patient previously saw Dr. Bridges about 3 years ago; history of elevated PSA and BPH. PVR was 60cc. At that time PSA had been stable for several years and observation was recommended. He apparently has not had a biopsy before.      Not on any prostate medications.   He then saw me for ongoing PSA rise; now 13.50 from 10 last year and 9 in .   CT in ; 80-90gm prostate.   PSA D about ~0.15.      MR 24;    Impression   CONCLUSION:  There is a small, 5 x 6 mm nodule within the left peripheral zone posteriorly at the apex with diffusion restriction and early arterial phase enhancement, consistent with a PI rads 4 lesion.          He has mild LUTS; AUA SS is 7. PVR 7cc and UA negative.   Here now for biopsy.       Asa81- holding per PCP.     PSA:  Lab Results   Component Value Date    PSA 10.40 (H) 2023    PSA 9.96 (H) 2021    PSA 6.84 (H) 2020    PSA 8.11 (H) 2020    PSA 8.51 (H) 2019    PSAS 13.50 (H) 2024    PSAS 5.05 (H) 10/06/2017    QPSA 3.7 2017    QPSA 4.5 (H) 07/15/2016    QPSA 3.3 2015        PROCEDURE:    1. Transrectal ultrasound of the prostate with UroNav MRI Fusion    2. Periprostatic nerve block, bilateral    3. Ultrasound-guided needle placement    4. Prostate needle biopsy    DATE OF PROCEDURE: 2024     PRE-PROCEDURE DIAGNOSIS: Elevated PSA    POST-PROCEDURE DIAGNOSIS: Same     SURGEON: Wally Solorzano MD    FINDINGS:    Digital Rectal Exam: ~60g prostate, no nodules or tenderness    Prostate Ultrasound: 55mL prostate volume    SPECIMEN: Prostate biopsies (12 standard biopsies + 4 biopsies of the MRI region of interest)    PROCEDURE:   Patient was brought to the procedure suite and a time-out was performed confirming the patient, , and procedure to be performed. The risks and benefits of the  procedure were once again described to patient including bleeding (hematospermia, hematochezia, and hematuria), infection (including severe sepsis), temporary erectile dysfunction, and pelvic pain. The patient agreed to proceed and informed consent form was signed. He took 3 doses of Cefdinir and Ciprofloxacin since yesterday, including 2 hours before this procedure, and will take 3 additional doses of each antibiotic after the procedure.    He was placed on his side with knees towards his chest. A digital rectal examination was performed, with findings as above. The well-lubricated ultrasound probe was inserted into the rectum, and the prostate was visualized and measured (as above). The MRI images were fused with the ultrasound using the Podclassv technology. Then, 5 mL of 1% lidocaine without epinephrine was injected into the periprostatic nerve bundles on each side (10 mL total) using ultrasound guidance.      Using ultrasound guidance, core biopsies were taken of the MRI region(s) of interests, and then were taken from the lateral and medial aspects of the base, middle and apex of each lobe. These were labeled and sent to pathology for analysis.    The probe was removed from the rectum and a finger was placed in the rectum to hold pressure on the prostate for several minutes. There was no active bleeding after removal of the finger. There were no complications and the patient tolerated the biopsy without issue.    PLAN:   He will follow up in 1 week to review his pathology (or I will call him if it results sooner) and determine the best management course. Post-procedure instructions were given and he knows to go to the ED if he experiences fevers/chills, has significant bleeding, or is unable to urinate.          Wally Solorzano MD  Staff Urologist  [facility]  Office: [phone]

## 2024-06-30 DIAGNOSIS — C61 PROSTATE CANCER (HCC): Primary | ICD-10-CM

## 2024-07-10 ENCOUNTER — TELEPHONE (OUTPATIENT)
Dept: SURGERY | Facility: CLINIC | Age: 79
End: 2024-07-10

## 2024-07-10 ENCOUNTER — TELEPHONE (OUTPATIENT)
Dept: ADMINISTRATIVE | Age: 79
End: 2024-07-10

## 2024-07-10 NOTE — TELEPHONE ENCOUNTER
Status Of Auth is PENDING  Patient should reschedule.   Attempted to reach out to patient by phone and/or Mychart.  Insurance will not cover without approval.        [employer] to initiate authorization    PET PSMA FOR PROSTATE CARCINOMA (CPT=78815)     Referral #: [id number]      Scheduled For: 07/11/2024    Status: pending authorization     Case Number: [id number]      Clinical notes sent for review.     Patient notified of pending status    Appt Desk > Noted    Notified NM/PET

## 2024-07-10 NOTE — TELEPHONE ENCOUNTER
Patient wife calling to confirm if doctor would still like to see patient at upcoming appointment on the 15th without PET scan being able to be done until 07.25.24. Please advise.

## 2024-07-11 NOTE — TELEPHONE ENCOUNTER
Spoke with patient and will keep appt for 7/15/24 to discuss biopsy.      Tried to call wife, mail box full and unable to leave message.

## 2024-07-15 ENCOUNTER — OFFICE VISIT (OUTPATIENT)
Dept: SURGERY | Facility: CLINIC | Age: 79
End: 2024-07-15

## 2024-07-15 DIAGNOSIS — C61 PROSTATE CANCER (HCC): Primary | ICD-10-CM

## 2024-07-15 PROCEDURE — 99215 OFFICE O/P EST HI 40 MIN: CPT | Performed by: UROLOGY

## 2024-07-15 PROCEDURE — 1160F RVW MEDS BY RX/DR IN RCRD: CPT | Performed by: UROLOGY

## 2024-07-15 PROCEDURE — 1159F MED LIST DOCD IN RCRD: CPT | Performed by: UROLOGY

## 2024-07-15 NOTE — PROGRESS NOTES
Urology Clinic Note    Primary Care Provider:  Mike Buchanan MD     Chief Complaint:   Prostate cancer     HPI:     Mark Olivarez is a 79 year old male with history of HTN, HLD, PVD referred for LUTS, elev psa.  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      Patient previously saw Dr. Bridges about 3 years ago; history of elevated PSA and BPH. PVR was 60cc. At that time PSA had been stable for several years and observation was recommended. He apparently has not had a biopsy before.      Not on any prostate medications.   He then saw me for ongoing PSA rise; now 13.50 from 10 last year and 9 in 2021.   CT in 2021; 80-90gm prostate.   PSA D about ~0.15.      MR 5/21/24;    Impression   CONCLUSION:  There is a small, 5 x 6 mm nodule within the left peripheral zone posteriorly at the apex with diffusion restriction and early arterial phase enhancement, consistent with a PI rads 4 lesion.            He has mild LUTS; AUA SS is 7. PVR 7cc and UA negative.     Biopsy was done on 6/26;   Target; gl 4+3   RA; 3+3  LB, LM, LA; 4+3    PET Scan pending. Here to discuss.   Doing well after biopsy.  No signs or symptoms of infection.        PSA:  Lab Results   Component Value Date    PSA 10.40 (H) 03/09/2023    PSA 9.96 (H) 07/01/2021    PSA 6.84 (H) 09/02/2020    PSA 8.11 (H) 02/07/2020    PSA 8.51 (H) 01/31/2019    PSAS 13.50 (H) 02/22/2024    PSAS 5.05 (H) 10/06/2017    QPSA 3.7 02/20/2017    QPSA 4.5 (H) 07/15/2016    QPSA 3.3 07/09/2015        History:     Past Medical History:    BPH (benign prostatic hyperplasia)    DDD (degenerative disc disease), cervical    DJD (degenerative joint disease), multiple sites    hips, lumbar, cervical    Essential hypertension    High blood pressure    High cholesterol    Hypertension    Incarcerated umbilical hernia    Other and unspecified hyperlipidemia    Primary osteoarthritis of both knees    PVD (peripheral  vascular disease) (HCC)    Status post total left knee replacement    Umbilical hernia    Varicose vein of leg       Past Surgical History:   Procedure Laterality Date    Eye surgery      left     Leg/ankle surgery proc unlisted      left leg    Repair ing hernia,5+y/o,reducibl      Total knee replacement Left 02/26/2019    Dr Lopez       Family History   Problem Relation Age of Onset    Other (Other) Father         alz    Other (Other) Mother         alz    Cancer Brother        Social History     Socioeconomic History    Marital status:    Tobacco Use    Smoking status: Former     Types: Cigarettes, Cigars    Smokeless tobacco: Never    Tobacco comments:     hx of cigar smoking   Vaping Use    Vaping status: Never Used   Substance and Sexual Activity    Alcohol use: No    Drug use: No   Other Topics Concern    Caffeine Concern Yes     Comment: 3 cups of coffee daily    Exercise Yes     Comment: physical job, walks a lot daily.       Medications (Active prior to today's visit):  Current Outpatient Medications   Medication Sig Dispense Refill    LOSARTAN POTASSIUM-HCTZ 100-12.5 MG Oral Tab Take 1 tablet by mouth once daily 90 tablet 0    amLODIPine 5 MG Oral Tab Take 1 tablet (5 mg total) by mouth daily. 90 tablet 0    tamsulosin 0.4 MG Oral Cap Take 1 capsule (0.4 mg total) by mouth every evening. 90 capsule 6    Potassium Chloride ER 20 MEQ Oral Tab CR Take 20 mEq by mouth daily. 90 tablet 1    aspirin 81 MG Oral Tab Take 1 tablet (81 mg total) by mouth daily.         Allergies:  Allergies   Allergen Reactions    Pcn [Penicillins] HIVES       Review of Systems:   A comprehensive 10-point review of systems was completed.  Pertinent positives and negatives are noted in the the HPI.    Physical Exam:   CONSTITUTIONAL: Well developed, well nourished, in no acute distress  NEUROLOGIC: Alert and oriented  HEAD: Normocephalic, atraumatic  EYES: Sclera non-icteric  SKIN: No evident rashes  ABDOMEN: Soft,  non-tender, non-distended       Assessment & Plan:     Mark Olivarez is a 79 year old male with history of HTN, HLD, PVD referred for LUTS, elev psa.  Now found to have NCCN unfavorable intermediate risk prostate cancer (i PSA 13.5, [place] 4+3, clinical stage T1c).     I had a long discussion with the patient regarding prostate cancer diagnosis. I discussed natural history of well-differentiated prostate cancer with patient. I informed him that his PSA and biopsy results place him in the unfavorable intermediate risk group, per the NCCN guidelines.    I discussed that the guidelines recommend bone and soft tissue imaging when in this risk category.  This can be done with either a PSMA PET scan or a bone scan + CT scan or MRI.    I discussed management options for patient with >10 year life expectancy, including radical prostatectomy with pelvic lymphadenectomy (open vs. robotic), external beam radiation (EBRT) + androgen deprivation therapy (ADT), and EBRT + ADT + brachytherapy.    We first reviewed surgical management via robotic prostatectomy with bilateral pelvic lymphadenectomy. I described the procedure and discussed the risks of surgery including, but not limited to, bleeding, infection, injury to surrounding structures, delayed bleeding, lymphocele, hernia, pain, urethral stricture, incontinence, erectile dysfunction, cancer persistence, cancer recurrence, need for conversion to open procedure, and need for additional procedures.    We then discussed radiation options including external beam radiation therapy and brachytherapy, plus androgen deprivation therapy.  I explained he would need a referral to a Radiation Oncologist to further discuss this option.  I discussed side effects of radiation which include, but are not limited to, urinary incontinence, urethral strictures, erectile dysfunction, radiation colitis and radiation cystitis. I explained these complications may appear months to years after  therapy. I also discussed irritative voiding symptoms that could occur with radiation therapy such as urinary urge incontinence, frequency, and dysuria.    I answered his questions regarding each treatment option. I referred the patient to the NCCN Patient Resources website for additional information.    -- FU PET scan next week   -- Leaning towards radiation- referral placed      In total, 40 minutes were spent on this patient encounter (including chart review, patient history, physical, and counseling, documentation, and communication).    Wally Solorzano MD  Staff Urologist  [facility]  Office: [phone]

## 2024-07-17 ENCOUNTER — TELEPHONE (OUTPATIENT)
Dept: RADIATION ONCOLOGY | Facility: HOSPITAL | Age: 79
End: 2024-07-17

## 2024-07-17 NOTE — TELEPHONE ENCOUNTER
New consult for prostate cancer. Dr. Solorzano referring patient to Dr. Ordoñez. Patient's wife can be reached at [phone]. Called 7/17/24 KM

## 2024-07-25 ENCOUNTER — HOSPITAL ENCOUNTER (OUTPATIENT)
Dept: NUCLEAR MEDICINE | Facility: HOSPITAL | Age: 79
Discharge: HOME | End: 2024-07-25
Attending: UROLOGY
Payer: MEDICARE

## 2024-07-25 ENCOUNTER — TELEPHONE (OUTPATIENT)
Dept: SURGERY | Facility: CLINIC | Age: 79
End: 2024-07-25

## 2024-07-25 DIAGNOSIS — C61 PROSTATE CANCER (HCC): ICD-10-CM

## 2024-07-25 PROCEDURE — 78815 PET IMAGE W/CT SKULL-THIGH: CPT | Performed by: UROLOGY

## 2024-07-25 NOTE — TELEPHONE ENCOUNTER
Spoke with patient and informed him of result and recommendation from Dr Solorzano.  Has appt with Dr Bennett on 8/6/24.      ----- Message from Wally Solorzano sent at 7/25/2024  3:55 PM CDT -----  Please let patient know cancer has not spread outside of the prostate but does involve the seminal vesicle along the prostate- this does not change the plan of him having radiation. He should see Dr. Bennett as scheduled.   Thanks

## 2024-08-05 NOTE — PROGRESS NOTES
Nursing Consultation Note  Patient: Mark Olivarez  YOB: 1945  Age: 79 year old  Radiation Oncologist: Dr. Abdi Bennett  Referring Physician: Wally Solorzano  Diagnosis:[unfilled]  Consult Date: 8/5/2024      Chemotherapy: n/a  Labs: Reviewed  Imaging: Reviewed  Is the patient of child-bearing age?         No  Has the patient received radiation therapy in the past? no  Does the patient have an implantable device?No   Patient has/has had:     1. Assistive Devices: N/A    2. Flu Vaccination: no-referral to ask PCP    3. Pneumonia Vaccination:  no--referral to ask PCP    Vital Signs:   Vitals:    08/06/24 0819   BP: 152/79   Pulse: 66   Resp: 18   Temp: 97.7 °F (36.5 °C)   ,   Wt Readings from Last 6 Encounters:   08/06/24 78 kg (172 lb)   02/22/24 78.9 kg (174 lb)   07/20/23 78.7 kg (173 lb 6.4 oz)   01/19/23 82.7 kg (182 lb 6.4 oz)   06/15/22 81.5 kg (179 lb 9.6 oz)   04/11/22 82.6 kg (182 lb 3.2 oz)       Nursing Note: Hx of elevated PSA. PSA 13.5 on 2/22/24. Had MRI of prostate 5/21/24. Prostate biopsy done 6/26/24- path adenocarcinoma of prostate GGS 4+3= 7, grade group 3. PET done 7/25/24- no mets. Uptake extending toward the left seminal vesicle. Here for consult today. Has urinary frequency. Denies urgency, incontinence, dysuria, or hematuria. Denies constipation or diarrhea. Denies blood in stool. AUA 6                    Location Upperglade Score % of Pattern 4  Grade Group   Positive Cores / Total Cores  Tumor Length (mm)  % Tissue Involved       A. Region of interest 4 + 3 = 7 75% 3 3/4 11 mm 70%    B. Right base NA              C. Right mid NA              D. Right apex 3 + 3 = 6 NA 1 1/2 2 mm 10%    E. Left base 4 + 3 = 7 80% 3 2/2 10 mm 70%    F. Left mid 4 + 3 = 7 70% 3 2/2 6.5 mm 40%    G. Left apex 4 + 3 = 7 65% 3 1/2 3.5 mm 15%        PSA:    Lab Results   Component Value Date    PSA 10.40 (H) 03/09/2023    PSA 9.96 (H) 07/01/2021    PSA 6.84 (H) 09/02/2020    PSA 8.11 (H) 02/07/2020     PSA 8.51 (H) 01/31/2019       PSA Screen:    Lab Results   Component Value Date    PSAS 13.50 (H) 02/22/2024    PSAS 5.05 (H) 10/06/2017       Review of Systems   Constitutional: Negative.    HENT: Negative.     Eyes: Negative.    Respiratory: Negative.     Cardiovascular:  Positive for leg swelling.   Gastrointestinal:  Positive for abdominal pain.   Endocrine: Negative.    Genitourinary:  Positive for frequency.   Musculoskeletal:  Positive for arthralgias.   Skin: Negative.    Allergic/Immunologic: Negative.    Neurological: Negative.    Hematological: Negative.    Psychiatric/Behavioral: Negative.           Allergies:  Allergies   Allergen Reactions    Ciprofloxacin HYPOTENSION, OTHER (SEE COMMENTS) and NAUSEA ONLY    Pcn [Penicillins] HIVES       Current Outpatient Medications   Medication Sig Dispense Refill    LOSARTAN POTASSIUM-HCTZ 100-12.5 MG Oral Tab Take 1 tablet by mouth once daily 90 tablet 0    amLODIPine 5 MG Oral Tab Take 1 tablet (5 mg total) by mouth daily. 90 tablet 0    tamsulosin 0.4 MG Oral Cap Take 1 capsule (0.4 mg total) by mouth every evening. 90 capsule 6    Potassium Chloride ER 20 MEQ Oral Tab CR Take 20 mEq by mouth daily. 90 tablet 1    aspirin 81 MG Oral Tab Take 1 tablet (81 mg total) by mouth daily.         Preferred Pharmacy:    [facility] [address] [place], [place] [phone], [phone]  [address] [place] [id number]  Phone: [phone] Fax: [phone]      Past Medical History:    BPH (benign prostatic hyperplasia)    DDD (degenerative disc disease), cervical    DJD (degenerative joint disease), multiple sites    hips, lumbar, cervical    Essential hypertension    High blood pressure    High cholesterol    Hypertension    Incarcerated umbilical hernia    Other and unspecified hyperlipidemia    Primary osteoarthritis of both knees    PVD (peripheral vascular disease) (HCC)    Status post total left knee replacement    Umbilical hernia    Varicose  vein of leg       Past Surgical History:   Procedure Laterality Date    Eye surgery      left     Leg/ankle surgery proc unlisted      left leg    Repair ing hernia,5+y/o,reducibl      Total knee replacement Left 02/26/2019    Dr Lopez       Social History     Socioeconomic History    Marital status:      Spouse name: Not on file    Number of children: 1    Years of education: Not on file    Highest education level: Not on file   Occupational History    Occupation: Maintanence at [employer]     Comment: works 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    Seat Belt Not Asked    Special Diet Not Asked    Stress Concern Not Asked    Weight Concern Not Asked     Service Not Asked    Blood Transfusions Not Asked    Occupational Exposure Not Asked    Hobby Hazards Not Asked    Sleep Concern Not Asked    Back Care Not Asked    Bike Helmet Not Asked    Self-Exams Not Asked   Social History Narrative    - lives with wife    1 son    1 child that passed     Social Determinants of Health     Financial Resource Strain: Not on file   Food Insecurity: Not on file   Transportation Needs: Not on file   Physical Activity: Not on file   Stress: Not on file   Social Connections: Not on file   Housing Stability: Not on file       ECOG:  Grade 0 - Fully active, able to carry on all predisease activities without restrictions.      Education:  Knowledge Deficit Plan Of Care:    Problem:  Knowledge Deficit    Problems related to:    Radiation therapy    Interventions:  Assess patient knowledge level  Assess knowledge needs  Instruct  on treatment planning  Instruct on radiation therapy appointment scheduling  Instruct on purpose of radiation therapy  Instruct on side effects of radiation therapy    Expected Outcomes:  Knowledge of radiation therapy    Progress Toward Outcome:  Making progress    Pamphlets/Handouts Given to Patient:  Understanding radiation therapy      Are ADL's met?  Yes  Does patient feel safe in their environment?  Yes  Care decisions:  Patient and/or surrogate IS involved in care decisions.  Advanced directives:  Patient DOES NOT have advanced directives.  Transportation:  Adequate transportation available for expected visits    Pain: pt denies

## 2024-08-06 ENCOUNTER — HOSPITAL ENCOUNTER (OUTPATIENT)
Dept: RADIATION ONCOLOGY | Facility: HOSPITAL | Age: 79
Discharge: HOME | End: 2024-08-06
Attending: RADIOLOGY
Payer: MEDICARE

## 2024-08-06 ENCOUNTER — TELEPHONE (OUTPATIENT)
Dept: INTERNAL MEDICINE CLINIC | Facility: CLINIC | Age: 79
End: 2024-08-06

## 2024-08-06 VITALS
DIASTOLIC BLOOD PRESSURE: 79 MMHG | TEMPERATURE: 98 F | RESPIRATION RATE: 18 BRPM | OXYGEN SATURATION: 96 % | BODY MASS INDEX: 29 KG/M2 | WEIGHT: 172 LBS | SYSTOLIC BLOOD PRESSURE: 152 MMHG | HEART RATE: 66 BPM

## 2024-08-06 DIAGNOSIS — C61 PROSTATE CANCER (HCC): Primary | ICD-10-CM

## 2024-08-06 DIAGNOSIS — I10 ESSENTIAL HYPERTENSION: Primary | ICD-10-CM

## 2024-08-06 DIAGNOSIS — R97.20 ELEVATED PSA: ICD-10-CM

## 2024-08-06 PROCEDURE — 99214 OFFICE O/P EST MOD 30 MIN: CPT

## 2024-08-06 NOTE — PATIENT INSTRUCTIONS
- WE WILL HAVE MEDICAL ONCOLOGY CALL YOU TO MAKE AN APPOINTMENT TO DISCUSS HORMONE THERAPY    - WE WILL CALL TO SCHEDULE YOUR CT SIMULATION FOR RADIATION PLANNING 4 WEEKS AFTER YOUR HORMONE THERAPY HAS BEGUN       - IF YOU HAVE ANY QUESTIONS OR CONCERNS REGARDING RADIATION THERAPY, PLEASE CALL NURSING LINE AT [phone]. (NURSES ARE DIANA)

## 2024-08-06 NOTE — TELEPHONE ENCOUNTER
Nahomi from Dr. Urrutia's office requesting referral for visit upcoming 8/16 with Dr. Urrutia. Patient being seen for Prostate Cancer.     Fax- [phone]

## 2024-08-06 NOTE — CONSULTS
[facility]  RADIATION ONCOLOGY  CONSULTATION     PATIENT:   Mark Olivarez      REFERRING MD:  Wally Solorzano MD  DIAGNOSIS:   Prostate cancer (T1c N0, GG 3, PSA 13)    CC:    Discuss curative intent EBRT    HPI   79 year old here with wife for consultation.    His most recent PSA on 2024 is 13.5.      Prostate MRI 2024 shows an 86 cc gland.  There is a 6 mm PI-RADS 4 lesion in the left peripheral apex.  No extracapsular extension.  Unremarkable pelvic nodes and seminal vesicles.    He had a prostate biopsy on 2024 by Dr. Solorzano showing Pete group 3 adenocarcinoma in 8 cores.  Pete group 1 in 1 core.    PET scan 2024 shows 3 sites of uptake, SUV 15.3, 7.1, 11.2 within the prostate.  There is a fourth focus of uptake near the left seminal vesicle with SUV 9.8.  No abnormal pelvic nodes.  No suspicious skeletal lesions.    AUA symptom score is 6.  He had a recent PVR of less than 10.  He reports unremarkable BMs.  He takes 1 Flomax daily.  He drinks a lot of coffee.      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  .  Has an adult son.    FH  -Brother with prostate cancer age 60    ROS  -pertinent items in HPI.    PHYSICAL EXAM   ECO  GENERAL: 172 pounds.  HEENT:  No neck masses, supple.  CHEST:  Regular rate and rhythm. Normal S1 and S2.    Clear to auscultation.   ABD:  Soft, non-tender.   EXT:  No edema.  NEURO:  Alert and oriented.    IMPRESSION/PLAN   79-year-old man with unfavorable intermediate risk prostate cancer.  Max Pete group 3, PSA 13.5, nonpalpable disease, 6 mm nodule on MRI, PET scan with uptake within the prostate, borderline left seminal vesicle.  9 total positive cores.    Discussed treatment options including EBRT with or without ADT, radical prostatectomy.  Given his age, we would favor the former  approach.  He is 79 years old, but I think his disease volume is significant, and would probably benefit from short term ADT.  Will refer him to medical oncology for that.  He might be a candidate for a clinical trial.  But for now we are planning short-term ADT with a 6-week course of hypofractionated EBRT.  We had a discussion about short and long-term urinary and bowel side effects.  We are planning to start EBRT about 4 weeks after ADT begins.  Will schedule CT simulation 2 weeks after ADT.    Abdi Bennett MD  Radiation Oncology    CC: JUAN LUIS Solorzano MD    50 minutes were spent with the patient, more than 50 percent on counseling/coordination of care (discuss disease status, goals of therapy, methods of radiation therapy, side effect discussion, role of RT in overall care)

## 2024-08-07 NOTE — TELEPHONE ENCOUNTER
Dr. Urrutia's (oncology) office called to request referral be placed for patient r/t prostate cancer.     Last OV:  2/22/24  Elevated PSA did see urology in the past however never followed up.    Referral to requested oncologist pended. Please sign if appropriate.

## 2024-08-07 NOTE — TELEPHONE ENCOUNTER
Protocol failed     Amlodipine 5mg     LOV: 2/22/24  RTC: 6 months   Filled: 5/6/24 #90   Labs: 2/22/24   Future Appointments   Date Time Provider Department Center   8/16/2024  1:00 PM Jose Urrutia MD  [facility]   9/5/2024  1:15 PM Wally Solorzano MD [id number]

## 2024-08-08 RX ORDER — AMLODIPINE BESYLATE 5 MG/1
5 TABLET ORAL DAILY
Qty: 90 TABLET | Refills: 0 | Status: SHIPPED | OUTPATIENT
Start: 2024-08-08

## 2024-08-15 DIAGNOSIS — I10 ESSENTIAL HYPERTENSION: ICD-10-CM

## 2024-08-15 RX ORDER — LOSARTAN POTASSIUM AND HYDROCHLOROTHIAZIDE 12.5; 1 MG/1; MG/1
1 TABLET ORAL
Qty: 90 TABLET | Refills: 0 | Status: SHIPPED | OUTPATIENT
Start: 2024-08-15

## 2024-08-16 ENCOUNTER — OFFICE VISIT (OUTPATIENT)
Dept: HEMATOLOGY/ONCOLOGY | Facility: HOSPITAL | Age: 79
End: 2024-08-16
Attending: INTERNAL MEDICINE
Payer: MEDICARE

## 2024-08-16 VITALS
WEIGHT: 177 LBS | RESPIRATION RATE: 18 BRPM | TEMPERATURE: 98 F | OXYGEN SATURATION: 95 % | SYSTOLIC BLOOD PRESSURE: 184 MMHG | HEART RATE: 65 BPM | BODY MASS INDEX: 29.49 KG/M2 | HEIGHT: 65 IN | DIASTOLIC BLOOD PRESSURE: 67 MMHG

## 2024-08-16 DIAGNOSIS — I10 ESSENTIAL HYPERTENSION: Primary | ICD-10-CM

## 2024-08-16 PROBLEM — C61 PROSTATE CANCER (HCC): Status: ACTIVE | Noted: 2024-08-16

## 2024-08-16 PROCEDURE — 99205 OFFICE O/P NEW HI 60 MIN: CPT | Performed by: INTERNAL MEDICINE
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
## 2024-08-16 NOTE — CONSULTS
Cancer Center Report of Consultation    Patient Name: Mark Olivarez   YOB: 1945   Medical Record Number: [id number]   CSN: [id number]   Consulting Physician: Jose Urrutia M.D.   Referring Physician: Abdi Bennett    Date of Consultation: 8/16/2024     Reason for Consultation (Chief Complaint):  No chief complaint on file.       History of Present Illness:  Mark Olivarez is a 79 year old male referred by Dr. Bennett for evaluation and treatment of prostate cancer.      Prostate MRI 5/21/2024 shows an 86 cc gland.  There is a 6 mm PI-RADS 4 lesion in the left peripheral apex.  No extracapsular extension.  Unremarkable pelvic nodes and seminal vesicles.     He had a prostate biopsy on 6/26/2024 by Dr. Solorzano showing Pete group 3 adenocarcinoma in 8 cores.  Pete group 1 in 1 core.     PET scan 7/25/2024 shows 3 sites of uptake, SUV 15.3, 7.1, 11.2 within the prostate.  There is a fourth focus of uptake near the left seminal vesicle with SUV 9.8.  No abnormal pelvic nodes.  No suspicious skeletal lesions.    Past Medical History:  Past Medical History:    BPH (benign prostatic hyperplasia)    DDD (degenerative disc disease), cervical    DJD (degenerative joint disease), multiple sites    hips, lumbar, cervical    Essential hypertension    High blood pressure    High cholesterol    Hypertension    Incarcerated umbilical hernia    Other and unspecified hyperlipidemia    Primary osteoarthritis of both knees    PVD (peripheral vascular disease) (HCC)    Status post total left knee replacement    Umbilical hernia    Varicose vein of leg       Past Surgical History:  Past Surgical History:   Procedure Laterality Date    Eye surgery      left     Leg/ankle surgery proc unlisted      left leg    Repair ing hernia,5+y/o,reducibl      Total knee replacement Left 02/26/2019    Dr Lopez       Gynecologic History:      Family History:  Family History   Problem Relation Age of Onset    Other (Other)  Father         alz    Other (Other) Mother         alz    Cancer Brother 60        prostate       Social History:  Social History     Socioeconomic History    Marital status:      Spouse name: Not on file    Number of children: 1    Years of education: Not on file    Highest education level: Not on file   Occupational History    Occupation: Maintanence at [employer]     Comment: works 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    Seat Belt Not Asked    Special Diet Not Asked    Stress Concern Not Asked    Weight Concern Not Asked     Service Not Asked    Blood Transfusions Not Asked    Occupational Exposure Not Asked    Hobby Hazards Not Asked    Sleep Concern Not Asked    Back Care Not Asked    Bike Helmet Not Asked    Self-Exams Not Asked   Social History Narrative    - lives with wife    1 son    1 child that passed     Social Determinants of Health     Financial Resource Strain: Not on file   Food Insecurity: Not on file   Transportation Needs: Not on file   Physical Activity: Not on file   Stress: Not on file   Social Connections: Not on file   Housing Stability: Not on file       Current Medications:    Current Outpatient Medications:     LOSARTAN POTASSIUM-HCTZ 100-12.5 MG Oral Tab, Take 1 tablet by mouth once daily, Disp: 90 tablet, Rfl: 0    AMLODIPINE 5 MG Oral Tab, Take 1 tablet by mouth once daily, Disp: 90 tablet, Rfl: 0    tamsulosin 0.4 MG Oral Cap, Take 1 capsule (0.4 mg total) by mouth every evening., Disp: 90 capsule, Rfl: 6    Potassium Chloride ER 20 MEQ Oral Tab CR, Take 20 mEq by mouth  daily., Disp: 90 tablet, Rfl: 1    aspirin 81 MG Oral Tab, Take 1 tablet (81 mg total) by mouth daily., Disp: , Rfl:     Allergies:  Allergies   Allergen Reactions    Ciprofloxacin HYPOTENSION, OTHER (SEE COMMENTS) and NAUSEA ONLY    Pcn [Penicillins] HIVES        Review of Systems:    Constitutional No fevers, chills, night sweats, excessive fatigue or weight loss.   Eyes No significant visual difficulties. No diplopia. No yellowing of the eyes.   Hematologic/Lymphatic No easy bruising or bleeding.  No any tender or palpable lymph nodes.   Respiratory No dyspnea, Pleuritic chest pain, cough or hemoptysis.   Cardiovascular No anginal chest pain, palpitations or orthopnea.   Gastrointestinal No nausea, vomiting, diarrhea, GI bleeding, or constipation.   Genitorurinary (M) No hematuria, dysuria, or incontinence. No abnormal bleeding.   Integumentary No rashes or yellowing of the skin   Neurologic No headache, blurred vision, and no areas of focal weakness. Normal gait.   Psychiatric No insomnia, depression, joe or mood swings.         Vital Signs:  BP (!) 184/67 (BP Location: Left arm, Patient Position: Sitting, Cuff Size: adult)   Pulse 65   Temp 98.4 °F (36.9 °C) (Temporal)   Resp 18   Ht 1.651 m (5' 5\")   Wt 80.3 kg (177 lb)   SpO2 95%   BMI 29.45 kg/m²     Performance Status:  ECOG 0    Physical Examination:    Constitutional Normal - Alert, cooperative, oriented. Mood and affect appropriate. Appears close to chronological age. Well nourished. Well developed.   Eyes Normal - Conjunctivae and sclerae are clear and without icterus. Pupils are reactive and equal.   ENMT Normal - Sinuses are nontender.  No oral exudates, ulcers, masses, thrush or mucositis. Oropharynx clear.  Tongue normal.   Neck Normal - Supple without masses or thyromegaly.   Hematologic/Lymphatic Normal - No petechiae or purpura.  No tender or palpable lymph nodes in the cervical, supraclavicular, axillary or inguinal area.   Respiratory  Normal - Lungs are clear to auscultation without rhonchi or wheezing.   Cardiovascular Normal - Regular rate and rhythm of heart without murmurs, gallops or rubs.   Abdomen Normal - Non-tender, non-distended, no masses, ascites or hepatosplenomegaly. Good bowel sounds. No guarding or rebound tenderness. No pulsatile masses.   Extremities Normal - No visible deformities, no cyanosis, clubbing or edema. Pulses 4+ and equal bilaterally.   Integumentary Normal - No rashes, scars, or lesions suggestive of malignancy.   Neurologic Normal - No sensory or motor deficits, normal cerebellar function, normal gait, cranial nerves intact.   Psychiatric Normal - Alert and oriented times three. Coherent speech. Verbalizes understanding of our discussions today.       Laboratory:            Radiology:    Pathology:  Final Diagnosis Comment                Location Pete Score % of Pattern 4  Grade Group   Positive Cores / Total Cores  Tumor Length (mm)  % Tissue Involved       A. Region of interest 4 + 3 = 7 75% 3 3/4 11 mm 70%    B. Right base NA              C. Right mid NA              D. Right apex 3 + 3 = 6 NA 1 1/2 2 mm 10%    E. Left base 4 + 3 = 7 80% 3 2/2 10 mm 70%    F. Left mid 4 + 3 = 7 70% 3 2/2 6.5 mm 40%    G. Left apex 4 + 3 = 7 65% 3 1/2 3.5 mm 15%          Impression and Plan:  Prostate Cancer: Unfavorable intermediate risk. We reviewed the pathophysiology of prostate cancer and the usual pattern of progression. Using the NCCN guidelines as a teaching tool, we reviewed the risk stratification and the standard treatment options. We discussed the surgical approach and RT+ADT. We also discussed the availability of NRG [id number] using the decipher score to add to the risk stratification and then randomize to treatment escalation or de-escalation. He is not interested in the trial and would like to start therapy as soon as possible. He has decided upon RT+ ADT. Will start ADT ASAP and plan radiation in a  month.    Planned Follow Up:  8 weeks    I spent 65 minutes face to face with the patient.  More than 50% of that time was spent counseling the patient and/or on coordination of care.  The diagnosis, prognosis, and general treatment was explained to the patient and the family.    Risk Level: High    Electronically Signed by:    Jose Urrutia M.D.  [facility]

## 2024-08-19 ENCOUNTER — TELEPHONE (OUTPATIENT)
Dept: HEMATOLOGY/ONCOLOGY | Facility: HOSPITAL | Age: 79
End: 2024-08-19

## 2024-08-29 ENCOUNTER — OFFICE VISIT (OUTPATIENT)
Dept: HEMATOLOGY/ONCOLOGY | Facility: HOSPITAL | Age: 79
End: 2024-08-29
Attending: INTERNAL MEDICINE
Payer: MEDICARE

## 2024-08-29 VITALS
SYSTOLIC BLOOD PRESSURE: 165 MMHG | HEIGHT: 65 IN | DIASTOLIC BLOOD PRESSURE: 85 MMHG | BODY MASS INDEX: 28.82 KG/M2 | RESPIRATION RATE: 18 BRPM | HEART RATE: 73 BPM | OXYGEN SATURATION: 97 % | WEIGHT: 173 LBS | TEMPERATURE: 98 F

## 2024-08-29 DIAGNOSIS — C61 PROSTATE CANCER (HCC): Primary | ICD-10-CM

## 2024-08-29 PROCEDURE — 96402 CHEMO HORMON ANTINEOPL SQ/IM: CPT

## 2024-08-29 NOTE — PROGRESS NOTES
Education Record    Learner:  Patient and Spouse    Disease / Diagnosis: eligard inj 45mg 6 month dose, 1st dose    Barriers / Limitations:  None   Comments:    Method:  Brief focused   Comments:    General Topics:  Plan of care reviewed   Comments:    Outcome:  Shows understanding   Comments:    Tolerated inj well without issue. Printed out information on Eligard. Appt made with Dr. Urrutai in October for followup.

## 2024-09-05 ENCOUNTER — OFFICE VISIT (OUTPATIENT)
Dept: SURGERY | Facility: CLINIC | Age: 79
End: 2024-09-05

## 2024-09-05 DIAGNOSIS — R82.90 URINE FINDING: Primary | ICD-10-CM

## 2024-09-05 LAB
APPEARANCE: CLEAR
BILIRUBIN: NEGATIVE
GLUCOSE (URINE DIPSTICK): NEGATIVE MG/DL
KETONES (URINE DIPSTICK): NEGATIVE MG/DL
LEUKOCYTES: NEGATIVE
MULTISTIX LOT#: NORMAL NUMERIC
NITRITE, URINE: NEGATIVE
OCCULT BLOOD: NEGATIVE
PH, URINE: 7 (ref 4.5–8)
PROTEIN (URINE DIPSTICK): NEGATIVE MG/DL
SPECIFIC GRAVITY: 1.01 (ref 1–1.03)
URINE-COLOR: YELLOW
UROBILINOGEN,SEMI-QN: 0.2 MG/DL (ref 0–1.9)

## 2024-09-05 PROCEDURE — 1159F MED LIST DOCD IN RCRD: CPT | Performed by: UROLOGY

## 2024-09-05 PROCEDURE — 1160F RVW MEDS BY RX/DR IN RCRD: CPT | Performed by: UROLOGY

## 2024-09-05 PROCEDURE — 81003 URINALYSIS AUTO W/O SCOPE: CPT | Performed by: UROLOGY

## 2024-09-05 PROCEDURE — 99213 OFFICE O/P EST LOW 20 MIN: CPT | Performed by: UROLOGY

## 2024-09-05 NOTE — PROGRESS NOTES
Urology Clinic Note    Primary Care Provider:  Mike Buchanan MD     Chief Complaint:   Prostate cancer     HPI:        Mark Olivarez is a 79 year old male with history of HTN, HLD, PVD referred for LUTS, elev psa.  Now found to have NCCN unfavorable intermediate risk prostate cancer (i PSA 13.5, [place] 4+3, clinical stage T1c).      Patient previously saw Dr. Bridges about 3 years ago; history of elevated PSA and BPH. PVR was 60cc. At that time PSA had been stable for several years and observation was recommended. He apparently has not had a biopsy before.      Not on any prostate medications.   He then saw me for ongoing PSA rise; now 13.50 from 10 last year and 9 in 2021.   CT in 2021; 80-90gm prostate.   PSA D about ~0.15.      MR 5/21/24;    Impression  CONCLUSION:  There is a small, 5 x 6 mm nodule within the left peripheral zone posteriorly at the apex with diffusion restriction and early arterial phase enhancement, consistent with a PI rads 4 lesion.     He has mild LUTS; AUA SS is 7. PVR 7cc and UA negative.      Biopsy was done on 6/26;   Target; gl 4+3   RA; 3+3  LB, LM, LA; 4+3     PET Scan 7/25/24; negative for mets.     Saw Dr. Bennett for XRT and Dr. Urrutia for ADT;   Lupron start on 8.29.    CT Stim not done, has not yet started radiation- unclear why fu hasn't been made.   Doing well- no new urinary issues.   UA negative.         PSA:  Lab Results   Component Value Date    PSA 10.40 (H) 03/09/2023    PSA 9.96 (H) 07/01/2021    PSA 6.84 (H) 09/02/2020    PSA 8.11 (H) 02/07/2020    PSA 8.51 (H) 01/31/2019    PSAS 13.50 (H) 02/22/2024    PSAS 5.05 (H) 10/06/2017    QPSA 3.7 02/20/2017    QPSA 4.5 (H) 07/15/2016    QPSA 3.3 07/09/2015        History:     Past Medical History:    BPH (benign prostatic hyperplasia)    DDD (degenerative disc disease), cervical    DJD (degenerative joint disease), multiple sites    hips, lumbar, cervical    Essential hypertension    High blood pressure    High  cholesterol    Hypertension    Incarcerated umbilical hernia    Other and unspecified hyperlipidemia    Primary osteoarthritis of both knees    PVD (peripheral vascular disease) (HCC)    Status post total left knee replacement    Umbilical hernia    Varicose vein of leg       Past Surgical History:   Procedure Laterality Date    Eye surgery      left     Leg/ankle surgery proc unlisted      left leg    Repair ing hernia,5+y/o,reducibl      Total knee replacement Left 02/26/2019    Dr Lopez       Family History   Problem Relation Age of Onset    Other (Other) Father         alz    Other (Other) Mother         alz    Cancer Brother 60        prostate       Social History     Socioeconomic History    Marital status:     Number of children: 1   Occupational History    Occupation: [employer] at [employer]     Comment: works 1pm-10pm   Tobacco Use    Smoking status: Former     Types: Cigarettes, Cigars    Smokeless tobacco: Never    Tobacco comments:     hx of cigar smoking   Vaping Use    Vaping status: Never Used   Substance and Sexual Activity    Alcohol use: No    Drug use: No   Other Topics Concern    Caffeine Concern Yes     Comment: 3 cups of coffee daily    Exercise Yes     Comment: physical job, walks a lot daily.   Social History Narrative    - lives with wife    1 son    1 child that passed       Medications (Active prior to today's visit):  Current Outpatient Medications   Medication Sig Dispense Refill    LOSARTAN POTASSIUM-HCTZ 100-12.5 MG Oral Tab Take 1 tablet by mouth once daily 90 tablet 0    AMLODIPINE 5 MG Oral Tab Take 1 tablet by mouth once daily 90 tablet 0    tamsulosin 0.4 MG Oral Cap Take 1 capsule (0.4 mg total) by mouth every evening. 90 capsule 6    aspirin 81 MG Oral Tab Take 1 tablet (81 mg total) by mouth daily.         Allergies:  Allergies   Allergen Reactions    Ciprofloxacin HYPOTENSION, OTHER (SEE COMMENTS) and NAUSEA ONLY    Pcn [Penicillins] HIVES       Review of  Systems:   A comprehensive 10-point review of systems was completed.  Pertinent positives and negatives are noted in the the HPI.    Physical Exam:   CONSTITUTIONAL: Well developed, well nourished, in no acute distress  ABDOMEN: Soft, non-tender, non-distended       Assessment & Plan:      Mark Olivarez is a 79 year old male with history of HTN, HLD, PVD referred for LUTS, elev psa.  Now found to have NCCN unfavorable intermediate risk prostate cancer (i PSA 13.5, [place] 4+3, clinical stage T1c).     # CaP  Continue ADT with oncology, XRT with Dr. Bennett- does not have follow up with Dr. Bennett- patient told to call office and I have sent a note as well.    RTC 6mo    In total, 20 minutes were spent on this patient encounter (including chart review, patient history, physical, and counseling, documentation, and communication).    Wally Solorzano MD  Staff Urologist  [facility]  Office: [phone]

## 2024-09-12 DIAGNOSIS — E87.6 HYPOKALEMIA: ICD-10-CM

## 2024-09-12 RX ORDER — POTASSIUM CHLORIDE 1500 MG/1
1 TABLET, EXTENDED RELEASE ORAL DAILY
Qty: 90 TABLET | Refills: 0 | Status: SHIPPED | OUTPATIENT
Start: 2024-09-12

## 2024-09-19 ENCOUNTER — HOSPITAL ENCOUNTER (OUTPATIENT)
Dept: RADIATION ONCOLOGY | Facility: HOSPITAL | Age: 79
Discharge: HOME | End: 2024-09-19
Attending: RADIOLOGY
Payer: MEDICARE

## 2024-09-19 PROCEDURE — 77399 UNLISTED PX MED RADJ PHYSICS: CPT | Performed by: RADIOLOGY

## 2024-09-19 PROCEDURE — 77334 RADIATION TREATMENT AID(S): CPT | Performed by: RADIOLOGY

## 2024-09-24 PROCEDURE — 77338 DESIGN MLC DEVICE FOR IMRT: CPT | Performed by: RADIOLOGY

## 2024-09-24 PROCEDURE — 77301 RADIOTHERAPY DOSE PLAN IMRT: CPT | Performed by: RADIOLOGY

## 2024-09-24 PROCEDURE — 77300 RADIATION THERAPY DOSE PLAN: CPT | Performed by: RADIOLOGY

## 2024-10-02 ENCOUNTER — TELEPHONE (OUTPATIENT)
Dept: RADIATION ONCOLOGY | Facility: HOSPITAL | Age: 79
End: 2024-10-02

## 2024-10-11 ENCOUNTER — OFFICE VISIT (OUTPATIENT)
Dept: HEMATOLOGY/ONCOLOGY | Facility: HOSPITAL | Age: 79
End: 2024-10-11
Attending: INTERNAL MEDICINE
Payer: MEDICARE

## 2024-10-11 DIAGNOSIS — C61 PROSTATE CANCER (HCC): Primary | ICD-10-CM

## 2024-10-14 ENCOUNTER — HOSPITAL ENCOUNTER (OUTPATIENT)
Dept: RADIATION ONCOLOGY | Facility: HOSPITAL | Age: 79
Discharge: HOME | End: 2024-10-14
Attending: RADIOLOGY
Payer: MEDICARE

## 2024-10-14 PROCEDURE — 77385 HC IMRT SIMPLE: CPT | Performed by: RADIOLOGY

## 2024-10-15 PROCEDURE — 77385 HC IMRT SIMPLE: CPT | Performed by: RADIOLOGY

## 2024-10-16 PROCEDURE — 77385 HC IMRT SIMPLE: CPT | Performed by: RADIOLOGY

## 2024-10-16 NOTE — PROGRESS NOTES
[facility] Radiation Treatment Management Note 1-5    Patient:  Mark Olivarez  Age:  79 year old  Visit Diagnosis:    1. Prostate cancer (HCC)      Primary Rad/Onc:  Dr. Abdi Bennett    Site Delivered Dose (cGy) Prescribed Dose (cGy) Fraction #   PROSTATE 1000 7000 4/28     First treatment date:   10/14/24  Concurrent chemotherapy:  N/A        8/16/2024    12:56 PM 8/29/2024     1:58 PM 10/17/2024    12:39 PM   Oncology Vitals   Height 5' 5\" 5' 5\"    Height 165 cm 165 cm    Weight 177 lb 173 lb 172 lb 3.2 oz   Weight 80.287 kg 78.472 kg 78.109 kg   BSA (m2) 1.88 m2 1.86 m2 1.86 m2   BMI 29.45 kg/m2 28.79 kg/m2 28.66 kg/m2   /67 165/85 167/76   Pulse 65 73 59   Resp 18 18 20   Temp 98.4 °F (36.9 °C) 97.9 °F (36.6 °C) 97.6 °F (36.4 °C)   SpO2 95 % 97 % 98 %   Pain Score   0        Toxicities:  Fatigue Grade 0= None  Constipation Grade 0= None  Diarrhea  Grade 0= None  Dysuria on urination Grade 0= None   Urgency on urination Grade 0= None  Frequency on urinationGrade 0= None  Urine stream strength Moderate  Urine Incontinence Grade 0= None  Nocturia Grade 1= Present X 3 nightly     Nursing Note:  RN ED done with pt:  Reviewed potential side effects of RT and management.  Reviewed full bladder instructions.   To meet with LUCRETIA García.  Pt cont to take Flomax 0.4mg at bedtime.    Cely GONZALEZ RN MD NOTE   Reviewed and agree with RN note above.  Setup imaging reviewed in [facility] and approved.    S:  -doing good. Some hesitancy. On 1 Flomax.   BMs same  Tired from ADT    O:  -no change    A/P:  -cont RT  Dental work ok  Monitor urine hesitancy - could increase Flomax if needed    OTV in 1 week    Abdi Bennett MD  Radiation Oncology

## 2024-10-17 ENCOUNTER — HOSPITAL ENCOUNTER (OUTPATIENT)
Dept: RADIATION ONCOLOGY | Facility: HOSPITAL | Age: 79
Discharge: HOME | End: 2024-10-17
Attending: RADIOLOGY
Payer: MEDICARE

## 2024-10-17 ENCOUNTER — OFFICE VISIT (OUTPATIENT)
Dept: HEMATOLOGY/ONCOLOGY | Facility: HOSPITAL | Age: 79
End: 2024-10-17
Attending: INTERNAL MEDICINE
Payer: MEDICARE

## 2024-10-17 VITALS
WEIGHT: 172.19 LBS | SYSTOLIC BLOOD PRESSURE: 167 MMHG | TEMPERATURE: 98 F | OXYGEN SATURATION: 98 % | HEART RATE: 59 BPM | RESPIRATION RATE: 20 BRPM | DIASTOLIC BLOOD PRESSURE: 76 MMHG | BODY MASS INDEX: 29 KG/M2

## 2024-10-17 DIAGNOSIS — C61 PROSTATE CANCER (HCC): Primary | ICD-10-CM

## 2024-10-17 PROCEDURE — 77385 HC IMRT SIMPLE: CPT | Performed by: RADIOLOGY

## 2024-10-18 PROCEDURE — 77336 RADIATION PHYSICS CONSULT: CPT | Performed by: RADIOLOGY

## 2024-10-18 PROCEDURE — 77385 HC IMRT SIMPLE: CPT | Performed by: RADIOLOGY

## 2024-10-21 PROCEDURE — 77385 HC IMRT SIMPLE: CPT | Performed by: RADIOLOGY

## 2024-10-21 NOTE — PROGRESS NOTES
Oncology Nutrition Consultation    Patient Name: Mark Olivarez  YOB: 1945  Medical Record Number: [id number]   Account Number: [id number]  Dietitian: Raquel Pacheco RD, LDN    Date of visit: 10/17/2024    Diet Rx: high protein/quality as tolerated; low residue pending diarrhea    Pertinent Dx/PMH: Prostate cancer    Past Medical History:    BPH (benign prostatic hyperplasia)    DDD (degenerative disc disease), cervical    DJD (degenerative joint disease), multiple sites    hips, lumbar, cervical    Essential hypertension    High blood pressure    High cholesterol    Hypertension    Incarcerated umbilical hernia    Other and unspecified hyperlipidemia    Primary osteoarthritis of both knees    PVD (peripheral vascular disease) (HCC)    Status post total left knee replacement    Umbilical hernia    Varicose vein of leg       TX: RT (11/20)    Other pertinent subjective/objective information: diet/activity/sx hx obtained    Pertinent Meds:    Current Outpatient Medications:     POTASSIUM CHLORIDE ER 20 MEQ Oral Tab CR, Take 1 tablet by mouth once daily, Disp: 90 tablet, Rfl: 0    LOSARTAN POTASSIUM-HCTZ 100-12.5 MG Oral Tab, Take 1 tablet by mouth once daily, Disp: 90 tablet, Rfl: 0    AMLODIPINE 5 MG Oral Tab, Take 1 tablet by mouth once daily, Disp: 90 tablet, Rfl: 0    tamsulosin 0.4 MG Oral Cap, Take 1 capsule (0.4 mg total) by mouth every evening., Disp: 90 capsule, Rfl: 6    aspirin 81 MG Oral Tab, Take 1 tablet (81 mg total) by mouth daily., Disp: , Rfl:     Pertinent Labs:     Height: 5'5\"            IBW: 136 +/- 10%    WT HX:   Wt Readings from Last 9 Encounters:   10/17/24 78.1 kg (172 lb 3.2 oz)   08/29/24 78.5 kg (173 lb)   08/16/24 80.3 kg (177 lb)   08/06/24 78 kg (172 lb)   02/22/24 78.9 kg (174 lb)   07/20/23 78.7 kg (173 lb 6.4 oz)   01/19/23 82.7 kg (182 lb 6.4 oz)   06/15/22 81.5 kg (179 lb 9.6 oz)   04/11/22 82.6 kg (182 lb 3.2 oz)       Estimated Nutrition Needs: 20-22 kcals/kg  = 4494-7444 KCALS/d; 1.3 gms protein/kg = 94 gms/d    Services Provided: Verbal and written ix provided addressing -  importance of nutrition during tx; potential nutrition related side effects of tx and ways to address (low residue pending diarrhea)    Assessment/Plan: RD met w/ this pleasant, talkative, 78 y/o male after his RT today. Pt noted tolerating tx well thus far.     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 Pt noted BM 1-3x/d and more frequent since starting hormones.     Diet hx revealed coffee w/ banana or scrambled eggs/hash browns for breakfast; snack of fruit; lunch at [id number] consisting of oats or a sandwich; [id number] snack of boiled eggs or tea/crackers/cheese when home from work.     RD reviewed recommendations as noted requesting pt increase protein intake q meal/snack offering suggestions. RD noted low residue diet pending diarrhea.     Pt actively participated throughout verbalizing understanding of recommendations made. RD offered support and will continue to monitor throughout tx.       Thank you for allowing me to participate in the care of Mark.      The 21st Century Cures Act makes medical notes like these available to patients in the interest of transparency. Please be advised this is a medical document. Medical documents are intended to carry relevant information, facts as evident, and the clinical opinion of the practitioner. The medical note is intended as peer to peer communication and may appear blunt or direct. It is written in medical language and may contain abbreviations or verbiage that are unfamiliar.

## 2024-10-22 PROCEDURE — 77385 HC IMRT SIMPLE: CPT | Performed by: RADIOLOGY

## 2024-10-23 PROCEDURE — 77385 HC IMRT SIMPLE: CPT | Performed by: RADIOLOGY

## 2024-10-24 ENCOUNTER — HOSPITAL ENCOUNTER (OUTPATIENT)
Dept: RADIATION ONCOLOGY | Facility: HOSPITAL | Age: 79
Discharge: HOME | End: 2024-10-24
Attending: RADIOLOGY
Payer: MEDICARE

## 2024-10-24 VITALS
BODY MASS INDEX: 29 KG/M2 | WEIGHT: 174.63 LBS | TEMPERATURE: 98 F | RESPIRATION RATE: 16 BRPM | DIASTOLIC BLOOD PRESSURE: 77 MMHG | HEART RATE: 57 BPM | OXYGEN SATURATION: 87 % | SYSTOLIC BLOOD PRESSURE: 165 MMHG

## 2024-10-24 DIAGNOSIS — C61 PROSTATE CANCER (HCC): Primary | ICD-10-CM

## 2024-10-24 PROCEDURE — 77385 HC IMRT SIMPLE: CPT | Performed by: RADIOLOGY

## 2024-10-24 NOTE — PROGRESS NOTES
[facility] Radiation Treatment Management Note 6-10    Patient:  Mark Olivarez  Age:  79 year old  Visit Diagnosis:    1. Prostate cancer (HCC)      Primary Rad/Onc:  Dr. Abdi Bennett    Site Delivered Dose (cGy) Prescribed Dose (cGy) Fraction #   PROSTATE 2250 7000 9/28     First treatment date:   10/14/24  Concurrent chemotherapy:  N/A        8/29/2024     1:58 PM 10/17/2024    12:39 PM 10/24/2024    10:35 AM   Oncology Vitals   Height 5' 5\"     Height 165 cm     Weight 173 lb 172 lb 3.2 oz 174 lb 9.6 oz   Weight 78.472 kg 78.109 kg 79.198 kg   BSA (m2) 1.86 m2 1.86 m2 1.87 m2   BMI 28.79 kg/m2 28.66 kg/m2 29.05 kg/m2   /85 167/76 165/77   Pulse 73 59 57   Resp 18 20 16   Temp 97.9 °F (36.6 °C) 97.6 °F (36.4 °C) 97.8 °F (36.6 °C)   SpO2 97 % 98 % 87 %   Pain Score  0 0        Toxicities:  Fatigue Grade 1= Fatigue relieved by rest  Constipation Grade 0= None  Diarrhea  Grade 0= None  Dysuria on urination Grade 0= None   Urgency on urination Grade 0= None  Frequency on urinationGrade 1= Present  Urine stream strength Weak  Urine Incontinence Grade 0= None  Nocturia Grade 1= Present X 3 nightly     Nursing Note:  Appetite fair.  Had episode of loose stools 1-2x, no diarrhea.  States BM better now, no c/o proctitis.  Increased frequency with urination, states every 1.5 -2 hours during the day.  Stream slow, but c/o burning sensation.  Cont to take Flomax 0.4mg at bedtime.    Cely GONZALEZ RN MD NOTE   Reviewed and agree with RN note above.  Setup imaging reviewed in ARIA and approved.    S:  -more LUTS and some tenesmus type symptoms    O:  -no changes    A/P:  -try taking Flomax bid  -will hold off on steroid suppositories for now    OTV in 1 week    Abdi Bennett MD  Radiation Oncology

## 2024-10-25 PROCEDURE — 77385 HC IMRT SIMPLE: CPT | Performed by: RADIOLOGY

## 2024-10-25 PROCEDURE — 77336 RADIATION PHYSICS CONSULT: CPT | Performed by: RADIOLOGY

## 2024-10-28 PROCEDURE — 77385 HC IMRT SIMPLE: CPT | Performed by: RADIOLOGY

## 2024-10-29 PROCEDURE — 77385 HC IMRT SIMPLE: CPT | Performed by: RADIOLOGY

## 2024-10-30 PROCEDURE — 77385 HC IMRT SIMPLE: CPT | Performed by: RADIOLOGY

## 2024-10-30 NOTE — PROGRESS NOTES
[facility] Radiation Treatment Management Note 11-15    Patient:  Mark Olivarez  Age:  79 year old  Visit Diagnosis:    1. Prostate cancer (HCC)      Primary Rad/Onc:  Dr. Abdi Bennett    Site Delivered Dose (cGy) Prescribed Dose (cGy) Fraction #   PROSTATE 3500 7000 14/28     First treatment date:  10/14/24  Concurrent chemotherapy: N/A        10/24/2024    10:35 AM 10/31/2024    10:43 AM 10/31/2024    10:55 AM   Oncology Vitals   Weight 174 lb 9.6 oz 173 lb 12.8 oz    Weight 79.198 kg 78.835 kg    BSA (m2) 1.87 m2 1.86 m2    BMI 29.05 kg/m2 28.92 kg/m2    /77  149/71   Pulse 57  56   Resp 16  18   Temp 97.8 °F (36.6 °C)  98.2 °F (36.8 °C)   SpO2 87 %  98 %   Pain Score 0  0        Toxicities:  Fatigue Grade 2= Fatigue not relieved by rest limiting instrumental ADL  Constipation Grade 0= None  Diarrhea  Grade 1= Increase of <4 stools per day over baseline; mild increase in ostomy output compared to baseline   Dysuria on urination Grade 0= None   Urgency on urination Grade 1= Present  Frequency on urinationGrade 1= Present  Urine stream strength Weak  Urine Incontinence Grade 0= None  Nocturia Grade 1= Present X 3 nightly     Nursing Note:  Pt reports urinary frequency  Reports urinates every hour during the day  Has some stool urgency  Has pressure in rectum at times  Had diarrhea once daily for a few days  Reviewed mirolax and imodium instructions    Caitlin GONZALEZ RN MD NOTE   Reviewed and agree with RN note above.  Setup imaging reviewed in ARIA and approved.    S:  -LUTS  -more BMs    O:  -no changes    A/P:  -Imodium prn; diet reviewed  -continue tamsulosin    OTV in 1 week    Abdi Bennett MD  Radiation Oncology

## 2024-10-31 ENCOUNTER — HOSPITAL ENCOUNTER (OUTPATIENT)
Dept: RADIATION ONCOLOGY | Facility: HOSPITAL | Age: 79
Discharge: HOME | End: 2024-10-31
Attending: RADIOLOGY
Payer: MEDICARE

## 2024-10-31 VITALS
OXYGEN SATURATION: 98 % | SYSTOLIC BLOOD PRESSURE: 149 MMHG | TEMPERATURE: 98 F | RESPIRATION RATE: 18 BRPM | DIASTOLIC BLOOD PRESSURE: 71 MMHG | BODY MASS INDEX: 29 KG/M2 | WEIGHT: 173.81 LBS | HEART RATE: 56 BPM

## 2024-10-31 DIAGNOSIS — C61 PROSTATE CANCER (HCC): Primary | ICD-10-CM

## 2024-10-31 PROCEDURE — 77385 HC IMRT SIMPLE: CPT | Performed by: RADIOLOGY

## 2024-11-01 ENCOUNTER — HOSPITAL ENCOUNTER (OUTPATIENT)
Dept: RADIATION ONCOLOGY | Facility: HOSPITAL | Age: 79
Discharge: HOME | End: 2024-11-01
Attending: RADIOLOGY
Payer: MEDICARE

## 2024-11-01 PROCEDURE — 77336 RADIATION PHYSICS CONSULT: CPT | Performed by: RADIOLOGY

## 2024-11-01 PROCEDURE — 77385 HC IMRT SIMPLE: CPT | Performed by: RADIOLOGY

## 2024-11-04 PROCEDURE — 77385 HC IMRT SIMPLE: CPT | Performed by: RADIOLOGY

## 2024-11-05 PROCEDURE — 77385 HC IMRT SIMPLE: CPT | Performed by: RADIOLOGY

## 2024-11-06 PROCEDURE — 77385 HC IMRT SIMPLE: CPT | Performed by: RADIOLOGY

## 2024-11-06 NOTE — PROGRESS NOTES
[facility] Radiation Treatment Management Note 16-20    Patient:  Mark Olivarez  Age:  79 year old  Visit Diagnosis:  No diagnosis found.  Primary Rad/Onc:  Dr. Abdi Bennett    Site Delivered Dose (cGy) Prescribed Dose (cGy) Fraction #   PROSTATE 4750 7000 19/28     First treatment date:   10/14/24  Concurrent chemotherapy:  N/A        10/31/2024    10:43 AM 10/31/2024    10:55 AM 11/7/2024    10:30 AM   Oncology Vitals   Weight 173 lb 12.8 oz  174 lb 6.4 oz   Weight 78.835 kg  79.107 kg   BSA (m2) 1.86 m2  1.87 m2   BMI 28.92 kg/m2  29.02 kg/m2   BP  149/71 149/84   Pulse  56 59   Resp  18 20   Temp  98.2 °F (36.8 °C) 97.8 °F (36.6 °C)   SpO2  98 % 98 %   Pain Score  0 0        Toxicities:  Fatigue Grade 1= Fatigue relieved by rest  Constipation Grade 0= None  Diarrhea  Grade 0= None  Dysuria on urination Grade 0= None   Urgency on urination Grade 1= Present  Frequency on urinationGrade 1= Present  Urine stream strength Weak  Urine Incontinence Grade 1= Occasional (e.g., with coughing, sneezing, etc.), pads not indicated  Nocturia Grade 1= Present X 4 nightly     Nursing Note:  C/O increased frequency, urgency and occ dribbling with urination.  No hematuria or burning sensation. On Flomax 0.4mg BID.  States having occ bowel urgency when urinating.  Having occ constipation with mild rectal irritation right after, no bleeding.  Instructed on sitz baths and Barrier Cream application.  Also has a URI for last few days, no fever.    Cely GONZALEZ, RN    Physician Note:  Subjective:    LUTS on Flomax BID  Objective:  NAD      Treatment setup imaging have been reviewed:  Yes    Assessment/Plan:      On Flomax BID  Pt on PPI per pt report in AM  Add ibuprofen OTC TID for LUTS    Sitz baths for perineum  Barrier cream    Continue radiotherapy per plan    Next visit:  1 week  Kaleb Navarro MD for   Dr. Abdi Bennett

## 2024-11-07 ENCOUNTER — HOSPITAL ENCOUNTER (OUTPATIENT)
Dept: RADIATION ONCOLOGY | Facility: HOSPITAL | Age: 79
Discharge: HOME | End: 2024-11-07
Attending: RADIOLOGY
Payer: MEDICARE

## 2024-11-07 VITALS
BODY MASS INDEX: 29 KG/M2 | SYSTOLIC BLOOD PRESSURE: 149 MMHG | WEIGHT: 174.38 LBS | OXYGEN SATURATION: 98 % | TEMPERATURE: 98 F | DIASTOLIC BLOOD PRESSURE: 84 MMHG | RESPIRATION RATE: 20 BRPM | HEART RATE: 59 BPM

## 2024-11-07 PROCEDURE — 77385 HC IMRT SIMPLE: CPT | Performed by: RADIOLOGY

## 2024-11-08 PROCEDURE — 77385 HC IMRT SIMPLE: CPT | Performed by: RADIOLOGY

## 2024-11-08 PROCEDURE — 77336 RADIATION PHYSICS CONSULT: CPT | Performed by: RADIOLOGY

## 2024-11-11 PROCEDURE — 77385 HC IMRT SIMPLE: CPT | Performed by: RADIOLOGY

## 2024-11-12 PROCEDURE — 77385 HC IMRT SIMPLE: CPT | Performed by: RADIOLOGY

## 2024-11-13 PROCEDURE — 77385 HC IMRT SIMPLE: CPT | Performed by: RADIOLOGY

## 2024-11-13 NOTE — PROGRESS NOTES
[facility] Radiation Treatment Management Note 21-25    Patient:  Mark Olivarez  Age:  79 year old  Visit Diagnosis:    1. Prostate cancer (HCC)      Primary Rad/Onc:  Dr. Abdi Bennett    Site Delivered Dose (cGy) Prescribed Dose (cGy) Fraction #   PROSTATE 6000 7000 24/28     First treatment date:  10/14/24  Concurrent chemotherapy: N/A        10/31/2024    10:55 AM 11/7/2024    10:30 AM 11/14/2024    10:41 AM   Oncology Vitals   Weight  174 lb 6.4 oz 175 lb 8 oz   Weight  79.107 kg 79.606 kg   BSA (m2)  1.87 m2 1.87 m2   BMI  29.02 kg/m2 29.2 kg/m2   /71 149/84 149/75   Pulse 56 59 66   Resp 18 20 18   Temp 98.2 °F (36.8 °C) 97.8 °F (36.6 °C) 97.4 °F (36.3 °C)   SpO2 98 % 98 % 97 %   Pain Score 0 0 0        Toxicities:  Fatigue Grade 2= Fatigue not relieved by rest limiting instrumental ADL  Constipation Grade 0= None  Diarrhea  Grade 0= None  Dysuria on urination Grade 0= None   Urgency on urination Grade 1= Present  Frequency on urinationGrade 1= Present  Urine stream strength Strong  Urine Incontinence Grade 0= None  Nocturia Grade 1= Present X 4 nightly     Nursing Note:  Decreased appetite   Has soft stools with almost every urination  Has urinary urgency  Denies dysuria  Flomax 0.4mg BID  Has dry cough x past couple weeks  No fevers    Caitlin GONZALEZ RN MD NOTE   Reviewed and agree with RN note above.  Setup imaging reviewed in ARIA and approved.    S:  -small freq BMs  -freq urination    O:  -no changes    A/P:  -cont RT    OTV in 1 week    Abdi Bennett MD  Radiation Oncology

## 2024-11-14 ENCOUNTER — HOSPITAL ENCOUNTER (OUTPATIENT)
Dept: RADIATION ONCOLOGY | Facility: HOSPITAL | Age: 79
Discharge: HOME | End: 2024-11-14
Attending: RADIOLOGY
Payer: MEDICARE

## 2024-11-14 VITALS
RESPIRATION RATE: 18 BRPM | DIASTOLIC BLOOD PRESSURE: 75 MMHG | WEIGHT: 175.5 LBS | TEMPERATURE: 97 F | HEART RATE: 66 BPM | BODY MASS INDEX: 29 KG/M2 | SYSTOLIC BLOOD PRESSURE: 149 MMHG | OXYGEN SATURATION: 97 %

## 2024-11-14 DIAGNOSIS — C61 PROSTATE CANCER (HCC): Primary | ICD-10-CM

## 2024-11-14 DIAGNOSIS — I10 ESSENTIAL HYPERTENSION: ICD-10-CM

## 2024-11-14 PROCEDURE — 77385 HC IMRT SIMPLE: CPT | Performed by: RADIOLOGY

## 2024-11-14 PROCEDURE — 77336 RADIATION PHYSICS CONSULT: CPT | Performed by: RADIOLOGY

## 2024-11-14 NOTE — PATIENT INSTRUCTIONS
- WE WILL CALL TO SCHEDULE YOUR FOLLOW-UP APPOINTMENT WITH DR. WELCH IN 6 MONTHS      - CALL [phone] IF YOU HAVE ANY QUESTIONS/CONCERNS REGARDING RADIATION THERAPY

## 2024-11-15 PROCEDURE — 77385 HC IMRT SIMPLE: CPT | Performed by: RADIOLOGY

## 2024-11-15 RX ORDER — AMLODIPINE BESYLATE 5 MG/1
5 TABLET ORAL DAILY
Qty: 90 TABLET | Refills: 0 | Status: SHIPPED | OUTPATIENT
Start: 2024-11-15

## 2024-11-15 RX ORDER — LOSARTAN POTASSIUM AND HYDROCHLOROTHIAZIDE 12.5; 1 MG/1; MG/1
1 TABLET ORAL
Qty: 90 TABLET | Refills: 0 | Status: SHIPPED | OUTPATIENT
Start: 2024-11-15

## 2024-11-18 PROCEDURE — 77385 HC IMRT SIMPLE: CPT | Performed by: RADIOLOGY

## 2024-11-19 PROCEDURE — 77385 HC IMRT SIMPLE: CPT | Performed by: RADIOLOGY

## 2024-11-20 ENCOUNTER — DOCUMENTATION ONLY (OUTPATIENT)
Dept: RADIATION ONCOLOGY | Facility: HOSPITAL | Age: 79
End: 2024-11-20

## 2024-11-20 ENCOUNTER — HOSPITAL ENCOUNTER (OUTPATIENT)
Dept: RADIATION ONCOLOGY | Facility: HOSPITAL | Age: 79
Discharge: HOME | End: 2024-11-20
Attending: RADIOLOGY
Payer: MEDICARE

## 2024-11-20 VITALS
HEART RATE: 57 BPM | BODY MASS INDEX: 29 KG/M2 | DIASTOLIC BLOOD PRESSURE: 70 MMHG | SYSTOLIC BLOOD PRESSURE: 152 MMHG | WEIGHT: 176.19 LBS | OXYGEN SATURATION: 96 % | TEMPERATURE: 98 F | RESPIRATION RATE: 16 BRPM

## 2024-11-20 DIAGNOSIS — C61 PROSTATE CANCER (HCC): Primary | ICD-10-CM

## 2024-11-20 PROCEDURE — 77385 HC IMRT SIMPLE: CPT | Performed by: RADIOLOGY

## 2024-11-20 PROCEDURE — 77336 RADIATION PHYSICS CONSULT: CPT | Performed by: RADIOLOGY

## 2024-11-20 NOTE — PROGRESS NOTES
[facility] Radiation Treatment Management Note 26-30    Patient:  Mark Olivarez  Age:  79 year old  Visit Diagnosis:    1. Prostate cancer (HCC)      Primary Rad/Onc:  Dr. Abdi Bennett    Site Delivered Dose (cGy) Prescribed Dose (cGy) Fraction #   PROSTATE 7000 7000 28/28     First treatment date:   10/14/24  Concurrent chemotherapy:  N/A        11/7/2024    10:30 AM 11/14/2024    10:41 AM 11/20/2024    10:38 AM   Oncology Vitals   Weight 174 lb 6.4 oz 175 lb 8 oz 176 lb 3.2 oz   Weight 79.107 kg 79.606 kg 79.924 kg   BSA (m2) 1.87 m2 1.87 m2 1.87 m2   BMI 29.02 kg/m2 29.2 kg/m2 29.32 kg/m2   /84 149/75 152/70   Pulse 59 66 57   Resp 20 18 16   Temp 97.8 °F (36.6 °C) 97.4 °F (36.3 °C) 97.8 °F (36.6 °C)   SpO2 98 % 97 % 96 %   Pain Score 0 0 0        Toxicities:  Fatigue Grade 1= Fatigue relieved by rest  Constipation Grade 0= None  Diarrhea  Grade 0= None  Dysuria on urination Grade 0= None   Urgency on urination Grade 0= None  Frequency on urinationGrade 1= Present  Urine stream strength Weak  Urine Incontinence Grade 0= None  Nocturia Grade 1= Present X 5 nightly     Nursing Note:  Pt done with RT today, AVS to give.   Still with intermittent anal irritation with BM.  States still having small BM when urinating.  Denies bleeding. Using Barrier Cream PRN with some relief.  Cont to take Flomax 0.4mg BID.    Cely GONZALEZ RN    Physician Note:  Subjective:  Doing well.  COmpleted today.  ++ urinary and bowel frequency, tolerable.  Improved on Flomax BID.      Objective:  Unchanged      Treatment setup imaging have been reviewed:  Yes    Assessment/Plan:    Completed RT    Barrier cream as per RN note above    Next visit:  f/u per Dr Donald Lee

## 2024-11-20 NOTE — PATIENT INSTRUCTIONS
- WE WILL CALL TO SCHEDULE YOU FOR A FOLLOW-UP WITH DR. WELCH IN 3 MONTHS, AFTER YOUR NEXT PSA BLOOD WORK  - FOLLOW-UP WITH DR. LYNCH AFTER RADIATION COMPLETION  - SIDE EFFECTS OF RADIATION WILL GRADUALLY SUBSIDE. IT MAY TAKE 1- 2 WEEKS POST-RADIATION FOR YOU TO NOTICE CHANGES SUCH AS A DECREASE IN YOUR FATIGUE LEVEL, DECREASE IN URINARY SYMPTOMS (FREQUENCY, BURNING SENSATION, HESITATION), DECREASE IN BOWEL SYMPTOMS (LOOSE STOOLS/DIARRHEA, RECTAL IRRITATION).  - CONTINUE TAKING YOUR MEDICATIONS AS DIRECTED.   - CALL THE NURSE LINE AT [phone] IF YOU HAVE ANY QUESTIONS/CONCERNS REGARDING RADIATION THERAPY.

## 2024-11-20 NOTE — PROGRESS NOTES
[facility] RADIATION ONCOLOGY  TREATMENT SUMMARY    PATIENT:  Mark Olivarez    REFERRING MD: JUAN LUIS Solorzano MD  DIAGNOSIS:  Prostate CA    HISTORY:  78 yo with T1c, Pete score 4+3, iPSA 13  About 90 gram gland  6 mm L Pirads 4 lesion on MRI  9 total + cores  PET with multfocal uptake in prostate only  6 mo ADT 8/2024    DOSE DELIVERED:    Prostate   7000 cGy in 28 fractions   6 MV photons with IGRT   VMAT   10/14/2024 to 11/20/2024     COURSE:  Needed to increase Flomax to BID during RT. Did not have diarrhea. Some proctitis.    PLAN:  F/u 6 months  Call if any issues or concerns while urinary and bowel symptoms normalize    Abdi Bennett MD  Radiation Oncology

## 2024-11-26 ENCOUNTER — TELEPHONE (OUTPATIENT)
Dept: INTERNAL MEDICINE CLINIC | Facility: CLINIC | Age: 79
End: 2024-11-26

## 2024-11-26 DIAGNOSIS — Z01.00 EYE EXAM, ROUTINE: Primary | ICD-10-CM

## 2024-11-26 NOTE — TELEPHONE ENCOUNTER
Pt spouse asking for call back with eye doctor recommendations that were provided during last ofv. Pt spouse says it wasn't listed in AVS and she needs to schedule an appt.     I couldn't locate recommendation in chart notes.

## 2024-11-27 ENCOUNTER — APPOINTMENT (OUTPATIENT)
Dept: GENERAL RADIOLOGY | Age: 79
End: 2024-11-27
Attending: EMERGENCY MEDICINE
Payer: MEDICARE

## 2024-11-27 ENCOUNTER — HOSPITAL ENCOUNTER (OUTPATIENT)
Age: 79
Discharge: HOME | End: 2024-11-27
Attending: EMERGENCY MEDICINE
Payer: MEDICARE

## 2024-11-27 VITALS
TEMPERATURE: 99 F | BODY MASS INDEX: 29.57 KG/M2 | WEIGHT: 184 LBS | SYSTOLIC BLOOD PRESSURE: 140 MMHG | HEIGHT: 66 IN | HEART RATE: 83 BPM | DIASTOLIC BLOOD PRESSURE: 72 MMHG | OXYGEN SATURATION: 96 % | RESPIRATION RATE: 18 BRPM

## 2024-11-27 DIAGNOSIS — J98.01 ACUTE BRONCHOSPASM: ICD-10-CM

## 2024-11-27 DIAGNOSIS — J06.9 UPPER RESPIRATORY TRACT INFECTION, UNSPECIFIED TYPE: Primary | ICD-10-CM

## 2024-11-27 LAB — SARS-COV-2 RNA RESP QL NAA+PROBE: NOT DETECTED

## 2024-11-27 PROCEDURE — 99214 OFFICE O/P EST MOD 30 MIN: CPT

## 2024-11-27 PROCEDURE — 94640 AIRWAY INHALATION TREATMENT: CPT

## 2024-11-27 PROCEDURE — 71046 X-RAY EXAM CHEST 2 VIEWS: CPT | Performed by: EMERGENCY MEDICINE

## 2024-11-27 RX ORDER — IPRATROPIUM BROMIDE AND ALBUTEROL SULFATE 2.5; .5 MG/3ML; MG/3ML
3 SOLUTION RESPIRATORY (INHALATION) ONCE
Status: COMPLETED | OUTPATIENT
Start: 2024-11-27 | End: 2024-11-27

## 2024-11-27 RX ORDER — ALBUTEROL SULFATE 90 UG/1
2 INHALANT RESPIRATORY (INHALATION) EVERY 4 HOURS PRN
Qty: 1 EACH | Refills: 0 | Status: SHIPPED | OUTPATIENT
Start: 2024-11-27 | End: 2024-12-27

## 2024-11-27 RX ORDER — BENZONATATE 100 MG/1
100 CAPSULE ORAL 3 TIMES DAILY PRN
Qty: 30 CAPSULE | Refills: 0 | Status: SHIPPED | OUTPATIENT
Start: 2024-11-27 | End: 2024-12-27

## 2024-11-27 NOTE — DISCHARGE INSTRUCTIONS
Chest x-ray is normal, no pneumonia.    Use inhaler, 2 puffs every 4-6 hours as needed for cough or wheezing.  Benzonatate as needed for coughing as well.

## 2024-12-01 ENCOUNTER — TELEPHONE (OUTPATIENT)
Dept: INTERNAL MEDICINE CLINIC | Facility: CLINIC | Age: 79
End: 2024-12-01

## 2024-12-01 RX ORDER — AZITHROMYCIN 250 MG/1
TABLET, FILM COATED ORAL
Qty: 6 TABLET | Refills: 0 | Status: SHIPPED | OUTPATIENT
Start: 2024-12-01 | End: 2024-12-02 | Stop reason: ALTCHOICE

## 2024-12-01 NOTE — TELEPHONE ENCOUNTER
On call attending physician report:    The patient was seen at an urgent care on 11/27, with  shortness of breath, cough and wheezing.  Patient was discharged on albuterol inhaler and cough suppressant.   X-ray did not reveal any infiltrate on 11/27/2024.    The patient is still suffering from persistent cough with mucoid expectoration and generalized weakness.  No fever or chills.     For likely bacterial lower respiratory tract infection, azithromycin is prescribed.  QTc normal.  If any deterioration, the patient needs to go to urgent care or emergency room.    Spoke with the patient's wife mostly and the patient was in the near vicinity.  Blood pressure was 163/84.  Advised to keep blood pressure diary and be adherent to the medications.    Please arrange a follow-up in the clinic.

## 2024-12-02 ENCOUNTER — OFFICE VISIT (OUTPATIENT)
Dept: INTERNAL MEDICINE CLINIC | Facility: CLINIC | Age: 79
End: 2024-12-02
Payer: MEDICARE

## 2024-12-02 VITALS
SYSTOLIC BLOOD PRESSURE: 160 MMHG | WEIGHT: 173 LBS | RESPIRATION RATE: 16 BRPM | DIASTOLIC BLOOD PRESSURE: 74 MMHG | BODY MASS INDEX: 27.8 KG/M2 | HEART RATE: 76 BPM | OXYGEN SATURATION: 97 % | TEMPERATURE: 98 F | HEIGHT: 66 IN

## 2024-12-02 DIAGNOSIS — R05.1 ACUTE COUGH: Primary | ICD-10-CM

## 2024-12-02 PROCEDURE — 3008F BODY MASS INDEX DOCD: CPT | Performed by: INTERNAL MEDICINE

## 2024-12-02 PROCEDURE — 1159F MED LIST DOCD IN RCRD: CPT | Performed by: INTERNAL MEDICINE

## 2024-12-02 PROCEDURE — 3078F DIAST BP <80 MM HG: CPT | Performed by: INTERNAL MEDICINE

## 2024-12-02 PROCEDURE — 99213 OFFICE O/P EST LOW 20 MIN: CPT | Performed by: INTERNAL MEDICINE

## 2024-12-02 PROCEDURE — 3077F SYST BP >= 140 MM HG: CPT | Performed by: INTERNAL MEDICINE

## 2024-12-02 RX ORDER — CODEINE PHOSPHATE AND GUAIFENESIN 10; 100 MG/5ML; MG/5ML
5 SOLUTION ORAL EVERY 6 HOURS PRN
Qty: 118 ML | Refills: 0 | Status: SHIPPED | OUTPATIENT
Start: 2024-12-02 | End: 2024-12-09

## 2024-12-02 RX ORDER — PREDNISONE 5 MG/1
TABLET ORAL
Qty: 33 TABLET | Refills: 0 | Status: SHIPPED | OUTPATIENT
Start: 2024-12-02 | End: 2024-12-14

## 2024-12-02 NOTE — PROGRESS NOTES
Mark Olivarez  1945 is a 79 year old male who presents for upper respiratory symptoms    Chief Complaint   Patient presents with    Cough     Negative covid test           HPI:   Pt reports  respiratory symptoms for few days was in the emergency room was given antibiotics still wheezing.  No fever COVID-negative.     Current Outpatient Medications   Medication Sig Dispense Refill    predniSONE 5 MG Oral Tab Take 3 tablets (15 mg total) by mouth 2 (two) times daily for 3 days, THEN 1 tablet (5 mg total) 2 (two) times daily for 3 days, THEN 1 tablet (5 mg total) 2 (two) times daily for 3 days, THEN 1 tablet (5 mg total) daily for 3 days. 33 tablet 0    guaiFENesin-codeine 100-10 MG/5ML Oral Solution Take 5 mL by mouth every 6 (six) hours as needed for cough. 118 mL 0    albuterol 108 (90 Base) MCG/ACT Inhalation Aero Soln Inhale 2 puffs into the lungs every 4 (four) hours as needed for Wheezing. 1 each 0    benzonatate 100 MG Oral Cap Take 1 capsule (100 mg total) by mouth 3 (three) times daily as needed for cough. 30 capsule 0    LOSARTAN POTASSIUM-HCTZ 100-12.5 MG Oral Tab Take 1 tablet by mouth once daily 90 tablet 0    AMLODIPINE 5 MG Oral Tab Take 1 tablet by mouth once daily 90 tablet 0    POTASSIUM CHLORIDE ER 20 MEQ Oral Tab CR Take 1 tablet by mouth once daily 90 tablet 0    tamsulosin 0.4 MG Oral Cap Take 1 capsule (0.4 mg total) by mouth every evening. 90 capsule 6    aspirin 81 MG Oral Tab Take 1 tablet (81 mg total) by mouth daily.        Past Medical History:    BPH (benign prostatic hyperplasia)    DDD (degenerative disc disease), cervical    DJD (degenerative joint disease), multiple sites    hips, lumbar, cervical    Essential hypertension    High blood pressure    High cholesterol    Hypertension    Incarcerated umbilical hernia    Other and unspecified hyperlipidemia    Primary osteoarthritis of both knees    PVD (peripheral vascular disease) (HCC)    Status post total left knee  replacement    Umbilical hernia    Varicose vein of leg      Social History     Socioeconomic History    Marital status:     Number of children: 1   Occupational History    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   Social History Narrative    - lives with wife    1 son    1 child that passed         REVIEW OF SYSTEMS:   GENERAL: feels well otherwise.Denies fever  SKIN: no rashes  EYES:denies eye pain,discharge   HEENT:neg  LUNGS: denies shortness of breath,,expectoration,chest pain,  CARDIOVASCULAR: denies chest pain on exertion  GI: no nausea or abdominal pain  NEURO: denies headaches    EXAM:   /74 (BP Location: Left arm, Patient Position: Sitting, Cuff Size: adult)   Pulse 76   Temp 98.3 °F (36.8 °C) (Temporal)   Resp 16   Ht 5' 6\" (1.676 m)   Wt 173 lb (78.5 kg)   SpO2 97%   BMI 27.92 kg/m²   GENERAL: well developed, well nourished,in no apparent distress  SKIN: no rashes,no suspicious lesions  EYES:PERRLA, EOMI intact,conjunctiva are clear  ENT: ears neg throat neg  NECK: supple, neg adenopathy,no bruits  LUNGS: clear to auscultation.air entry equal.no chest wall tenderness. wheeze bilateral basal diffuse  CARDIO: RRR without murmur  GI: good BS's,no masses, HSM or tenderness    ASSESSMENT AND PLAN:   Mark was seen today for cough.    Diagnoses and all orders for this visit:    Acute cough  -     predniSONE 5 MG Oral Tab; Take 3 tablets (15 mg total) by mouth 2 (two) times daily for 3 days, THEN 1 tablet (5 mg total) 2 (two) times daily for 3 days, THEN 1 tablet (5 mg  total) 2 (two) times daily for 3 days, THEN 1 tablet (5 mg total) daily for 3 days.  -     guaiFENesin-codeine 100-10 MG/5ML Oral Solution; Take 5 mL by mouth every 6 (six) hours as needed for cough.        Patient Instructions   Continue albuterol.    The patient indicates understanding of these issues and agrees to the plan.  The patient is asked to Return in about 1 week (around 12/9/2024), or f/u..      Mike Buchanan MD

## 2024-12-05 ENCOUNTER — TELEPHONE (OUTPATIENT)
Dept: INTERNAL MEDICINE CLINIC | Facility: CLINIC | Age: 79
End: 2024-12-05

## 2024-12-05 NOTE — TELEPHONE ENCOUNTER
PT is calling for a follow up with VM. Was supposed to be for 12/09.  does not have any openings. PT wants to know if VM is okay to squeeze him in?

## 2024-12-06 NOTE — TELEPHONE ENCOUNTER
Spoke with patient to schedule for 12/10 at 2:45PM for follow-up.    VM: Pt scheduled for 12/10 at 2:45PM. Pt requesting if he can go back to work on Monday. He's feeling much better, but would like to get your approval. Please advise, TY!    Future Appointments   Date Time Provider Department Center   12/10/2024  2:45 PM Mike Buchanan MD [facility] [place]   2/25/2025 10:00 AM Abdi Bennett MD  RAD ONC [facility]

## 2024-12-09 NOTE — TELEPHONE ENCOUNTER
Spoke with patient, he states that he does not need a work note. He will come in tomorrow at 2:45 for follow-up.

## 2024-12-10 ENCOUNTER — OFFICE VISIT (OUTPATIENT)
Dept: INTERNAL MEDICINE CLINIC | Facility: CLINIC | Age: 79
End: 2024-12-10
Payer: MEDICARE

## 2024-12-10 VITALS
TEMPERATURE: 98 F | HEART RATE: 69 BPM | WEIGHT: 171 LBS | OXYGEN SATURATION: 98 % | HEIGHT: 66 IN | RESPIRATION RATE: 16 BRPM | BODY MASS INDEX: 27.48 KG/M2 | SYSTOLIC BLOOD PRESSURE: 130 MMHG | DIASTOLIC BLOOD PRESSURE: 70 MMHG

## 2024-12-10 DIAGNOSIS — R05.1 ACUTE COUGH: Primary | ICD-10-CM

## 2024-12-10 PROCEDURE — 3078F DIAST BP <80 MM HG: CPT | Performed by: INTERNAL MEDICINE

## 2024-12-10 PROCEDURE — 3008F BODY MASS INDEX DOCD: CPT | Performed by: INTERNAL MEDICINE

## 2024-12-10 PROCEDURE — 99213 OFFICE O/P EST LOW 20 MIN: CPT | Performed by: INTERNAL MEDICINE

## 2024-12-10 PROCEDURE — 3075F SYST BP GE 130 - 139MM HG: CPT | Performed by: INTERNAL MEDICINE

## 2024-12-10 PROCEDURE — 1159F MED LIST DOCD IN RCRD: CPT | Performed by: INTERNAL MEDICINE

## 2024-12-10 NOTE — PROGRESS NOTES
Mark Olivarez  1945 is a 79 year old male who presents for upper respiratory symptoms    Chief Complaint   Patient presents with    Cough         HPI:     100% better  Current Outpatient Medications   Medication Sig Dispense Refill    albuterol 108 (90 Base) MCG/ACT Inhalation Aero Soln Inhale 2 puffs into the lungs every 4 (four) hours as needed for Wheezing. 1 each 0    AMLODIPINE 5 MG Oral Tab Take 1 tablet by mouth once daily 90 tablet 0    aspirin 81 MG Oral Tab Take 1 tablet (81 mg total) by mouth daily.      predniSONE 5 MG Oral Tab Take 3 tablets (15 mg total) by mouth 2 (two) times daily for 3 days, THEN 1 tablet (5 mg total) 2 (two) times daily for 3 days, THEN 1 tablet (5 mg total) 2 (two) times daily for 3 days, THEN 1 tablet (5 mg total) daily for 3 days. 33 tablet 0    LOSARTAN POTASSIUM-HCTZ 100-12.5 MG Oral Tab Take 1 tablet by mouth once daily 90 tablet 0    POTASSIUM CHLORIDE ER 20 MEQ Oral Tab CR Take 1 tablet by mouth once daily 90 tablet 0    tamsulosin 0.4 MG Oral Cap Take 1 capsule (0.4 mg total) by mouth every evening. 90 capsule 6      Past Medical History:    BPH (benign prostatic hyperplasia)    DDD (degenerative disc disease), cervical    DJD (degenerative joint disease), multiple sites    hips, lumbar, cervical    Essential hypertension    High blood pressure    High cholesterol    Hypertension    Incarcerated umbilical hernia    Other and unspecified hyperlipidemia    Primary osteoarthritis of both knees    PVD (peripheral vascular disease) (HCC)    Status post total left knee replacement    Umbilical hernia    Varicose vein of leg      Social History     Socioeconomic History    Marital status:     Number of children: 1   Occupational History    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   Social History Narrative    - lives with wife    1 son    1 child that passed         REVIEW OF SYSTEMS:   GENERAL: feels well otherwise.Denies fever  SKIN: no rashes  EYES:denies eye pain,discharge   HEENT:neg  LUNGS: denies shortness of breath,,expectoration,chest pain,  CARDIOVASCULAR: denies chest pain on exertion  GI: no nausea or abdominal pain  NEURO: denies headaches    EXAM:   /70   Pulse 69   Temp 97.9 °F (36.6 °C) (Temporal)   Resp 16   Ht 5' 6\" (1.676 m)   Wt 171 lb (77.6 kg)   SpO2 98%   BMI 27.60 kg/m²   GENERAL: well developed, well nourished,in no apparent distress  SKIN: no rashes,no suspicious lesions  EYES:PERRLA, EOMI intact,conjunctiva are clear  ENT: ears neg throat neg  NECK: supple, neg adenopathy,no bruits  LUNGS: clear to auscultation.air entry equal.no chest wall tenderness.   CARDIO: RRR without murmur  GI: good BS's,no masses, HSM or tenderness    ASSESSMENT AND PLAN:   Mark was seen today for cough.    Diagnoses and all orders for this visit:    Acute cough      Much better finish medicines    There are no Patient Instructions on file for this visit.    The patient indicates understanding of these issues and agrees to the plan.  The patient is asked to Return if symptoms worsen or fail to improve..      Mike Buchanan MD

## 2024-12-18 DIAGNOSIS — R05.1 ACUTE COUGH: ICD-10-CM

## 2024-12-18 RX ORDER — PREDNISONE 5 MG/1
TABLET ORAL
Qty: 33 TABLET | Refills: 0 | Status: SHIPPED | OUTPATIENT
Start: 2024-12-18 | End: 2024-12-19

## 2024-12-19 ENCOUNTER — TELEPHONE (OUTPATIENT)
Dept: INTERNAL MEDICINE CLINIC | Facility: CLINIC | Age: 79
End: 2024-12-19

## 2024-12-19 RX ORDER — PREDNISONE 5 MG/1
TABLET ORAL
Qty: 33 TABLET | Refills: 0 | Status: SHIPPED | OUTPATIENT
Start: 2024-12-19 | End: 2024-12-31

## 2024-12-19 NOTE — TELEPHONE ENCOUNTER
[facility] pharmacy requesting call back to discuss medication directions, needing clarification.     predniSONE 5 MG Oral Tab 33 tablet

## 2025-01-02 ENCOUNTER — OFFICE VISIT (OUTPATIENT)
Dept: INTERNAL MEDICINE CLINIC | Facility: CLINIC | Age: 80
End: 2025-01-02
Payer: MEDICARE

## 2025-01-02 VITALS
HEIGHT: 66 IN | SYSTOLIC BLOOD PRESSURE: 130 MMHG | RESPIRATION RATE: 16 BRPM | OXYGEN SATURATION: 96 % | DIASTOLIC BLOOD PRESSURE: 64 MMHG | WEIGHT: 171 LBS | TEMPERATURE: 99 F | BODY MASS INDEX: 27.48 KG/M2 | HEART RATE: 80 BPM

## 2025-01-02 DIAGNOSIS — J98.01 BRONCHOSPASM: Primary | ICD-10-CM

## 2025-01-02 PROCEDURE — 1160F RVW MEDS BY RX/DR IN RCRD: CPT | Performed by: INTERNAL MEDICINE

## 2025-01-02 PROCEDURE — 99213 OFFICE O/P EST LOW 20 MIN: CPT | Performed by: INTERNAL MEDICINE

## 2025-01-02 PROCEDURE — 1159F MED LIST DOCD IN RCRD: CPT | Performed by: INTERNAL MEDICINE

## 2025-01-02 PROCEDURE — 3075F SYST BP GE 130 - 139MM HG: CPT | Performed by: INTERNAL MEDICINE

## 2025-01-02 PROCEDURE — 3078F DIAST BP <80 MM HG: CPT | Performed by: INTERNAL MEDICINE

## 2025-01-02 PROCEDURE — 3008F BODY MASS INDEX DOCD: CPT | Performed by: INTERNAL MEDICINE

## 2025-01-02 RX ORDER — PREDNISONE 5 MG/1
TABLET ORAL
Qty: 39 TABLET | Refills: 0 | Status: SHIPPED | OUTPATIENT
Start: 2025-01-02 | End: 2025-01-14

## 2025-01-02 RX ORDER — ALBUTEROL SULFATE 90 UG/1
2 INHALANT RESPIRATORY (INHALATION) EVERY 6 HOURS PRN
Qty: 1 EACH | Refills: 0 | Status: SHIPPED | OUTPATIENT
Start: 2025-01-02 | End: 2025-02-01

## 2025-01-02 RX ORDER — PREDNISONE 5 MG/1
TABLET ORAL
COMMUNITY
Start: 2024-12-26 | End: 2025-01-02

## 2025-01-02 RX ORDER — SULFAMETHOXAZOLE AND TRIMETHOPRIM 800; 160 MG/1; MG/1
1 TABLET ORAL 2 TIMES DAILY
Qty: 20 TABLET | Refills: 0 | Status: SHIPPED | OUTPATIENT
Start: 2025-01-02 | End: 2025-01-12

## 2025-01-02 NOTE — PROGRESS NOTES
Mark Olivarez  1945 is a 79 year old male who presents for upper respiratory symptoms    Chief Complaint   Patient presents with    Cough         HPI:   Pt reports  respiratory symptoms for few days.  Was in the emergency room on  now has a reoccurrence.  Does have some cough wheezing and yellowish expectoration.  No fever COVID-negative..     Current Outpatient Medications   Medication Sig Dispense Refill    sulfamethoxazole-trimethoprim -160 MG Oral Tab per tablet Take 1 tablet by mouth 2 (two) times daily for 10 days. 20 tablet 0    predniSONE 5 MG Oral Tab Take 3 tablets (15 mg total) by mouth 2 (two) times daily for 3 days, THEN 2 tablets (10 mg total) 2 (two) times daily for 3 days, THEN 1 tablet (5 mg total) 2 (two) times daily for 3 days, THEN 1 tablet (5 mg total) daily for 3 days. 39 tablet 0    albuterol 108 (90 Base) MCG/ACT Inhalation Aero Soln Inhale 2 puffs into the lungs every 6 (six) hours as needed for Wheezing. 1 each 0    LOSARTAN POTASSIUM-HCTZ 100-12.5 MG Oral Tab Take 1 tablet by mouth once daily 90 tablet 0    AMLODIPINE 5 MG Oral Tab Take 1 tablet by mouth once daily 90 tablet 0    POTASSIUM CHLORIDE ER 20 MEQ Oral Tab CR Take 1 tablet by mouth once daily 90 tablet 0    tamsulosin 0.4 MG Oral Cap Take 1 capsule (0.4 mg total) by mouth every evening. 90 capsule 6    aspirin 81 MG Oral Tab Take 1 tablet (81 mg total) by mouth daily.        Past Medical History:    BPH (benign prostatic hyperplasia)    DDD (degenerative disc disease), cervical    DJD (degenerative joint disease), multiple sites    hips, lumbar, cervical    Essential hypertension    High blood pressure    High cholesterol    Hypertension    Incarcerated umbilical hernia    Other and unspecified hyperlipidemia    Primary osteoarthritis of both knees    PVD (peripheral vascular disease) (HCC)    Status post total left knee replacement    Umbilical hernia    Varicose vein of leg      Social History      Socioeconomic History    Marital status:     Number of children: 1   Occupational History    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   Social History Narrative    - lives with wife    1 son    1 child that passed         REVIEW OF SYSTEMS:   GENERAL: feels well otherwise.Denies fever  SKIN: no rashes  EYES:denies eye pain,discharge   HEENT:neg  LUNGS: denies shortness of breath,chest pain,  CARDIOVASCULAR: denies chest pain on exertion  GI: no nausea or abdominal pain  NEURO: denies headaches    EXAM:   /64   Pulse 80   Temp 98.7 °F (37.1 °C) (Temporal)   Resp 16   Ht 5' 6\" (1.676 m)   Wt 171 lb (77.6 kg)   SpO2 96%   BMI 27.60 kg/m²   GENERAL: well developed, well nourished,in no apparent distress  SKIN: no rashes,no suspicious lesions  EYES:PERRLA, EOMI intact,conjunctiva are clear  ENT: ears neg throat neg  NECK: supple, neg adenopathy,no bruits  LUNGS: clear to auscultation.air entry equal.no chest wall tenderness. wheeze bibasal wheeze expiratory  CARDIO: RRR without murmur  GI: good BS's,no masses, HSM or tenderness    ASSESSMENT AND PLAN:   Mark was seen today for cough.    Diagnoses and all orders for this visit:    Bronchospasm    Other orders  -     sulfamethoxazole-trimethoprim -160 MG Oral Tab per tablet; Take 1 tablet by mouth 2 (two) times daily for 10 days.  -     predniSONE 5 MG Oral Tab; Take 3 tablets (15 mg total) by mouth 2 (two) times daily for 3 days, THEN 2 tablets (10 mg total) 2 (two) times daily for 3 days, THEN 1 tablet (5 mg total) 2  (two) times daily for 3 days, THEN 1 tablet (5 mg total) daily for 3 days.  -     albuterol 108 (90 Base) MCG/ACT Inhalation Aero Soln; Inhale 2 puffs into the lungs every 6 (six) hours as needed for Wheezing.        Patient Instructions   Plenty of fluids.  See me in 1 week with all meds and hand    The patient indicates understanding of these issues and agrees to the plan.  The patient is asked to Return in about 1 week (around 1/9/2025), or f/ u..      Mike Buchanan MD

## 2025-01-09 ENCOUNTER — OFFICE VISIT (OUTPATIENT)
Dept: INTERNAL MEDICINE CLINIC | Facility: CLINIC | Age: 80
End: 2025-01-09
Payer: MEDICARE

## 2025-01-09 VITALS
WEIGHT: 167 LBS | HEIGHT: 66 IN | BODY MASS INDEX: 26.84 KG/M2 | SYSTOLIC BLOOD PRESSURE: 130 MMHG | DIASTOLIC BLOOD PRESSURE: 64 MMHG | OXYGEN SATURATION: 97 % | HEART RATE: 50 BPM | TEMPERATURE: 97 F | RESPIRATION RATE: 16 BRPM

## 2025-01-09 DIAGNOSIS — Z00.00 LABORATORY EXAMINATION ORDERED AS PART OF A ROUTINE GENERAL MEDICAL EXAMINATION: ICD-10-CM

## 2025-01-09 DIAGNOSIS — R05.1 ACUTE COUGH: Primary | ICD-10-CM

## 2025-01-09 NOTE — PROGRESS NOTES
Mark Olivarez  1945 is a 79 year old male who presents for upper respiratory symptoms    Chief Complaint   Patient presents with    Follow - Up     cough         HPI:   Here much improved failed to bring his medicines and    Current Outpatient Medications   Medication Sig Dispense Refill    sulfamethoxazole-trimethoprim -160 MG Oral Tab per tablet Take 1 tablet by mouth 2 (two) times daily for 10 days. 20 tablet 0    predniSONE 5 MG Oral Tab Take 3 tablets (15 mg total) by mouth 2 (two) times daily for 3 days, THEN 2 tablets (10 mg total) 2 (two) times daily for 3 days, THEN 1 tablet (5 mg total) 2 (two) times daily for 3 days, THEN 1 tablet (5 mg total) daily for 3 days. 39 tablet 0    albuterol 108 (90 Base) MCG/ACT Inhalation Aero Soln Inhale 2 puffs into the lungs every 6 (six) hours as needed for Wheezing. 1 each 0    LOSARTAN POTASSIUM-HCTZ 100-12.5 MG Oral Tab Take 1 tablet by mouth once daily 90 tablet 0    AMLODIPINE 5 MG Oral Tab Take 1 tablet by mouth once daily 90 tablet 0    POTASSIUM CHLORIDE ER 20 MEQ Oral Tab CR Take 1 tablet by mouth once daily 90 tablet 0    tamsulosin 0.4 MG Oral Cap Take 1 capsule (0.4 mg total) by mouth every evening. 90 capsule 6    aspirin 81 MG Oral Tab Take 1 tablet (81 mg total) by mouth daily.        Past Medical History:    BPH (benign prostatic hyperplasia)    DDD (degenerative disc disease), cervical    DJD (degenerative joint disease), multiple sites    hips, lumbar, cervical    Essential hypertension    High blood pressure    High cholesterol    Hypertension    Incarcerated umbilical hernia    Other and unspecified hyperlipidemia    Primary osteoarthritis of both knees    PVD (peripheral vascular disease) (HCC)    Status post total left knee replacement    Umbilical hernia    Varicose vein of leg      Social History     Socioeconomic History    Marital status:     Number of children: 1   Occupational History    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   Social History Narrative    - lives with wife    1 son    1 child that passed         REVIEW OF SYSTEMS:   GENERAL: feels well otherwise.Denies fever  SKIN: no rashes  EYES:denies eye pain,discharge   HEENT:neg  LUNGS: denies shortness of breath,chest pain,  CARDIOVASCULAR: denies chest pain on exertion  GI: no nausea or abdominal pain  NEURO: denies headaches    EXAM:   Temp 97.1 °F (36.2 °C) (Temporal)   Resp 16   Ht 5' 6\" (1.676 m)   Wt 167 lb (75.8 kg)   BMI 26.95 kg/m²   GENERAL: well developed, well nourished,in no apparent distress  SKIN: no rashes,no suspicious lesions  EYES:PERRLA, EOMI intact,conjunctiva are clear  ENT: ears neg throat neg  NECK: supple, neg adenopathy,no bruits  LUNGS: clear to auscultation.air entry equal.no chest wall tenderness. wheeze neg  CARDIO: RRR without murmur  GI: good BS's,no masses, HSM or tenderness    ASSESSMENT AND PLAN:   Mark was seen today for follow - up.    Diagnoses and all orders for this visit:    Acute cough    Laboratory examination ordered as part of a routine general medical examination  -     Assay, Thyroid Stim Hormone  -     Hemoglobin A1C  -     Lipid Panel  -     Urinalysis, Routine        Patient Instructions   Patient medicines much improved    The patient indicates understanding of these issues and agrees to the plan.  The patient is asked to Return in about 4 weeks (around 2/6/2025), or cpx..      Mike Buchanan MD

## 2025-01-23 ENCOUNTER — TELEPHONE (OUTPATIENT)
Dept: INTERNAL MEDICINE CLINIC | Facility: CLINIC | Age: 80
End: 2025-01-23

## 2025-01-28 RX ORDER — PANTOPRAZOLE SODIUM 40 MG/1
TABLET, DELAYED RELEASE ORAL
Qty: 90 TABLET | Refills: 0 | OUTPATIENT
Start: 2025-01-28

## 2025-02-17 RX ORDER — AMLODIPINE BESYLATE 5 MG/1
5 TABLET ORAL DAILY
Qty: 90 TABLET | Refills: 0 | Status: SHIPPED | OUTPATIENT
Start: 2025-02-17

## 2025-02-17 RX ORDER — PANTOPRAZOLE SODIUM 40 MG/1
TABLET, DELAYED RELEASE ORAL
Qty: 90 TABLET | Refills: 0 | OUTPATIENT
Start: 2025-02-17

## 2025-02-17 NOTE — TELEPHONE ENCOUNTER
Name from pharmacy: amLODIPine Besylate 5 MG Oral Tablet         Will file in chart as: AMLODIPINE 5 MG Oral Tab    Sig: Take 1 tablet by mouth once daily    Disp: 90 tablet    Refills: 0    Start: 2/14/2025    Class: Normal    Non-formulary    Last ordered: 3 months ago (11/15/2024) by Mike Buchanan MD    Last refill: 5/8/2024    Rx #: [id number]    Hypertension Medications Protocol [id number]/14/2025 11:46 AM   Protocol Details CMP or BMP in past 12 months    Last BP reading less than 140/90    In person appointment or virtual visit in the past 12 mos or appointment in next 3 mos    EGFRCR or GFRNAA > 50    Medication is active on med list      To be filled at: [facility] [address] [place] [address] [phone], [phone]

## 2025-02-18 ENCOUNTER — LAB ENCOUNTER (OUTPATIENT)
Dept: LAB | Age: 80
End: 2025-02-18
Attending: INTERNAL MEDICINE
Payer: MEDICARE

## 2025-02-18 DIAGNOSIS — C61 PROSTATE CANCER (HCC): ICD-10-CM

## 2025-02-18 LAB
ALBUMIN SERPL-MCNC: 4.6 G/DL (ref 3.2–4.8)
ALBUMIN/GLOB SERPL: 1.8 {RATIO} (ref 1–2)
ALP LIVER SERPL-CCNC: 92 U/L
ALT SERPL-CCNC: 25 U/L
ANION GAP SERPL CALC-SCNC: 6 MMOL/L (ref 0–18)
AST SERPL-CCNC: 18 U/L (ref ?–34)
BASOPHILS # BLD AUTO: 0.01 X10(3) UL (ref 0–0.2)
BASOPHILS NFR BLD AUTO: 0.2 %
BILIRUB SERPL-MCNC: 0.9 MG/DL (ref 0.2–1.1)
BILIRUB UR QL STRIP.AUTO: NEGATIVE
BUN BLD-MCNC: 20 MG/DL (ref 9–23)
CALCIUM BLD-MCNC: 10.3 MG/DL (ref 8.7–10.6)
CHLORIDE SERPL-SCNC: 104 MMOL/L (ref 98–112)
CHOLEST SERPL-MCNC: 232 MG/DL (ref ?–200)
CLARITY UR REFRACT.AUTO: CLEAR
CO2 SERPL-SCNC: 31 MMOL/L (ref 21–32)
CREAT BLD-MCNC: 0.96 MG/DL
EGFRCR SERPLBLD CKD-EPI 2021: 81 ML/MIN/1.73M2 (ref 60–?)
EOSINOPHIL # BLD AUTO: 0.66 X10(3) UL (ref 0–0.7)
EOSINOPHIL NFR BLD AUTO: 11.4 %
ERYTHROCYTE [DISTWIDTH] IN BLOOD BY AUTOMATED COUNT: 14.4 %
EST. AVERAGE GLUCOSE BLD GHB EST-MCNC: 123 MG/DL (ref 68–126)
FASTING PATIENT LIPID ANSWER: YES
FASTING STATUS PATIENT QL REPORTED: YES
GLOBULIN PLAS-MCNC: 2.6 G/DL (ref 2–3.5)
GLUCOSE BLD-MCNC: 100 MG/DL (ref 70–99)
GLUCOSE UR STRIP.AUTO-MCNC: NORMAL MG/DL
HBA1C MFR BLD: 5.9 % (ref ?–5.7)
HCT VFR BLD AUTO: 40.3 %
HDLC SERPL-MCNC: 75 MG/DL (ref 40–59)
HGB BLD-MCNC: 13.7 G/DL
IMM GRANULOCYTES # BLD AUTO: 0.04 X10(3) UL (ref 0–1)
IMM GRANULOCYTES NFR BLD: 0.7 %
KETONES UR STRIP.AUTO-MCNC: NEGATIVE MG/DL
LDLC SERPL CALC-MCNC: 144 MG/DL (ref ?–100)
LEUKOCYTE ESTERASE UR QL STRIP.AUTO: NEGATIVE
LYMPHOCYTES # BLD AUTO: 0.75 X10(3) UL (ref 1–4)
LYMPHOCYTES NFR BLD AUTO: 13 %
MCH RBC QN AUTO: 30.6 PG (ref 26–34)
MCHC RBC AUTO-ENTMCNC: 34 G/DL (ref 31–37)
MCV RBC AUTO: 90 FL
MONOCYTES # BLD AUTO: 0.43 X10(3) UL (ref 0.1–1)
MONOCYTES NFR BLD AUTO: 7.4 %
NEUTROPHILS # BLD AUTO: 3.9 X10 (3) UL (ref 1.5–7.7)
NEUTROPHILS # BLD AUTO: 3.9 X10(3) UL (ref 1.5–7.7)
NEUTROPHILS NFR BLD AUTO: 67.3 %
NITRITE UR QL STRIP.AUTO: NEGATIVE
NONHDLC SERPL-MCNC: 157 MG/DL (ref ?–130)
OSMOLALITY SERPL CALC.SUM OF ELEC: 295 MOSM/KG (ref 275–295)
PH UR STRIP.AUTO: 5.5 [PH] (ref 5–8)
PLATELET # BLD AUTO: 176 10(3)UL (ref 150–450)
POTASSIUM SERPL-SCNC: 4.2 MMOL/L (ref 3.5–5.1)
PROT SERPL-MCNC: 7.2 G/DL (ref 5.7–8.2)
PROT UR STRIP.AUTO-MCNC: NEGATIVE MG/DL
PSA SERPL-MCNC: 0.05 NG/ML (ref ?–4)
RBC # BLD AUTO: 4.48 X10(6)UL
RBC UR QL AUTO: NEGATIVE
SODIUM SERPL-SCNC: 141 MMOL/L (ref 136–145)
SP GR UR STRIP.AUTO: 1.01 (ref 1–1.03)
TRIGL SERPL-MCNC: 76 MG/DL (ref 30–149)
TSI SER-ACNC: 1.64 UIU/ML (ref 0.55–4.78)
UROBILINOGEN UR STRIP.AUTO-MCNC: NORMAL MG/DL
VLDLC SERPL CALC-MCNC: 14 MG/DL (ref 0–30)
WBC # BLD AUTO: 5.8 X10(3) UL (ref 4–11)

## 2025-02-18 PROCEDURE — 80061 LIPID PANEL: CPT | Performed by: INTERNAL MEDICINE

## 2025-02-18 PROCEDURE — 84443 ASSAY THYROID STIM HORMONE: CPT | Performed by: INTERNAL MEDICINE

## 2025-02-18 PROCEDURE — 83036 HEMOGLOBIN GLYCOSYLATED A1C: CPT | Performed by: INTERNAL MEDICINE

## 2025-02-18 PROCEDURE — 85025 COMPLETE CBC W/AUTO DIFF WBC: CPT

## 2025-02-18 PROCEDURE — 81003 URINALYSIS AUTO W/O SCOPE: CPT | Performed by: INTERNAL MEDICINE

## 2025-02-18 PROCEDURE — 80053 COMPREHEN METABOLIC PANEL: CPT

## 2025-02-18 PROCEDURE — 84153 ASSAY OF PSA TOTAL: CPT

## 2025-02-18 PROCEDURE — 36415 COLL VENOUS BLD VENIPUNCTURE: CPT

## 2025-02-20 ENCOUNTER — TELEPHONE (OUTPATIENT)
Dept: INTERNAL MEDICINE CLINIC | Facility: CLINIC | Age: 80
End: 2025-02-20

## 2025-02-20 NOTE — TELEPHONE ENCOUNTER
Left message for patient to call back.    Dr. Buchanan hasn't reviewed labs yet. No other sooner openings for Thursday until 3/13, would recommend keeping it unless he'd like to f/u on a Monday or Tuesday.

## 2025-02-20 NOTE — TELEPHONE ENCOUNTER
Pt is wanting to schedule a f/u on lab results w/ VM. Pt had labs done on 2/18 and scheduled for 3/13. Pt wants to know if he can be seen sooner on a Thursday (anytime)?

## 2025-02-21 NOTE — TELEPHONE ENCOUNTER
Pt rescheduled for CPX with Dr. Buchanan    VM: TJ    Future Appointments   Date Time Provider Department Center   2/25/2025 10:00 AM Abdi Bennett MD  RAD [facility]   3/13/2025  3:00 PM Mike Buchanan MD [facility]

## 2025-02-24 DIAGNOSIS — I10 ESSENTIAL HYPERTENSION: ICD-10-CM

## 2025-02-24 NOTE — PROGRESS NOTES
Nursing Follow-Up Note    Patient: Mark Olivarez  YOB: 1945  Age: 79 year old  Radiation Oncologist: Dr. Abdi Bennett  Referring Physician: Abdi Bennett  Chief Complaint:   Chief Complaint   Patient presents with    Follow - Up     Date: 2/24/2025    Toxicities: n/a    Vital Signs: BP (!) 174/67 (BP Location: Right arm, Patient Position: Sitting, Cuff Size: adult)   Pulse 59   Temp 97.2 °F (36.2 °C) (Tympanic)   Resp 18   Wt 76.7 kg (169 lb 3.2 oz)   SpO2 97%   BMI 27.31 kg/m² ,   Wt Readings from Last 6 Encounters:   02/25/25 76.7 kg (169 lb 3.2 oz)   01/09/25 75.8 kg (167 lb)   01/02/25 77.6 kg (171 lb)   12/10/24 77.6 kg (171 lb)   12/02/24 78.5 kg (173 lb)   11/27/24 83.5 kg (184 lb)       Allergies:  Allergies[1]    Nursing Note: Hx of unfavorable intermediate risk prostate cancer. Completed RT to prostate on 11/20/24. Last ADT 8/29/24. Here for follow up today. Pt states he continues to feel weak and tired. Has urinary frequency and urgency. Denies dysuria or hematuria. Denies constipation or diarrhea. Denies blood in stool. AUA 8. DAYTON 1. Pt is unsure if he's taking Flomax.       PSA:    Lab Results   Component Value Date    PSA 0.05 02/18/2025    PSA 10.40 (H) 03/09/2023    PSA 9.96 (H) 07/01/2021    PSA 6.84 (H) 09/02/2020    PSA 8.11 (H) 02/07/2020    PSA 8.51 (H) 01/31/2019       PSA Screen:    Lab Results   Component Value Date    PSAS 13.50 (H) 02/22/2024    PSAS 5.05 (H) 10/06/2017                [1]   Allergies  Allergen Reactions    Ciprofloxacin HYPOTENSION, OTHER (SEE COMMENTS) and NAUSEA ONLY    Pcn [Penicillins] HIVES

## 2025-02-25 ENCOUNTER — HOSPITAL ENCOUNTER (OUTPATIENT)
Dept: RADIATION ONCOLOGY | Facility: HOSPITAL | Age: 80
Discharge: HOME | End: 2025-02-25
Attending: RADIOLOGY
Payer: MEDICARE

## 2025-02-25 VITALS
DIASTOLIC BLOOD PRESSURE: 67 MMHG | HEART RATE: 59 BPM | TEMPERATURE: 97 F | OXYGEN SATURATION: 97 % | WEIGHT: 169.19 LBS | BODY MASS INDEX: 27 KG/M2 | RESPIRATION RATE: 18 BRPM | SYSTOLIC BLOOD PRESSURE: 174 MMHG

## 2025-02-25 DIAGNOSIS — C61 PROSTATE CANCER (HCC): Primary | ICD-10-CM

## 2025-02-25 PROCEDURE — 99213 OFFICE O/P EST LOW 20 MIN: CPT

## 2025-02-25 RX ORDER — LOSARTAN POTASSIUM AND HYDROCHLOROTHIAZIDE 12.5; 1 MG/1; MG/1
1 TABLET ORAL
Qty: 90 TABLET | Refills: 0 | Status: SHIPPED | OUTPATIENT
Start: 2025-02-25

## 2025-02-25 NOTE — PATIENT INSTRUCTIONS
- WE WILL CALL TO SCHEDULE YOUR FOLLOW-UP APPOINTMENT WITH DR. WELCH IN ONE YEAR    - FOLLOW UP WITH DR. LYNCH IN 6 MONTHS FOR PSA      - CALL [phone] IF YOU HAVE ANY QUESTIONS/CONCERNS REGARDING RADIATION THERAPY

## 2025-02-25 NOTE — TELEPHONE ENCOUNTER
Protocol passed     LOV: 2/22/24   RTC: 6 months  Filled: 11/15/24 #90   Labs: 2/18/25   Future Appointments   Date Time Provider Department Center   3/13/2025  3:00 PM Mike Buchanan MD [facility]

## 2025-02-25 NOTE — PROGRESS NOTES
[facility]  RADIATION ONCOLOGY   FOLLOW UP     Mark Olivarez  6/25/1945    DIAGNOSIS: prostate cancer     CANCER HISTORY   78 yo with T1c, [place] score 4+3, iPSA 13  About 90 gram gland  6 mm L Pir[place] 4 lesion on MRI  9 total + cores  PET with multfocal uptake in prostate only  6 mo ADT 8/2024    Prostate   7000 cGy in 28 fractions   10/14/2024 to 11/20/2024      INTERIM HISTORY   Here w/ wife. AUA 8. BMs are regular. No blood, no pain.  Fatigued. Happy that his PSA 2/18/2025 is 0.05.    EXAM   169#  Well appearing    IMPRESSION/PLAN   3 months out from short term ADT and EBRT  Anticipate ADT wearing off  Check PSA q 6 mo  See urology in 9/2025 and f/u here 3/2026    Abdi Bennett MD  Radiation Oncology    15 minutes were spent with the patient, more than 50 percent on counseling/coordination of care (discuss disease status, management of any side effects, future follow up plans)

## 2025-03-13 ENCOUNTER — OFFICE VISIT (OUTPATIENT)
Dept: INTERNAL MEDICINE CLINIC | Facility: CLINIC | Age: 80
End: 2025-03-13
Payer: MEDICARE

## 2025-03-13 VITALS
DIASTOLIC BLOOD PRESSURE: 60 MMHG | HEART RATE: 64 BPM | BODY MASS INDEX: 27.48 KG/M2 | WEIGHT: 171 LBS | HEIGHT: 66 IN | TEMPERATURE: 98 F | SYSTOLIC BLOOD PRESSURE: 122 MMHG | RESPIRATION RATE: 16 BRPM | OXYGEN SATURATION: 96 %

## 2025-03-13 DIAGNOSIS — J44.9 OBSTRUCTIVE LUNG DISEASE (HCC): ICD-10-CM

## 2025-03-13 DIAGNOSIS — E78.00 PURE HYPERCHOLESTEROLEMIA: Chronic | ICD-10-CM

## 2025-03-13 DIAGNOSIS — I10 ESSENTIAL HYPERTENSION: Chronic | ICD-10-CM

## 2025-03-13 DIAGNOSIS — C61 PROSTATE CANCER (HCC): ICD-10-CM

## 2025-03-13 DIAGNOSIS — Z00.00 ROUTINE GENERAL MEDICAL EXAMINATION AT A HEALTH CARE FACILITY: Primary | ICD-10-CM

## 2025-03-13 RX ORDER — ALBUTEROL SULFATE 90 UG/1
2 INHALANT RESPIRATORY (INHALATION) EVERY 6 HOURS PRN
Qty: 1 EACH | Refills: 2 | Status: SHIPPED | OUTPATIENT
Start: 2025-03-13 | End: 2025-04-12

## 2025-03-13 RX ORDER — PREDNISONE 5 MG/1
15 TABLET ORAL 2 TIMES DAILY
Qty: 90 TABLET | Refills: 0 | Status: SHIPPED | OUTPATIENT
Start: 2025-03-13 | End: 2025-03-28

## 2025-03-13 RX ORDER — ATORVASTATIN CALCIUM 10 MG/1
10 TABLET, FILM COATED ORAL NIGHTLY
Qty: 90 TABLET | Refills: 1 | Status: SHIPPED | OUTPATIENT
Start: 2025-03-13 | End: 2025-09-09

## 2025-03-13 RX ORDER — PANTOPRAZOLE SODIUM 40 MG/1
40 TABLET, DELAYED RELEASE ORAL
Qty: 90 TABLET | Refills: 0 | Status: SHIPPED | OUTPATIENT
Start: 2025-03-13

## 2025-03-13 NOTE — PROGRESS NOTES
REASON FOR VISIT:    Mark Olivarez is a 79 year old male who presents for a MA Supervisit.    male     Patient Care Team: Patient Care Team:  Mike Buchanan MD as PCP - General (Internal Medicine)  Abdi Bennett MD (Radiation Oncology)  James Beach MD (Radiation Oncology)  Wally Solorzano MD (UROLOGY)    Patient Active Problem List   Diagnosis    Essential hypertension    Pure hypercholesterolemia    Prostate cancer (HCC)     Current Outpatient Medications   Medication Sig Dispense Refill    pantoprazole 40 MG Oral Tab EC Take 1 tablet (40 mg total) by mouth before breakfast. 90 tablet 0    atorvastatin 10 MG Oral Tab Take 1 tablet (10 mg total) by mouth nightly. 90 tablet 1    albuterol 108 (90 Base) MCG/ACT Inhalation Aero Soln Inhale 2 puffs into the lungs every 6 (six) hours as needed for Wheezing. 1 each 2    predniSONE 5 MG Oral Tab Take 3 tablets (15 mg total) by mouth 2 (two) times daily for 15 days. 90 tablet 0    LOSARTAN POTASSIUM-HCTZ 100-12.5 MG Oral Tab Take 1 tablet by mouth once daily 90 tablet 0    AMLODIPINE 5 MG Oral Tab Take 1 tablet by mouth once daily 90 tablet 0    tamsulosin 0.4 MG Oral Cap Take 1 capsule (0.4 mg total) by mouth every evening. 90 capsule 6    aspirin 81 MG Oral Tab Take 1 tablet (81 mg total) by mouth daily.             Latest Ref Rng & Units 2/18/2025     8:50 AM 2/22/2024    10:55 AM 3/9/2023     7:24 AM 3/7/2022     8:03 AM 10/31/2020    11:09 AM 9/2/2020     9:42 AM 2/7/2020     9:44 AM   Glucose and HbA1c   Glucose 70 - 99 mg/dL 100  100  107  110  95  100  103          Latest Ref Rng & Units 2/18/2025     8:50 AM 2/22/2024    10:55 AM 3/9/2023     7:24 AM 3/7/2022     8:03 AM 9/2/2020     9:42 AM 2/7/2020     9:44 AM 1/31/2019     9:02 AM   Cholesterol   Total Cholesterol <200 mg/dL 232  180  186  191  198  202  159    Triglycerides 30 - 149 mg/dL 76  59  73  79  56  70  65    HDL 40 - 59 mg/dL 75  60  56  53  56  57  51    LDL <100 mg/dL 144  109  116  120   131  131  95          Latest Ref Rng & Units 2/18/2025     8:50 AM 2/22/2024    10:55 AM 3/9/2023     7:24 AM 3/7/2022     8:03 AM 10/31/2020    11:09 AM 9/2/2020     9:42 AM 2/7/2020     9:44 AM   BUN and Cr   BUN 9 - 23 mg/dL 20  13  19  16  16  18  15    Creatinine 0.70 - 1.30 mg/dL 0.96  1.16  1.06  0.98  1.09  1.09  1.16          Latest Ref Rng & Units 2/18/2025     8:50 AM 2/22/2024    10:55 AM 3/9/2023     7:24 AM 3/7/2022     8:03 AM 10/31/2020    11:09 AM 9/2/2020     9:42 AM 2/7/2020     9:44 AM   AST and ALT   AST (SGOT) <34 U/L 18  19  20  16  17  19  18    ALT (SGPT) 10 - 49 U/L 25  25  35  26  37  37  41          Latest Ref Rng & Units 2/18/2025     8:50 AM 2/22/2024    10:55 AM 2/7/2020     9:44 AM 1/31/2019     9:02 AM 10/6/2017     7:39 AM   TSH and Free T4   TSH 0.550 - 4.780 uIU/mL 1.643  1.240  0.960  1.150  1.140          Latest Ref Rng & Units 3/9/2023     7:24 AM 7/1/2021     3:14 PM 9/2/2020     9:42 AM 2/7/2020     9:44 AM 1/31/2019     9:02 AM   [facility] REVIEW FLOWSHEET PSA   PSA <=4.00 ng/mL 10.40  9.96  6.84  8.11  8.51        General Health      In the past six months, have you lost more than 10 pounds without trying?: 2 - No  Has your appetite been poor?: No  Type of Diet: Balanced  How does the patient maintain a good energy level?: Appropriate Exercise  How would you describe your daily physical activity?: Heavy  How would you describe your current health state?: Good  How do you maintain positive mental well-being?: Social Interaction  Have you had any immunizations at another office such as Influenza, Hepatitis B, Tetanus, or Pneumococcal?: No     Functional Ability     Bathing or Showering: Able without help  Toileting: Able without help  Dressing: Able without help  Eating: Able without help  Driving: Able without help  Preparing your meals: Able without help  Managing money/bills: Able without help  Taking medications as prescribed: Able without help  Are you able to  afford your medications?: Yes  Hearing Problems?: No     Functional Status     Hearing Problems?: No  Vision Problems? : No  Difficulty walking?: No  Difficulty dressing or bathing?: No  Problems with daily activities? : No  Memory Problems?: No      Fall/Risk Assessment                 Depression Screening (PHQ-2/PHQ-9): Over the LAST 2 WEEKS            Advance Directives     Do you have a healthcare power of ?: Yes  Do you have a living will?: Yes     Cognitive Assessment     What day of the week is this?: Correct  What month is it?: Correct  What year is it?: Correct  Recall \"Ball\": Correct  Recall \"Flag\": Correct  Recall \"Tree\": Correct         PREVENTATIVE SERVICES   INDICATIONS AND SCHEDULE Internal Lab or Procedure   Diabetes Screening     HbgA1C   Annually HEMOGLOBIN A1c (% of total Hgb)   Date Value   02/20/2017 5.6     HgbA1C (%)   Date Value   02/18/2025 5.9 (H)       Fasting Blood Sugar (FSB)Annually Glucose (mg/dL)   Date Value   02/18/2025 100 (H)     GLUCOSE (mg/dL)   Date Value   03/07/2022 110 (H)      Cardiovascular Disease Screening    LDL Annually LDL Cholesterol (mg/dL)   Date Value   02/18/2025 144 (H)     LDL-CHOLESTEROL (mg/dL (calc))   Date Value   03/07/2022 120 (H)       EKG - w/ Initial Preventative Physical Exam only, or if medically necessary yes   Colorectal Cancer Screening     Colonoscopy Screen every 10 years Health Maintenance   Topic Date Due    Colorectal Cancer Screening  Discontinued       Flex Sigmoidoscopy Screen every 5 years No results found for this or any previous visit.    Fecal Occult Blood Annually Occult Blood Result (no units)   Date Value   04/24/2018 Positive for Occult Blood (A)      Glaucoma Screening     Ophthalmology Visit Annually adv   Immunizations     Zoster (Not covered by Medicare Part B) No orders found for this or any previous visit.     SPECIFIC DISEASE MONITORING Internal Lab or Procedure   No disease specific diagnoses       ALLERGIES:    Allergies[1]  MEDICAL INFORMATION:   Past Medical History:    BPH (benign prostatic hyperplasia)    DDD (degenerative disc disease), cervical    DJD (degenerative joint disease), multiple sites    hips, lumbar, cervical    Essential hypertension    High blood pressure    High cholesterol    Hypertension    Incarcerated umbilical hernia    Other and unspecified hyperlipidemia    Primary osteoarthritis of both knees    PVD (peripheral vascular disease)    Status post total left knee replacement    Umbilical hernia    Varicose vein of leg      Past Surgical History:   Procedure Laterality Date    Eye surgery      left     Leg/ankle surgery proc unlisted      left leg    Repair ing hernia,5+y/o,reducibl      Total knee replacement Left 02/26/2019    Dr Lopez      Family History   Problem Relation Age of Onset    Other (Other) Father         alz    Other (Other) Mother         alz    Cancer Brother 60        prostate     Immunization History      Immunization History  Administered            Date(s) Administered    Covid-19 Vaccine [employer] ([employer]) 0.5ml                          05/31/2021      FLUZONE 6 months and older PFS 0.5 ml (16697)                          10/02/2017  09/01/2020      Influenza Virus Vaccine, Split                          10/11/2008      Pneumococcal (Prevnar 13)                          10/02/2017      Pneumovax 23          03/05/2022        SOCIAL HISTORY:   Social History     Socioeconomic History    Marital status:     Number of children: 1   Occupational History    Occupation: Maintanence at [employer]     Comment: works 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   Social History Narrative    - lives with wife    1 son    1 child that passed        REVIEW OF SYSTEMS:     General/Constitutional:   General able to do usual activities, good exercise tolerance, good general state of health, no fatigue, no fever, no weakness .   HEENT/Neck:   Head no dizziness, no lightheadedness. Eyes normal vision, no redness , no drainage. Ears no earaches, no fullness,   +decreased hearing, no tinnitus. Nose and Sinuses no recurrent colds, no stuffiness, no discharge, no hay fever, no nosebleeds, no sinus trouble. Mouth and Pharynx no sore throats, no hoarseness. Neck no lumps, no goiter, no neck stiffness or pain.   Endocrine:   Diabetes none. Thyroid disorder none.   Respiratory:   Patient denies chest pain, cough, PERDOMO (dyspnea on exertion),. Breathing normal pattern . Chest congestion none.   Cough continues to wheeze does have some cough with minimal expectoration  Cardiovascular:   Patient denies chest pain, rheumatic fever,heart murmur. hx of elevated cholesterol/hypertension. Leg edema none. Orthopnea no. Palpitations none. PND (paroxsymal nocturnal dyspnea) none. Exercise -physical job   Gastrointestinal:   Patient denies  acid reflux, blood in stool, vomiting, nausea, heartburn no change in appetite noted no change in bowel movement noted. Dysphagia none.    Hematology:   Patient denies abnormal bleeding, easy bruising. Enlarged lymph nodes none.   Men Only:   Patient denies penile discharge, testicular pain   Genitourinary:   Patient denies blood in urine, burning on urination,   , urinary frequency , urinary incontinence/no history of kidney disease or genital abnormalities. Dysuria none.   scheduled to see urology Dr Solorzano for f/u   Musculoskeletal:   Patient denies arthritis , back pain, muscle weakness . Joint pain complains of chronic right shoulder pain.  Joint stiffness none.  Complains of pain in both feet along with deformed  feet  Peripheral Vascular:   General no varicosities, no claudication.   Dermatologic:   Rash none.  --nail fungus   Neurologic:   Patient denies dizziness, fainting, headache, loss of consciousness, memory loss, seizures, paresthesias, weakness. Tingling/numbness none. Trouble with balance none.   Psychiatric:   Patient denies anxiety, depression, hallucinations. Insomnia none/no hx of sleep disorder. Memory loss none.   EXAM:   /60 (BP Location: Left arm, Patient Position: Sitting, Cuff Size: adult)   Pulse 64   Temp 98.1 °F (36.7 °C) (Temporal)   Resp 16   Ht 5' 6\" (1.676 m)   Wt 171 lb (77.6 kg)   SpO2 96%   BMI 27.60 kg/m²    >   BP Readings from Last 3 Encounters:   03/13/25 122/60   02/25/25 (!) 174/67   01/09/25 130/64     GENERAL:   Build: average .   HEENT:   Ear canals: normal.     EOM: within normal limits.Pupils BEERTL.Sclera and Conjunctiva normal.  Head: normocephalic.   Nasal septum: midline.   Nose: normal pink mucosa, no congestion, no swelling, no bleeding.   Oral cavity: normal, no lesions seen.   Turbinates: normal.   NECK:   Carotid bruit: none.   Cervical lymph nodes: unremarkable.   JVD: none.   Range of Motion: normal.   Thyroid: unremarkable.   HEART:   Clicks: no.   Edema: none visible .   Heart sounds: normal.   Murmurs: none.   Rhythm: regular.   LUNGS:      Chest Shape: normal .   Percussion: normal.   Rales: no .   Respiratory effort: normal .      Wheezes: There is expiratory bases bilateral wheeze noted  ABDOMEN:   Bowel sounds: normal.   General: normal.   Hernia: Scar of previous hernia surgery noted.  Diastases of recti noted  Liver, Spleen: no hepatosplenomegaly (HSM).   Tenderness: absent .   GENITOURINARY - MALE:   External genitals: unremarkable.   JESUS: normal .   Penis: unremarkable.   Prostate: per uro  Testicles: unremarkable.   EXTREMITIES:   Clubbing: none.   Cyanosis: absent .   Edema: none.   Pulses: present.   Tremors: no.   Varicose veins: RLE evident  extensive  MUSCULOSKELETAL:   Cervical spines: normal.   L-S spines: normal.   Lower extremity joints: -hammer toes and bunions noted-s/p surgery on left knee   OA  also noted in the right knee as well as both hips on clinical examination   Upper extremity joints: Right shoulder movements are definitely restricted with significant crepitus noted during movement suggestive of degenerative joint disease  NEUROLOGICAL:   Cognitive function  Mood /affect -thought :perception :normal  Appearance-orientation normal  Thought normal   Babinski: negative..   Cerebellar Testing grossly/intact: yes..   Cranial Nerves: CN's II-XII grossly intact.   Gait: normal.   Motor: Power,tone,co-ordination normalInvoluntary movements and wasting none.   Reflexes: normal.   Sensory: all sensory modalities normal.   LYMPHATICS:   Cervical: none.   Groin: no adenopathy .   Inguinal: no adenopathy.   Supraclavicular: none.   DERMATOLOGY:   Rash: no. Dystrophic nails l fingers and toes --postinflammatory pigmentation noted on the anterior abdomen    ASSESSMENT AND OTHER RELEVANT CHRONIC CONDITIONS:   Mark Olivarez is a 79 year old male who presents for a Medicare Assessment.     PLAN SUMMARY:   Diagnoses and all orders for this visit:    Routine general medical examination at a health care facility    Essential hypertension    Pure hypercholesterolemia  -     atorvastatin 10 MG Oral Tab; Take 1 tablet (10 mg total) by mouth nightly.  -     Lipid Panel  -     Hepatic Function Panel (7)    Prostate cancer (HCC)    Obstructive lung disease (HCC)  -     albuterol 108 (90 Base) MCG/ACT Inhalation Aero Soln; Inhale 2 puffs into the lungs every 6 (six) hours as needed for Wheezing.  -     predniSONE 5 MG Oral Tab; Take 3 tablets (15 mg total) by mouth 2 (two) times daily for 15 days.  -     Pulmonary Referral - In Network    Other orders  -     pantoprazole 40 MG Oral Tab EC; Take 1 tablet (40 mg total) by mouth before breakfast.       The patient  indicates understanding of these issues and agrees to the plan.  The patient is asked to return in 1 week  for f/u   Diet counseling perfomed    SUGGESTED VACCINATIONS - Influenza, Pneumococcal, Zoster, Tetanus   Influenza: No recommendations at this time  Pneumonia: No recommendations at this time  Shingrix shingles vaccine is due            [1]   Allergies  Allergen Reactions    Ciprofloxacin HYPOTENSION, OTHER (SEE COMMENTS) and NAUSEA ONLY    Pcn [Penicillins] HIVES

## 2025-04-08 ENCOUNTER — TELEPHONE (OUTPATIENT)
Dept: INTERNAL MEDICINE CLINIC | Facility: CLINIC | Age: 80
End: 2025-04-08

## 2025-04-08 DIAGNOSIS — J44.9 OBSTRUCTIVE LUNG DISEASE (HCC): ICD-10-CM

## 2025-04-08 NOTE — TELEPHONE ENCOUNTER
Spoke with patient's spouse to triage, OK per verbal release, patient is currently sleeping. Patient's spouse states that he has started coughing and it is semi-productive. Patient's temperature was taken about 30 mins prior to this call and it was 101.1. Pt is experiencing body aches. Pt hasn't complained of shortness of breath. He hasn't taken an at home COVID test. He has been using OTC Nyquil and Equate ([employer] brand) cough syrup. Pt also has cataract surgery tomorrow. Pt's spouse requesting for recommendations.    This RN recommended that patient be evaluated as PCP is out of office and providers in office are currently booked today. This RN recommended that patient go to a walk-in clinic to be evaluated or to an urgent care to be evaluated. Also recommended that patient will have to reschedule cataract procedure since he's feeling unwell. Pt v/u. Pt's spouse may take him to the urgent care later today.     TJ: KRYSTINA pt. Pt is experiencing cough, 101.1 fever, body aches. Has been taking Nyquil and OTC [employer] brand cough syrup. Pt requesting for medication. This RN rec pt be evaluated by a provider, but schedules are booked to rec Walk-in clinic/. Any further recommendations?

## 2025-04-08 NOTE — TELEPHONE ENCOUNTER
Please triage pt as he is feeling horrible and is scheduled to have cataract surgery tomorrow.    Per spouse has a terrible cough and body aches.    Cheryl (spouse) best call back [phone]

## 2025-04-08 NOTE — TELEPHONE ENCOUNTER
I agree with patient visiting to urgent care.  It is a good idea to postpone cataract surgery that is planned tomorrow.

## 2025-04-08 NOTE — TELEPHONE ENCOUNTER
Pt requesting medication refill. [phone] Store #: [id number]     To help get rid of terrible cough he has going on currently as he is scheduled for cataract surgery tmrw.

## 2025-04-09 RX ORDER — PREDNISONE 5 MG/1
15 TABLET ORAL 2 TIMES DAILY
Qty: 90 TABLET | Refills: 0 | Status: SHIPPED | OUTPATIENT
Start: 2025-04-09 | End: 2025-04-24

## 2025-04-14 ENCOUNTER — TELEPHONE (OUTPATIENT)
Dept: RADIATION ONCOLOGY | Facility: HOSPITAL | Age: 80
End: 2025-04-14

## 2025-05-02 ENCOUNTER — TELEPHONE (OUTPATIENT)
Dept: INTERNAL MEDICINE CLINIC | Facility: CLINIC | Age: 80
End: 2025-05-02

## 2025-05-02 DIAGNOSIS — E78.00 PURE HYPERCHOLESTEROLEMIA: Primary | ICD-10-CM

## 2025-05-02 NOTE — TELEPHONE ENCOUNTER
Labs updated to be completed at [facility].   
Patient requesting labs to be switched from [facility] to [facility]. Labs from March.   
3

## 2025-05-13 NOTE — PROGRESS NOTES
Nursing Follow-Up Note    Patient: Mark Olivarez  YOB: 1945  Age: 79 year old  Radiation Oncologist: Dr. Abdi Bennett  Referring Physician: Abdi Bennett  Chief Complaint:   Chief Complaint   Patient presents with    Follow - Up     Date: 5/13/2025    Toxicities: n/a    Vital Signs: /78 (BP Location: Left arm, Patient Position: Sitting, Cuff Size: adult)   Pulse 75   Temp 98.1 °F (36.7 °C) (Tympanic)   Resp 16   Wt 79.5 kg (175 lb 3.2 oz)   SpO2 100%   BMI 28.28 kg/m² ,   Wt Readings from Last 6 Encounters:   05/14/25 79.5 kg (175 lb 3.2 oz)   05/02/25 77.6 kg (171 lb)   04/11/25 77.6 kg (171 lb)   03/13/25 77.6 kg (171 lb)   03/31/25 77.6 kg (171 lb)   02/25/25 76.7 kg (169 lb 3.2 oz)       Allergies:  Allergies[1]    Nursing Note: Hx of prostate cancer. Completed 6 month ADT Aug 2024. Completed RT to prostate 11/20/2024. PSA done 2/18/25- 0.05. Here for follow up today. Pt feels well today. Had cataract surgery done bilaterally in the last month. AUA 5. DAYTON 1. Pt reports he gets tired easily. Pt has been dealing with cough- was put on prednisone and albuterol. Pt continues on flomax 0.4mg in the evening. Pt denies urinary urgency, dysuria or hematuria. Has some urinary frequency pt relates to drinking fluids. Nocturia x 3-4. Denies diarrhea, constipation, or blood in stool.     PSA:    Lab Results   Component Value Date    PSA 0.05 02/18/2025    PSA 10.40 (H) 03/09/2023    PSA 9.96 (H) 07/01/2021    PSA 6.84 (H) 09/02/2020    PSA 8.11 (H) 02/07/2020    PSA 8.51 (H) 01/31/2019       PSA Screen:    Lab Results   Component Value Date    PSAS 13.50 (H) 02/22/2024    PSAS 5.05 (H) 10/06/2017                [1]   Allergies  Allergen Reactions    Ciprofloxacin HYPOTENSION, OTHER (SEE COMMENTS) and NAUSEA ONLY    Pcn [Penicillins] HIVES

## 2025-05-14 ENCOUNTER — HOSPITAL ENCOUNTER (OUTPATIENT)
Dept: RADIATION ONCOLOGY | Facility: HOSPITAL | Age: 80
Discharge: HOME | End: 2025-05-14
Attending: RADIOLOGY
Payer: MEDICARE

## 2025-05-14 VITALS
HEART RATE: 75 BPM | RESPIRATION RATE: 16 BRPM | WEIGHT: 175.19 LBS | OXYGEN SATURATION: 100 % | DIASTOLIC BLOOD PRESSURE: 78 MMHG | BODY MASS INDEX: 28 KG/M2 | SYSTOLIC BLOOD PRESSURE: 137 MMHG | TEMPERATURE: 98 F

## 2025-05-14 DIAGNOSIS — C61 PROSTATE CANCER (HCC): Primary | ICD-10-CM

## 2025-05-14 PROCEDURE — 99213 OFFICE O/P EST LOW 20 MIN: CPT

## 2025-05-14 RX ORDER — ALBUTEROL SULFATE 90 UG/1
INHALANT RESPIRATORY (INHALATION) EVERY 6 HOURS PRN
COMMUNITY

## 2025-05-14 NOTE — PROGRESS NOTES
[facility]  RADIATION ONCOLOGY   FOLLOW UP     Mark Olivarez  6/25/1945    DIAGNOSIS: Prostate cancer     CANCER HISTORY   T1c, Nerstrand score 4+3, iPSA 13  About 90 gram gland  6 mm L Pirads 4 lesion on MRI  9 total + cores  PET with multfocal uptake in prostate only  6 mo ADT 8/2024     Prostate   7000 cGy in 28 fractions   10/14/2024 to 11/20/2024     INTERIM HISTORY   Doing well.     Feels a bit stronger since his ADT wore off 2/2025. Urination about the same, he's content, takes 1 Flomax, nocturia is 4x, during the day every 1-2 hours, drinks a lot of water, some coffee in the AM, no soda, some decaf tea evenings. He's not sure if urination is worse since RT.    BMs have been good, same, no change since RT.     Has had recurrent cough, treated with short courses of prednisone and albuterol, that I assured him was not due to his prostate RT.     Had cataract surgery, went ok.  Needs work done on this knee that bothers him.    PSA 2/2025:  0.05    EXAM     Vitals:    05/14/25 1028   BP: 137/78   Pulse: 75   Resp: 16   Temp: 98.1 °F (36.7 °C)     175#  NAD    IMPRESSION/PLAN   PSA low after short ADT and EBRT    Psa 8/2025 and 2/2026   F/u 2/2026    Try 1 week of Flomax 0.8 mg    Abdi Bennett MD  Radiation Oncology    15 minutes were spent with the patient, more than 50 percent on counseling/coordination of care (discuss disease status, management of any side effects, future follow up plans)

## 2025-05-14 NOTE — PATIENT INSTRUCTIONS
- WE WILL CALL TO SCHEDULE YOUR FOLLOW-UP APPOINTMENT WITH DR. WELCH IN FEBRUARY 2026 WITH PSA    - HAVE PSA DRAWN IN AUGUST 2025 AND IN FEBRUARY 2026 BEFORE YOUR APPOINTMENT WITH DR WELCH      - CALL DR. WELCH'S NURSES AT [phone] IF YOU HAVE ANY QUESTIONS/CONCERNS REGARDING RADIATION THERAPY

## 2025-06-02 DIAGNOSIS — I10 ESSENTIAL HYPERTENSION: ICD-10-CM

## 2025-06-02 NOTE — TELEPHONE ENCOUNTER
AMLODIPINE 5 MG Oral Tab     LOSARTAN POTASSIUM-HCTZ 100-12.5 MG Oral Tab     [facility] [address] [place] [address] [phone], [phone]

## 2025-06-03 RX ORDER — LOSARTAN POTASSIUM AND HYDROCHLOROTHIAZIDE 12.5; 1 MG/1; MG/1
1 TABLET ORAL
Qty: 90 TABLET | Refills: 0 | Status: SHIPPED | OUTPATIENT
Start: 2025-06-03

## 2025-06-03 RX ORDER — AMLODIPINE BESYLATE 5 MG/1
5 TABLET ORAL DAILY
Qty: 90 TABLET | Refills: 0 | Status: SHIPPED | OUTPATIENT
Start: 2025-06-03

## 2025-06-03 NOTE — TELEPHONE ENCOUNTER
Protocol passed    Requesting LOSARTAN POTASSIUM-HCTZ 100-12.5 MG Oral Tab   LOV: 02/25/25  RTC: 1 week  Last Relevant Labs: 02/18/25  Filled: 03/13/25 #90 with 1 refills    No future appointments.   Protocol passed   Requesting AMLODIPINE 5 MG Oral Tab   LOV: 03/13/25  RTC: 1 week  Last Relevant Labs: 02/18/25  Filled: 02/17/25 #90 with 0 refills    No future appointments.

## 2025-06-10 RX ORDER — PREDNISOLONE 5 MG/1
5 TABLET ORAL DAILY
COMMUNITY

## 2025-06-11 RX ORDER — PREDNISOLONE 5 MG/1
5 TABLET ORAL 3 TIMES DAILY
Qty: 90 TABLET | Refills: 0 | OUTPATIENT
Start: 2025-06-11

## 2025-06-11 NOTE — TELEPHONE ENCOUNTER
Requesting    prednisoLONE 5 MG Oral Tab         Sig: Take 1 tablet (5 mg total) by mouth in the morning, at noon, and at bedtime.    Disp: 90 tablet    Refills:    Start: 6/10/2025    Class: Normal    Non-formulary    Last ordered: Yesterday (6/10/2025) by Reported External/Patient     LOV: 3/13/2025   Last Relevant Labs: n/a  Filled: 4/9/2025 #90 with 0 refills    Future Appointments   Date Time Provider Department Center   7/3/2025 12:30 PM Abi Garvin MD [facility] [place]

## 2025-06-13 NOTE — TELEPHONE ENCOUNTER
Future Appointments   Date Time Provider Department Center   7/3/2025 12:30 PM Abi Garvin MD [facility] [place]

## 2025-06-20 ENCOUNTER — HOSPITAL ENCOUNTER (OUTPATIENT)
Age: 80
Discharge: HOME | End: 2025-06-20
Payer: MEDICARE

## 2025-06-20 ENCOUNTER — APPOINTMENT (OUTPATIENT)
Dept: GENERAL RADIOLOGY | Age: 80
End: 2025-06-20
Attending: NURSE PRACTITIONER
Payer: MEDICARE

## 2025-06-20 VITALS
HEART RATE: 94 BPM | WEIGHT: 171 LBS | OXYGEN SATURATION: 96 % | TEMPERATURE: 99 F | SYSTOLIC BLOOD PRESSURE: 127 MMHG | RESPIRATION RATE: 20 BRPM | BODY MASS INDEX: 28 KG/M2 | DIASTOLIC BLOOD PRESSURE: 68 MMHG

## 2025-06-20 DIAGNOSIS — J06.9 UPPER RESPIRATORY TRACT INFECTION, UNSPECIFIED TYPE: Primary | ICD-10-CM

## 2025-06-20 DIAGNOSIS — R06.2 WHEEZING: ICD-10-CM

## 2025-06-20 PROCEDURE — 71046 X-RAY EXAM CHEST 2 VIEWS: CPT | Performed by: NURSE PRACTITIONER

## 2025-06-20 PROCEDURE — 94640 AIRWAY INHALATION TREATMENT: CPT

## 2025-06-20 PROCEDURE — 99214 OFFICE O/P EST MOD 30 MIN: CPT

## 2025-06-20 RX ORDER — METHYLPREDNISOLONE 4 MG/1
TABLET ORAL
Qty: 1 EACH | Refills: 0 | Status: SHIPPED | OUTPATIENT
Start: 2025-06-20

## 2025-06-20 RX ORDER — ALBUTEROL SULFATE 0.83 MG/ML
2.5 SOLUTION RESPIRATORY (INHALATION) EVERY 4 HOURS PRN
Qty: 30 EACH | Refills: 0 | Status: SHIPPED | OUTPATIENT
Start: 2025-06-20 | End: 2025-07-20

## 2025-06-20 RX ORDER — ALBUTEROL SULFATE 90 UG/1
2 INHALANT RESPIRATORY (INHALATION) EVERY 4 HOURS PRN
Qty: 1 EACH | Refills: 0 | Status: SHIPPED | OUTPATIENT
Start: 2025-06-20 | End: 2025-07-20

## 2025-06-20 RX ORDER — ALBUTEROL SULFATE 0.83 MG/ML
2.5 SOLUTION RESPIRATORY (INHALATION) ONCE
Status: COMPLETED | OUTPATIENT
Start: 2025-06-20 | End: 2025-06-20

## 2025-06-20 RX ORDER — BENZONATATE 100 MG/1
100 CAPSULE ORAL 3 TIMES DAILY PRN
Qty: 30 CAPSULE | Refills: 0 | Status: SHIPPED | OUTPATIENT
Start: 2025-06-20 | End: 2025-07-20

## 2025-06-20 RX ORDER — IPRATROPIUM BROMIDE AND ALBUTEROL SULFATE 2.5; .5 MG/3ML; MG/3ML
3 SOLUTION RESPIRATORY (INHALATION) ONCE
Status: COMPLETED | OUTPATIENT
Start: 2025-06-20 | End: 2025-06-20

## 2025-06-20 NOTE — DISCHARGE INSTRUCTIONS
- call and make an appointment to see pulmonology as soon as possible     - follow up with Dr. Buchanan this week     - use inhaler / nebulizer machine as needed     - go to the ER with any worsening symptoms or concerns

## 2025-06-20 NOTE — ED PROVIDER NOTES
Patient Seen in: [facility]        History  Chief Complaint   Patient presents with    Cough/URI     Stated Complaint: Cough    Subjective:   HPI    Pt is a 79yr old male with prostated ca, htn, here with 4-5 days  of dry cough.  Saw his pmd for the same with 2 rounds of prednisone with some relief of symptoms  he reports a continuation of his symptoms.  Denies fever, chills.  He denies shortness of breath, chest pain.  He has some relief after using his inhaler        Objective:     Past Medical History:    BPH (benign prostatic hyperplasia)    DDD (degenerative disc disease), cervical    DJD (degenerative joint disease), multiple sites    hips, lumbar, cervical    Essential hypertension    High blood pressure    High cholesterol    Hypertension    Incarcerated umbilical hernia    Other and unspecified hyperlipidemia    Primary osteoarthritis of both knees    PVD (peripheral vascular disease)    Status post total left knee replacement    Umbilical hernia    Varicose vein of leg              Past Surgical History:   Procedure Laterality Date    Eye surgery      left     Leg/ankle surgery proc unlisted      left leg    Repair ing hernia,5+y/o,reducibl      Total knee replacement Left 02/26/2019    Dr Lopez                Social History     Socioeconomic History    Marital status:     Number of children: 1   Occupational History    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   Social History Narrative    - lives with wife    1 son    1 child that passed              Review of  Systems    Positive for stated complaint: Cough  Other systems are as noted in HPI.  Constitutional and vital signs reviewed.      All other systems reviewed and negative except as noted above.                  Physical Exam    ED Triage Vitals [06/20/25 0950]   BP (!) 181/95   Pulse 94   Resp 20   Temp 98.8 °F (37.1 °C)   Temp src Oral   SpO2 96 %   O2 Device None (Room air)       Current Vitals:   Vital Signs  BP: 127/68  Pulse: 94  Resp: 20  Temp: 98.8 °F (37.1 °C)  Temp src: Oral    Oxygen Therapy  SpO2: 96 %  O2 Device: None (Room air)            Physical Exam  VS: Vital signs reviewed. O2 saturation within normal limits for this patient     General: Patient is awake and alert, oriented to person, place and time. Not in acute distress.      HEENT: Head is normocephalic atraumatic. Pupils reactive bilaterally.  EOMs intact.  No facial droop or slurred speech.  No oral pallor. Mucous membranes moist.      Neck: No cervical lymphadenopathy. No stridor. Supple. No meningsmus.      Heart: S1-S2.  Regular rate and rhythm.       Lungs:  inspiratory and expiratory wheezing,. No rhonchi, no stidor,   Lungs reevaluation first neb.: Still has expiratory wheezing noted throughout the lungs bilaterally.  Lungs reevaluation after second neb.  Good inspiratory effort. +air entry bilaterally without wheezes, rhonchi, crackles.  No accessory muscle use or tachypnea.       Extremities: No edema.  Pulses 2+ extremities.   Brisk capillary refill noted.      Skin: Normal skin turgor     CNS: Moves all 4 extremities.  Interacts appropriately.  No tremor.  No gait abnormality    Differential diagnosis: URI, pneumonia, asthma exacerbation          ED Course  Labs Reviewed - No data to display       I have personally  reviewed available prior medical records for any recent pertinent discharge summaries/testing. Patient/family updated on results and plan, a verbalized understanding and agreement with the plan.  I explained to the  patient that emergent conditions may arise and to go to the ER for new, worsening or any persistent conditions. I've explained the importance of taking all medicatons as prescribed, follow up, and return precuations,  All questions answered.    Please note that this report has been produced using speech recognition software and may contain errors related to that system including, but not limited to, errors in grammar, punctuation, and spelling, as well as words and phrases that possibly may have been recognized inappropriately.  If there are any questions or concerns, contact the dictating provider for clarification.                MDM     Patient presents with cough and wheezing nontoxic, does not meet SIRS criteria.   Patient does not have uvula deviation or unilateral tonsillar swelling to indicate tonsillar abscess. No meningsmus or trismus. No dysphagia or difficulty handing secretions. No evidence for otitis media. Patient does not have any respiratory distress.  O2 saturation within normal limits for this patient. Does not appear clinically dehydrated and is tolerating oral intake. Presentation consistent with a URI/asthma exacerbation.  I did prescribe a nebulizer machine, albuterol.  Patient was given 2 nebulizer treatments here with relief of his symptoms.  He was also given a Medrol Dosepak.  I discussed following up with pulmonology ASAP.. Encouraged patient on oral hydration and supportive care. Recommend follow up with PCP.  Return to ED precautions discussed with the patient and family.        Medical Decision Making      Disposition and Plan     Clinical Impression:  1. Upper respiratory tract infection, unspecified type    2. Wheezing         Disposition:  Discharge  6/20/2025 11:26 am    Follow-up:  Mike Buchanan MD  [address] [place] [id number] [phone]                Medications Prescribed:  Discharge Medication List as of 6/20/2025 11:27 AM        START taking these  medications    Details   methylPREDNISolone (MEDROL) 4 MG Oral Tablet Therapy Pack Dosepack: take as directed, Normal, Disp-1 each, R-0      benzonatate 100 MG Oral Cap Take 1 capsule (100 mg total) by mouth 3 (three) times daily as needed for cough., Normal, Disp-30 capsule, R-0      Nebulizer Does not apply Misc Nebulizer machine and tubing, Print, Disp-1 each, R-0      albuterol (2.5 MG/3ML) 0.083% Inhalation Nebu Soln Take 3 mL (2.5 mg total) by nebulization every 4 (four) hours as needed for Wheezing or Shortness of Breath., Normal, Disp-30 each, R-0                   Supplementary Documentation:

## 2025-06-20 NOTE — ED INITIAL ASSESSMENT (HPI)
Recurrent cough. Initial flare-up in Spring (March/April), was treated with prednisone (2 courses) and albuterol.    Current flare-up over last 4-5 days. Mild cough before for 1-2 weeks.    Underwent radiation for prostate Ca, after which cough originated.

## 2025-07-01 ENCOUNTER — LAB ENCOUNTER (OUTPATIENT)
Dept: LAB | Age: 80
End: 2025-07-01
Attending: INTERNAL MEDICINE
Payer: MEDICARE

## 2025-07-01 LAB
ALBUMIN SERPL-MCNC: 4.4 G/DL (ref 3.2–4.8)
ALP LIVER SERPL-CCNC: 95 U/L (ref 45–117)
ALT SERPL-CCNC: 35 U/L (ref 10–49)
AST SERPL-CCNC: 28 U/L (ref ?–34)
BILIRUB DIRECT SERPL-MCNC: 0.2 MG/DL (ref ?–0.3)
BILIRUB SERPL-MCNC: 0.7 MG/DL (ref 0.2–1.1)
CHOLEST SERPL-MCNC: 213 MG/DL (ref ?–200)
FASTING PATIENT LIPID ANSWER: YES
HDLC SERPL-MCNC: 60 MG/DL (ref 40–59)
LDLC SERPL CALC-MCNC: 137 MG/DL (ref ?–100)
NONHDLC SERPL-MCNC: 153 MG/DL (ref ?–130)
PROT SERPL-MCNC: 6.8 G/DL (ref 5.7–8.2)
TRIGL SERPL-MCNC: 88 MG/DL (ref 30–149)
VLDLC SERPL CALC-MCNC: 16 MG/DL (ref 0–30)

## 2025-07-01 PROCEDURE — 36415 COLL VENOUS BLD VENIPUNCTURE: CPT | Performed by: INTERNAL MEDICINE

## 2025-07-01 PROCEDURE — 80061 LIPID PANEL: CPT | Performed by: INTERNAL MEDICINE

## 2025-07-01 PROCEDURE — 80076 HEPATIC FUNCTION PANEL: CPT | Performed by: INTERNAL MEDICINE

## 2025-07-03 ENCOUNTER — OFFICE VISIT (OUTPATIENT)
Dept: INTERNAL MEDICINE CLINIC | Facility: CLINIC | Age: 80
End: 2025-07-03
Payer: MEDICARE

## 2025-07-03 VITALS
WEIGHT: 172.63 LBS | DIASTOLIC BLOOD PRESSURE: 66 MMHG | BODY MASS INDEX: 28 KG/M2 | SYSTOLIC BLOOD PRESSURE: 133 MMHG | OXYGEN SATURATION: 98 % | TEMPERATURE: 98 F | HEART RATE: 77 BPM

## 2025-07-03 DIAGNOSIS — I10 PRIMARY HYPERTENSION: Primary | Chronic | ICD-10-CM

## 2025-07-03 DIAGNOSIS — R05.8 RECURRENT COUGH: ICD-10-CM

## 2025-07-03 DIAGNOSIS — E78.00 PURE HYPERCHOLESTEROLEMIA: Chronic | ICD-10-CM

## 2025-07-03 DIAGNOSIS — K21.9 GASTROESOPHAGEAL REFLUX DISEASE, UNSPECIFIED WHETHER ESOPHAGITIS PRESENT: ICD-10-CM

## 2025-07-03 DIAGNOSIS — M25.561 CHRONIC PAIN OF RIGHT KNEE: ICD-10-CM

## 2025-07-03 DIAGNOSIS — C61 PROSTATE CANCER (HCC): ICD-10-CM

## 2025-07-03 DIAGNOSIS — G89.29 CHRONIC RIGHT SHOULDER PAIN: ICD-10-CM

## 2025-07-03 DIAGNOSIS — R60.0 BILATERAL LOWER EXTREMITY EDEMA: ICD-10-CM

## 2025-07-03 DIAGNOSIS — G89.29 CHRONIC PAIN OF RIGHT KNEE: ICD-10-CM

## 2025-07-03 DIAGNOSIS — M25.511 CHRONIC RIGHT SHOULDER PAIN: ICD-10-CM

## 2025-07-03 PROCEDURE — 3078F DIAST BP <80 MM HG: CPT | Performed by: INTERNAL MEDICINE

## 2025-07-03 PROCEDURE — 1159F MED LIST DOCD IN RCRD: CPT | Performed by: INTERNAL MEDICINE

## 2025-07-03 PROCEDURE — 99214 OFFICE O/P EST MOD 30 MIN: CPT | Performed by: INTERNAL MEDICINE

## 2025-07-03 PROCEDURE — 1160F RVW MEDS BY RX/DR IN RCRD: CPT | Performed by: INTERNAL MEDICINE

## 2025-07-03 PROCEDURE — 3075F SYST BP GE 130 - 139MM HG: CPT | Performed by: INTERNAL MEDICINE

## 2025-07-03 NOTE — PROGRESS NOTES
Mark Olivarez is a 80 year old male.   HPI:   Patient presents to discuss several issues. Previous patient of Dr. Buchanan.    Hypertension - at goal blood pressure.  Hypercholesterolemia - on statin therapy, tolerating.  Recently had lipid panel done but this was non-fasting.  Prostate cancer - following with Radiation Oncology (finished course of radiation therapy), Urology.  Last PSA detectable but very low.     GERD - uses PRN pantoprazole; this helps his symptoms.  He has been dealing with chronic right shoulder and right knee pain.  This has been going on for 2-3 years. Worsening over the past few months.  Lower extremity edema - patient notes this is a chronic issue.  Right > left leg.  Has issues with varicose veins/venous insufficiency - saw a vein specialist a few years ago.     Past medical, family, surgical and social history were reviewed as listed in the chart, and are unchanged from previous visit.    REVIEW OF SYSTEMS:   GENERAL/ const: no fevers/chills, no unintentional weight loss  EYES:chronic vision problems  HEENT: denies sinus pain or sinus tenderness  LUNGS: denies shortness of breath   CARDIOVASCULAR: denies chest pain  GI: denies nausea/emesis/ abdominal pain diarrhea constipation  : denies dysuria   MUSCULOSKELETAL: chronic right shoulder and right knee pain  NEURO: denies headaches  PSYCHIATRIC: denies issues  ENDOCRINE: no hot/cold intolerance  ALLERGY: Allergies[1]  PAST HISTORY:     Medications - Current[2]  Medical:  has a past medical history of BPH (benign prostatic hyperplasia), DDD (degenerative disc disease), cervical, DJD (degenerative joint disease), multiple sites, Essential hypertension, High blood pressure, High cholesterol, Hypertension, Incarcerated umbilical hernia (10/22/2020), Other and unspecified hyperlipidemia, Primary osteoarthritis of both knees (10/20/2015), PVD (peripheral vascular disease), Status post total left knee replacement (3/4/2019), Umbilical hernia,  and Varicose vein of leg (7/25/2016).  Surgical:  has a past surgical history that includes Eye surgery; leg/ankle surgery proc unlisted; total knee replacement (Left, 02/26/2019); and repair ing hernia,5+y/o,reducibl.  Family: family history includes Cancer (age of onset: 60) in his brother; Other in his father and mother.  Social:  reports that he has quit smoking. His smoking use included cigarettes and cigars. He has never used smokeless tobacco. He reports that he does not drink alcohol and does not use drugs.  Wt Readings from Last 6 Encounters:   07/03/25 172 lb 9.6 oz (78.3 kg)   06/20/25 171 lb (77.6 kg)   05/14/25 175 lb 3.2 oz (79.5 kg)   05/02/25 171 lb (77.6 kg)   04/11/25 171 lb (77.6 kg)   03/13/25 171 lb (77.6 kg)       EXAM:   /66 (BP Location: Left arm, Patient Position: Sitting, Cuff Size: adult)   Pulse 77   Temp 97.7 °F (36.5 °C) (Temporal)   Wt 172 lb 9.6 oz (78.3 kg)   SpO2 98%   BMI 27.86 kg/m²   GENERAL: Alert and oriented, well developed, well nourished,in no apparent distress  HEENT: atraumatic  NECK: supple, no jvd, no thyromegaly  LUNGS: clear to auscultation bilaterally, no wheezing/rubs  CARDIO: RRR without murmurs.  No clubbing, cyanosis or edema.  GI: soft non tender nondistended no hepatosplenomegaly, bowel sounds throughout  NEURO: CN II-XII intact, 5/5 strength all extremities  MS: pain with movements of right shoulder  PSYCH: pleasant, appropriate mood and affect  ASSESSMENT AND PLAN:   1. Primary hypertension  At goal blood pressure.  Acceptable GFR/electrolytes in February.  Will continue losartan-HCTZ 100-12.5 mg every day, amlodipine 5 mg every day.     2. Pure hypercholesterolemia  Lipids elevated.  Labs were non-fasting however.  Will continue atorvastatin at 10 mg qhs dosing for now; if lipids remain in this range will increase dose.    3. Prostate cancer (HCC)  Following with Radiation Oncology, Urology.  Finished course of radiation therapy. PSA detectable but  low at 0.05.  Has been on androgen deprivation therapy as well from Oncology.  Advised patient to schedule follow up with Urology this fall.  Appears he is having active surveillance of his prostate cancer for now from his specialists.  - Urology Referral - In Network    4. Recurrent cough  Persistent issue.  Getting episodes of recurrent cough.  Has had a few rounds of prednisone from Dr. Buchanan, also seen at the immediate care two weeks ago and prescribed a Medrol dosepak.  Cough resolved for now, lungs clear today, will hold off on another round of steroid for now. He has appointment with Pulmonology scheduled for next month, advised patient to keep this appointment.    5. Gastroesophageal reflux disease, unspecified whether esophagitis present  Stable, on PRN pantoprazole; will continue.    6. Chronic right shoulder pain  7. Chronic pain of right knee  Chronic issues, going on for 2-3 years; worsening pain in these joints over the past few months.  Significant arthritis on right knee x-ray from 2023; mild arthritis on right shoulder x-ray from 2024.  Will have patient follow up with Orthopedics clinic.  - Ortho Referral - In Network    8. Bilateral lower extremity edema  Chronic issue.  Minimal edema noted today on examination, but patient notes right leg especially tends to swell up (lower leg/ankle area).  He has seen a vein specialist in the past.  Recommend he wear compression socks/stockings for now to see if that helps.  May refer to vein specialist if symptoms persist.    Patient Care Team:  Mike Buchanan MD as PCP - General (Internal Medicine)  Abdi Bennett MD (Radiation Oncology)  James Beach MD (Radiation Oncology)  Wally Solorzano MD (UROLOGY)  The patient indicates understanding of these issues and agrees to the plan.  The patient is asked to return to clinic in March 2026 for follow up on chronic issues/MA Supervisit, or earlier if acute issues arise.    Abi Garvin MD           [1]    Allergies  Allergen Reactions    Ciprofloxacin HYPOTENSION, OTHER (SEE COMMENTS) and NAUSEA ONLY    Pcn [Penicillins] HIVES   [2]   Current Outpatient Medications:     albuterol 108 (90 Base) MCG/ACT Inhalation Aero Soln, Inhale 2 puffs into the lungs every 4 (four) hours as needed for Wheezing., Disp: 1 each, Rfl: 0    Nebulizer Does not apply Misc, Nebulizer machine and tubing, Disp: 1 each, Rfl: 0    albuterol (2.5 MG/3ML) 0.083% Inhalation Nebu Soln, Take 3 mL (2.5 mg total) by nebulization every 4 (four) hours as needed for Wheezing or Shortness of Breath., Disp: 30 each, Rfl: 0    amLODIPine 5 MG Oral Tab, Take 1 tablet (5 mg total) by mouth daily., Disp: 90 tablet, Rfl: 0    Losartan Potassium-HCTZ 100-12.5 MG Oral Tab, Take 1 tablet by mouth in the morning., Disp: 90 tablet, Rfl: 0    pantoprazole 40 MG Oral Tab EC, Take 1 tablet (40 mg total) by mouth before breakfast., Disp: 90 tablet, Rfl: 0    atorvastatin 10 MG Oral Tab, Take 1 tablet (10 mg total) by mouth nightly., Disp: 90 tablet, Rfl: 1    tamsulosin 0.4 MG Oral Cap, Take 1 capsule (0.4 mg total) by mouth every evening., Disp: 90 capsule, Rfl: 6    aspirin 81 MG Oral Tab, Take 1 tablet (81 mg total) by mouth in the morning., Disp: , Rfl:

## 2025-07-03 NOTE — PATIENT INSTRUCTIONS
- Will continue current medications   - Follow up with Dr. Higgins (pulmonologist) next month as scheduled  - Schedule appointment with our Orthopedics clinics for your knee and shoulder pains:  [facility] [place], [place]  [address] [place] [id number]  [phone]    [address] [place] [address]  [phone]    [address] [place] [id number] [phone]    - Try and wear compression socks/compression stockings for your leg swelling  - Follow up in March for Annual Medicare Wellness visit; follow up earlier as needed.    It was a pleasure seeing you in the clinic today.  [facility] you [facility] office for your healthcare needs. Please call at [phone] with any questions or concerns.    Abi Garvin MD

## 2025-07-28 ENCOUNTER — NURSE TRIAGE (OUTPATIENT)
Dept: INTERNAL MEDICINE CLINIC | Facility: CLINIC | Age: 80
End: 2025-07-28

## 2025-07-28 ENCOUNTER — TELEPHONE (OUTPATIENT)
Dept: INTERNAL MEDICINE CLINIC | Facility: CLINIC | Age: 80
End: 2025-07-28

## 2025-07-28 DIAGNOSIS — R05.8 RECURRENT COUGH: Primary | ICD-10-CM

## 2025-07-28 RX ORDER — ALBUTEROL SULFATE 90 UG/1
2 INHALANT RESPIRATORY (INHALATION) EVERY 4 HOURS PRN
Qty: 1 EACH | Refills: 0 | Status: SHIPPED | OUTPATIENT
Start: 2025-07-28 | End: 2025-08-27

## 2025-07-28 RX ORDER — BENZONATATE 100 MG/1
100 CAPSULE ORAL 3 TIMES DAILY PRN
Qty: 30 CAPSULE | Refills: 0 | Status: SHIPPED | OUTPATIENT
Start: 2025-07-28 | End: 2025-08-27

## 2025-07-28 RX ORDER — TAMSULOSIN HYDROCHLORIDE 0.4 MG/1
0.4 CAPSULE ORAL EVERY EVENING
Qty: 90 CAPSULE | Refills: 0 | Status: SHIPPED | OUTPATIENT
Start: 2025-07-28

## 2025-07-28 NOTE — TELEPHONE ENCOUNTER
PT wife calling. States  still has a bad cough and the only thing that helps it is when he takes methylPREDNISolone (MEDROL) 4 MG Oral Tablet Therapy Pack . Asking if RP would be able to send to [facility] in [place] to assist with cough.

## 2025-07-31 ENCOUNTER — TELEPHONE (OUTPATIENT)
Dept: INTERNAL MEDICINE CLINIC | Facility: CLINIC | Age: 80
End: 2025-07-31

## 2025-07-31 RX ORDER — METHYLPREDNISOLONE 4 MG/1
TABLET ORAL
Qty: 1 EACH | Refills: 0 | Status: SHIPPED | OUTPATIENT
Start: 2025-07-31 | End: 2025-07-31

## 2025-07-31 RX ORDER — METHYLPREDNISOLONE 4 MG/1
TABLET ORAL
Qty: 1 EACH | Refills: 0 | Status: SHIPPED | OUTPATIENT
Start: 2025-07-31

## 2025-08-01 ENCOUNTER — MED MANAGEMENT (OUTPATIENT)
Age: 80
End: 2025-08-01

## 2025-08-02 ENCOUNTER — HOSPITAL ENCOUNTER (OUTPATIENT)
Dept: GENERAL RADIOLOGY | Age: 80
Discharge: HOME | End: 2025-08-02
Attending: INTERNAL MEDICINE

## 2025-08-02 DIAGNOSIS — R05.8 RECURRENT COUGH: ICD-10-CM

## 2025-08-02 PROCEDURE — 71046 X-RAY EXAM CHEST 2 VIEWS: CPT | Performed by: INTERNAL MEDICINE

## 2025-08-12 ENCOUNTER — OFFICE VISIT (OUTPATIENT)
Facility: CLINIC | Age: 80
End: 2025-08-12

## 2025-08-12 VITALS — BODY MASS INDEX: 28 KG/M2 | RESPIRATION RATE: 18 BRPM | WEIGHT: 172 LBS | HEART RATE: 83 BPM | OXYGEN SATURATION: 97 %

## 2025-08-12 DIAGNOSIS — R06.02 SHORTNESS OF BREATH: ICD-10-CM

## 2025-08-12 DIAGNOSIS — R05.3 CHRONIC COUGH: Primary | ICD-10-CM

## 2025-08-12 PROCEDURE — 1160F RVW MEDS BY RX/DR IN RCRD: CPT | Performed by: INTERNAL MEDICINE

## 2025-08-12 PROCEDURE — 99204 OFFICE O/P NEW MOD 45 MIN: CPT | Performed by: INTERNAL MEDICINE

## 2025-08-12 PROCEDURE — 1159F MED LIST DOCD IN RCRD: CPT | Performed by: INTERNAL MEDICINE

## 2025-08-12 RX ORDER — BUDESONIDE AND FORMOTEROL FUMARATE DIHYDRATE 160; 4.5 UG/1; UG/1
2 AEROSOL RESPIRATORY (INHALATION) 2 TIMES DAILY
Qty: 10.2 G | Refills: 5 | Status: SHIPPED | OUTPATIENT
Start: 2025-08-12

## 2025-08-13 RX ORDER — PANTOPRAZOLE SODIUM 40 MG/1
40 TABLET, DELAYED RELEASE ORAL
Qty: 90 TABLET | Refills: 1 | Status: SHIPPED | OUTPATIENT
Start: 2025-08-13

## 2025-08-21 ENCOUNTER — HOSPITAL ENCOUNTER (OUTPATIENT)
Dept: CT IMAGING | Facility: HOSPITAL | Age: 80
Discharge: HOME | End: 2025-08-21
Attending: INTERNAL MEDICINE

## 2025-08-21 ENCOUNTER — APPOINTMENT (OUTPATIENT)
Dept: RESPIRATORY THERAPY | Facility: HOSPITAL | Age: 80
End: 2025-08-21
Attending: INTERNAL MEDICINE

## 2025-08-21 DIAGNOSIS — R06.02 SHORTNESS OF BREATH: ICD-10-CM

## 2025-08-21 DIAGNOSIS — R05.3 CHRONIC COUGH: ICD-10-CM

## 2025-08-21 PROCEDURE — 71250 CT THORAX DX C-: CPT | Performed by: INTERNAL MEDICINE

## (undated) DIAGNOSIS — R73.01 IFG (IMPAIRED FASTING GLUCOSE): ICD-10-CM

## (undated) DIAGNOSIS — E78.00 PURE HYPERCHOLESTEROLEMIA: Primary | ICD-10-CM

## (undated) DEVICE — CATH GOLD PROBE HEMOGLIDE 7FR

## (undated) DEVICE — MEDI-VAC NON-CONDUCTIVE SUCTION TUBING: Brand: CARDINAL HEALTH

## (undated) DEVICE — FORCEP BIOPSY RJ4 LG CAP W/ND

## (undated) DEVICE — Device: Brand: DEFENDO AIR/WATER/SUCTION AND BIOPSY VALVE

## (undated) DEVICE — CLIP RESOLUTION 235CM

## (undated) DEVICE — ENDOSCOPY PACK UPPER: Brand: MEDLINE INDUSTRIES, INC.

## (undated) DEVICE — FILTERLINE NASAL ADULT O2/CO2

## (undated) DEVICE — 3M™ RED DOT™ MONITORING ELECTRODE WITH FOAM TAPE AND STICKY GEL, 50/BAG, 20/CASE, 72/PLT 2570: Brand: RED DOT™

## (undated) DEVICE — NEEDLE CONTRAST INTERJECT 25G

## (undated) DEVICE — MEDI-VAC SUCTION HANDLE REGULAR CAPACITY: Brand: CARDINAL HEALTH

## (undated) DEVICE — 1200CC GUARDIAN II: Brand: GUARDIAN

## (undated) NOTE — Clinical Note
Paco Bennett- looks like this patient was supposed to have CT sim after ADT- got ADT last week but does not have any pending appts with you or RT team. Can your team reach out to him to get treatment going? Thanks Wally

## (undated) NOTE — LETTER
[facility]  Briana Powell 62 [address]    Consent for Operation    Date: __________________    Time: _______________    1.  I authorize the performance upon Tammy Houser the following operation:    Procedure(s):  Esophagogastroduode revealed by the pictures or by descriptive texts accompanying them. If the procedure has been videotaped, the surgeon will obtain the original videotape. The hospital will not be responsible for storage or maintenance of this tape.     6. For the purpose of THAT MY DOCTOR PROVIDED ME WITH THE ABOVE EXPLANATIONS, THAT ALL BLANKS OR STATEMENTS REQUIRING INSERTION OR COMPLETION WERE FILLED IN.     Signature of Patient:   ___________________________    When the patient is a minor or mentally incompetent to give co supplements, and pills I can buy without a prescription (including street drugs/illegal medications). Failure to inform my anesthesiologist about these medicines may increase my risk of anesthetic complications.   · If I am allergic to anything or have had Anesthesiologist Signature     Date   Time  I have discussed the procedure and information above with the patient (or patient’s representative) and answered their questions. The patient or their representative has agreed to have anesthesia services.     ___

## (undated) NOTE — MR AVS SNAPSHOT
[place]  [address] [facility] [address]  [address] [facility] [address] [phone]               Thank you for choosing us for your health care visit with Kenneth Cherry MD.  We are glad to serve you and happy to provide you with this [facility] Lipid Panel    Complete by:  As directed    Assoc Dx:  Pure hypercholesterolemia [E78.00]           Comp Metabolic Panel (14)    Complete by:  As directed    Assoc Dx:  Essential hypertension, benign [I10], Pure hypercholesterolemia [E78.00]           PSA 2 ½ hours per week – spread out over time Use a isabella to keep you motivated   Don’t forget strength training with weights and resistance Set goals and track your progress   You don’t need to join a gym. Home exercises work great.  Put more priority on exe

## (undated) NOTE — LETTER
08/11/20        Gloria Swann  [address] [address]      Dear Juan Jamil records indicate that you have outstanding lab work and or testing that was ordered for you and has not yet been completed: Fasting lab work - (at l

## (undated) NOTE — LETTER
Date: 12/2/2024        Patient Name: Mark Olivarez          To Whom it may concern:    The above patient was seen at [facility] for treatment of a medical condition.    This patient should be excused from attending work from 11/29/2024 through 12/9/2024.    The patient may return to work  on after evaluation on 12/9/2024.        Sincerely,            Mike Buchanan MD

## (undated) NOTE — LETTER
10/22/20    Patient: Davey Mohan  : 1945 Visit date: 10/22/2020    Dear  Dylan Tong MD    Thank you for referring Davey Mohan to my practice. Please find my assessment and plan below.        Assessment   Incarcerated umbilical hernia

## (undated) NOTE — Clinical Note
Pt schedule for HFU on 5/4/18 but likely will not be able to make it d/t work schedule. TE sent to triage regarding condition update and request to move appt to 5/3/18. Thank you!

## (undated) NOTE — LETTER
Date & Time: 6/20/2025, 11:34 AM  Patient: Mark Olivarez  Encounter Provider(s):    Caitlin Farrar APRN       To Whom It May Concern:    Mark Olivarez was seen and treated in our department on 6/20/2025. He can return to work 6/23/25, or earlier if feeling better.    If you have any questions or concerns, please do not hesitate to call.        _____________________________  Physician/APC Signature

## (undated) NOTE — LETTER
2020    Return to School / Work    Name: Carina Harry        : 1945    To Whom It May Concern,    Carina Harry had surgery on 2020 and is:    Able to return to school / work without restrictions on 2021. Comments:     If

## (undated) NOTE — LETTER
[facility] ULTRASOUND  [address] [facility] [address]  Authorization for Imaging Procedure  Date of Procedure:     I hereby authorize Dr. LYNCH, my physician and his/her assistants (if applicable), which may include medical students, residents, and/or fellows, to perform the following procedure and administer such anesthesia as may be determined necessary by my physician: ULTRASOUND GUIDED PROSTATE BIOPSY on Mark Olivarez.   2.  I recognize that during the procedure, unforeseen conditions may necessitate additional or different procedures than those listed above. I, therefore, further authorize and request that the above-named physician, assistants, or designees perform such procedures as are, in their judgment, necessary and desirable.    3.  My physician has discussed prior to my procedure the potential benefits, risks and side effects of this procedure; the likelihood of achieving goals; and potential problems that might occur during recuperation. They also discussed reasonable alternatives to the procedure, including risks, benefits, and side effects related to the alternatives and risks related to not receiving this procedure. I have had all my questions answered and I acknowledge that no guarantee has been made as to the result that may be obtained.    4.  Should the need arise during my procedure, which includes change of level of care prior to discharge, I also consent to the administration of blood and/or blood products. Further, I understand that despite careful testing and screening of blood or blood products by collecting agencies, I may still be subject to ill effects as a result of receiving a blood transfusion and/or blood products. The following are some, but not all, of the potential risks that can occur: fever and allergic reactions, hemolytic reactions, transmission of diseases such as Hepatitis, AIDS and Cytomegalovirus (CMV) and fluid overload. In the event that I  wish to have an autologous transfusion of my own blood, or a directed donor transfusion, I will discuss this with my physician.  Check only if Refusing Blood or Blood Products  I understand refusal of blood or blood products as deemed necessary by my physician may have serious consequences to my condition to include possible death. I hereby assume responsibility for my refusal and release the hospital, its personnel, and my physicians from any responsibility for the consequences of my refusal.   [  ] Patient Refuses Blood      5.  I authorize the use of any specimen, organs, tissues, body parts or foreign objects that may be removed from my body during the procedure for diagnosis, research or teaching purposes and their subsequent disposal by hospital authorities. I also authorize the release of specimen test results and/or written reports to my treating physician on the hospital medical staff or other referring or consulting physicians involved in my care, at the discretion of the Pathologist or my treating physician.    6.  I consent to the photographing or videotaping of the procedures to be performed, including appropriate portions of my body for medical, scientific, or educational purposes, provided my identity is not revealed by the pictures or by descriptive texts accompanying them. If the procedure has been photographed/videotaped, the physician will obtain the original picture, image, videotape or CD. The hospital will not be responsible for storage, release or maintenance of the picture, image, tape or CD.   7.  I consent to the presence of a  or observers in the operating room as deemed necessary by my physician or their designees.    8.  I recognize that in the event my procedure results in extended X-Ray/fluoroscopy time, I may develop a skin reaction.    9.  If I have a Do Not Attempt Resuscitation (DNAR) order in place, that status will be suspended while in the operating room,  procedural suite, and during the recovery period unless otherwise explicitly stated by me (or a person authorized to consent on my behalf). The performing physician or my attending physician will determine when the applicable recovery period ends for purposes of reinstating the DNAR order.  10.  I acknowledge that my physician has explained sedation/analgesia administration to me including the risk and benefits I consent to the administration of sedation/analgesia as may be necessary or desirable in the judgment of my physician.      I CERTIFY THAT I HAVE READ AND FULLY UNDERSTAND THE ABOVE CONSENT FOR THE PROCEDURE.   Signature of Patient: _____________________________________________________________  Responsible person in case of minor, unconscious: ____________________________________  Relationship to patient:  __________________________________________________________  Signature of Witness: _______________________________Date: _________Time: __________    Statement of Physician: My signature below affirms that prior to the time of the procedure, I have explained to the patient and/or his guardian, the risks and benefits involved in the proposed treatment and any reasonable alternative to the proposed treatment. I have also explained the risks and benefits involved in the refusal of the proposed treatment and have answered the patient's questions. If I have a significant financial interest in a co-management agreement or a significant financial interest in any product or implant, or other significant relationship used in the procedure/surgery, I have disclosed this and had a discussion with my patient.  Signature of Physician:   _________________________________Date:_____________Time:________    Patient Name: Mark Olivarez : 1945  Printed: 2024   Medical Record #: [id number]

## (undated) NOTE — LETTER
04/28/21        [facility]  [address] [address]      Dear Joaquín Simpson,    Our records indicate that you have outstanding lab work and or testing that was ordered for you and has not yet been completed:  Orders Placed This [place]

## (undated) NOTE — LETTER
01/19/19        Stef Smithdle  [address] [address]      Dear Sanford Crooks,    Our records indicate that you have outstanding lab work and or testing that was ordered for you and has not yet been completed:  Orders Placed This [place]

## (undated) NOTE — LETTER
Date & Time: 1/28/2021, 12:05 PM  Patient: Shannon Enriquez  Encounter Provider(s):    DONNA Ashton       To Whom It May Concern:    Shannon Enriquez was seen and treated in our department on 1/28/2021. He can return to work.     If you have any

## (undated) NOTE — LETTER
Date: 6/15/2022    Patient Name: Anselmo Adrian          To Whom it may concern: This letter has been written at the patient's request. The above patient was seen at the [facility] for treatment of a medical condition. This patient should be excused from attending work from 6/15/21 through 6/18/22.           Sincerely,      Hipolito Tong MD

## (undated) NOTE — LETTER
20    Patient: Hedy Fine  : 1945 Visit date: 2020    Dear  Fifi Nogueira MD    Thank you for referring Hedy Fine to my practice. Please find my assessment and plan below.       Assessment   Incarcerated umbilical hernia